# Patient Record
Sex: MALE | Race: WHITE | Employment: OTHER | URBAN - METROPOLITAN AREA
[De-identification: names, ages, dates, MRNs, and addresses within clinical notes are randomized per-mention and may not be internally consistent; named-entity substitution may affect disease eponyms.]

---

## 2017-01-03 RX ORDER — GABAPENTIN 300 MG/1
CAPSULE ORAL
Qty: 270 CAPSULE | Refills: 3 | Status: SHIPPED | OUTPATIENT
Start: 2017-01-03 | End: 2019-05-17

## 2017-03-06 RX ORDER — TAMSULOSIN HYDROCHLORIDE 0.4 MG/1
CAPSULE ORAL
Qty: 90 CAPSULE | Refills: 0 | Status: SHIPPED | OUTPATIENT
Start: 2017-03-06 | End: 2019-05-06 | Stop reason: SDUPTHER

## 2017-03-06 RX ORDER — PRAVASTATIN SODIUM 40 MG
TABLET ORAL
Qty: 90 TABLET | Refills: 0 | Status: SHIPPED | OUTPATIENT
Start: 2017-03-06 | End: 2019-05-06

## 2017-03-06 RX ORDER — FLUOXETINE HYDROCHLORIDE 40 MG/1
CAPSULE ORAL
Qty: 90 CAPSULE | Refills: 0 | Status: SHIPPED | OUTPATIENT
Start: 2017-03-06 | End: 2020-03-10 | Stop reason: SDUPTHER

## 2017-03-18 RX ORDER — SULINDAC 200 MG/1
TABLET ORAL
Qty: 60 TABLET | Refills: 4 | Status: SHIPPED | OUTPATIENT
Start: 2017-03-18 | End: 2019-05-06 | Stop reason: SDUPTHER

## 2017-03-20 RX ORDER — PRAVASTATIN SODIUM 40 MG
TABLET ORAL
Qty: 90 TABLET | Refills: 0 | OUTPATIENT
Start: 2017-03-20

## 2017-04-03 RX ORDER — CYCLOBENZAPRINE HCL 10 MG
TABLET ORAL
Qty: 120 TABLET | Refills: 1 | Status: SHIPPED | OUTPATIENT
Start: 2017-04-03 | End: 2021-12-02 | Stop reason: ALTCHOICE

## 2017-05-01 RX ORDER — ATENOLOL 100 MG/1
TABLET ORAL
Qty: 90 TABLET | Refills: 0 | Status: SHIPPED | OUTPATIENT
Start: 2017-05-01 | End: 2020-09-10 | Stop reason: SDUPTHER

## 2017-05-06 RX ORDER — TERAZOSIN 5 MG/1
CAPSULE ORAL
Qty: 90 CAPSULE | Refills: 0 | Status: SHIPPED | OUTPATIENT
Start: 2017-05-06 | End: 2019-05-06 | Stop reason: ALTCHOICE

## 2017-08-23 RX ORDER — SULINDAC 200 MG/1
TABLET ORAL
Qty: 60 TABLET | Refills: 1 | OUTPATIENT
Start: 2017-08-23

## 2017-08-25 RX ORDER — SULINDAC 200 MG/1
TABLET ORAL
Qty: 60 TABLET | Refills: 1 | OUTPATIENT
Start: 2017-08-25

## 2017-10-17 RX ORDER — CYCLOBENZAPRINE HCL 10 MG
TABLET ORAL
Qty: 120 TABLET | Refills: 0 | OUTPATIENT
Start: 2017-10-17

## 2018-01-03 RX ORDER — LISINOPRIL AND HYDROCHLOROTHIAZIDE 25; 20 MG/1; MG/1
TABLET ORAL
Qty: 90 TABLET | Refills: 0 | OUTPATIENT
Start: 2018-01-03

## 2018-01-04 RX ORDER — LISINOPRIL AND HYDROCHLOROTHIAZIDE 25; 20 MG/1; MG/1
TABLET ORAL
Qty: 90 TABLET | Refills: 0 | OUTPATIENT
Start: 2018-01-04

## 2019-05-06 ENCOUNTER — OFFICE VISIT (OUTPATIENT)
Dept: FAMILY MEDICINE CLINIC | Age: 64
End: 2019-05-06
Payer: MEDICARE

## 2019-05-06 VITALS
TEMPERATURE: 97.8 F | SYSTOLIC BLOOD PRESSURE: 136 MMHG | OXYGEN SATURATION: 96 % | BODY MASS INDEX: 45.1 KG/M2 | HEIGHT: 70 IN | WEIGHT: 315 LBS | HEART RATE: 52 BPM | DIASTOLIC BLOOD PRESSURE: 84 MMHG

## 2019-05-06 DIAGNOSIS — J45.20 MILD INTERMITTENT ASTHMA WITHOUT COMPLICATION: ICD-10-CM

## 2019-05-06 DIAGNOSIS — I10 ESSENTIAL HYPERTENSION: Primary | ICD-10-CM

## 2019-05-06 DIAGNOSIS — G47.33 OSA TREATED WITH BIPAP: ICD-10-CM

## 2019-05-06 DIAGNOSIS — R35.0 BENIGN PROSTATIC HYPERPLASIA WITH URINARY FREQUENCY: ICD-10-CM

## 2019-05-06 DIAGNOSIS — M19.90 ARTHRITIS: ICD-10-CM

## 2019-05-06 DIAGNOSIS — Z91.81 AT HIGH RISK FOR FALLS: ICD-10-CM

## 2019-05-06 DIAGNOSIS — R35.0 FREQUENT URINATION: ICD-10-CM

## 2019-05-06 DIAGNOSIS — E78.00 HYPERCHOLESTEREMIA: ICD-10-CM

## 2019-05-06 DIAGNOSIS — N40.1 BENIGN PROSTATIC HYPERPLASIA WITH URINARY FREQUENCY: ICD-10-CM

## 2019-05-06 LAB
A/G RATIO: 1.4 (ref 1.1–2.2)
ALBUMIN SERPL-MCNC: 4.3 G/DL (ref 3.4–5)
ALP BLD-CCNC: 75 U/L (ref 40–129)
ALT SERPL-CCNC: 18 U/L (ref 10–40)
ANION GAP SERPL CALCULATED.3IONS-SCNC: 13 MMOL/L (ref 3–16)
AST SERPL-CCNC: 16 U/L (ref 15–37)
BASOPHILS ABSOLUTE: 0.1 K/UL (ref 0–0.2)
BASOPHILS RELATIVE PERCENT: 0.8 %
BILIRUB SERPL-MCNC: 0.4 MG/DL (ref 0–1)
BUN BLDV-MCNC: 19 MG/DL (ref 7–20)
CALCIUM SERPL-MCNC: 9.8 MG/DL (ref 8.3–10.6)
CHLORIDE BLD-SCNC: 104 MMOL/L (ref 99–110)
CHOLESTEROL, TOTAL: 193 MG/DL (ref 0–199)
CO2: 25 MMOL/L (ref 21–32)
CREAT SERPL-MCNC: 1.3 MG/DL (ref 0.8–1.3)
EOSINOPHILS ABSOLUTE: 0.3 K/UL (ref 0–0.6)
EOSINOPHILS RELATIVE PERCENT: 4.1 %
GFR AFRICAN AMERICAN: >60
GFR NON-AFRICAN AMERICAN: 55
GLOBULIN: 3 G/DL
GLUCOSE BLD-MCNC: 104 MG/DL (ref 70–99)
HCT VFR BLD CALC: 41.7 % (ref 40.5–52.5)
HDLC SERPL-MCNC: 56 MG/DL (ref 40–60)
HEMOGLOBIN: 14 G/DL (ref 13.5–17.5)
LDL CHOLESTEROL CALCULATED: 116 MG/DL
LYMPHOCYTES ABSOLUTE: 1.4 K/UL (ref 1–5.1)
LYMPHOCYTES RELATIVE PERCENT: 21.9 %
MCH RBC QN AUTO: 29.4 PG (ref 26–34)
MCHC RBC AUTO-ENTMCNC: 33.5 G/DL (ref 31–36)
MCV RBC AUTO: 87.8 FL (ref 80–100)
MONOCYTES ABSOLUTE: 0.6 K/UL (ref 0–1.3)
MONOCYTES RELATIVE PERCENT: 9.4 %
NEUTROPHILS ABSOLUTE: 4.1 K/UL (ref 1.7–7.7)
NEUTROPHILS RELATIVE PERCENT: 63.8 %
PDW BLD-RTO: 14.8 % (ref 12.4–15.4)
PLATELET # BLD: 192 K/UL (ref 135–450)
PMV BLD AUTO: 9.7 FL (ref 5–10.5)
POTASSIUM SERPL-SCNC: 4.2 MMOL/L (ref 3.5–5.1)
RBC # BLD: 4.75 M/UL (ref 4.2–5.9)
SODIUM BLD-SCNC: 142 MMOL/L (ref 136–145)
TOTAL PROTEIN: 7.3 G/DL (ref 6.4–8.2)
TRIGL SERPL-MCNC: 106 MG/DL (ref 0–150)
VLDLC SERPL CALC-MCNC: 21 MG/DL
WBC # BLD: 6.4 K/UL (ref 4–11)

## 2019-05-06 PROCEDURE — G8427 DOCREV CUR MEDS BY ELIG CLIN: HCPCS | Performed by: NURSE PRACTITIONER

## 2019-05-06 PROCEDURE — 3017F COLORECTAL CA SCREEN DOC REV: CPT | Performed by: NURSE PRACTITIONER

## 2019-05-06 PROCEDURE — G8417 CALC BMI ABV UP PARAM F/U: HCPCS | Performed by: NURSE PRACTITIONER

## 2019-05-06 PROCEDURE — 4040F PNEUMOC VAC/ADMIN/RCVD: CPT | Performed by: NURSE PRACTITIONER

## 2019-05-06 PROCEDURE — 36415 COLL VENOUS BLD VENIPUNCTURE: CPT | Performed by: NURSE PRACTITIONER

## 2019-05-06 PROCEDURE — 99215 OFFICE O/P EST HI 40 MIN: CPT | Performed by: NURSE PRACTITIONER

## 2019-05-06 PROCEDURE — 1036F TOBACCO NON-USER: CPT | Performed by: NURSE PRACTITIONER

## 2019-05-06 PROCEDURE — 1123F ACP DISCUSS/DSCN MKR DOCD: CPT | Performed by: NURSE PRACTITIONER

## 2019-05-06 RX ORDER — TAMSULOSIN HYDROCHLORIDE 0.4 MG/1
CAPSULE ORAL
Qty: 90 CAPSULE | Refills: 0 | Status: SHIPPED | OUTPATIENT
Start: 2019-05-06 | End: 2021-01-22 | Stop reason: SDUPTHER

## 2019-05-06 RX ORDER — SULINDAC 200 MG/1
200 TABLET ORAL 2 TIMES DAILY
Qty: 180 TABLET | Refills: 0 | Status: CANCELLED | OUTPATIENT
Start: 2019-05-06

## 2019-05-06 RX ORDER — SULINDAC 200 MG/1
TABLET ORAL
Qty: 60 TABLET | Refills: 4 | Status: SHIPPED | OUTPATIENT
Start: 2019-05-06 | End: 2022-04-01 | Stop reason: SDUPTHER

## 2019-05-06 ASSESSMENT — ENCOUNTER SYMPTOMS
PHOTOPHOBIA: 0
COUGH: 0
NAUSEA: 0
ABDOMINAL PAIN: 0
RHINORRHEA: 0
TROUBLE SWALLOWING: 0
WHEEZING: 0
DIARRHEA: 0
SORE THROAT: 0

## 2019-05-06 ASSESSMENT — PATIENT HEALTH QUESTIONNAIRE - PHQ9
SUM OF ALL RESPONSES TO PHQ9 QUESTIONS 1 & 2: 0
SUM OF ALL RESPONSES TO PHQ QUESTIONS 1-9: 0
2. FEELING DOWN, DEPRESSED OR HOPELESS: 0
SUM OF ALL RESPONSES TO PHQ QUESTIONS 1-9: 0
1. LITTLE INTEREST OR PLEASURE IN DOING THINGS: 0

## 2019-05-06 NOTE — PATIENT INSTRUCTIONS
Please read the healthy family handout that you were given and share it with your family. Please compare this printed medication list with your medications at home to be sure they are the same. If you have any medications that are different please contact us immediately at [phone]. Also review your allergies that we have listed, these may also include medications that you have not been able to tolerate, make sure everything listed is correct. If you have any allergies that are different please contact us immediately at [phone].

## 2019-05-06 NOTE — PROGRESS NOTES
Patient: Sonal Montilla is a 75 y.o. male who presents today with the following Chief Complaint(s):  Chief Complaint   Patient presents with   [address]:  1. Essential hypertension  Controlled  Continue current medication as taking at home. If dizziness returns or bradycardia worsens discontinue metoprolol  Follow-up in 4 months  - CBC Auto Differential  - Comprehensive Metabolic Panel    2. Hypercholesteremia  Pending labs  Continue current medication  Follow-up and 12 months  - Lipid Panel    3. Arthritis  Uncontrolled  Take Neurontin as directed. Discussed Clinoril should not be taken after the age of 72. Clinoril decrease his kidney functions and may cause blood pressure to go higher. Also has cardiovascular risk after age 72. Verbal understands. Use sparingly  Follow-up in 4 months  - sulindac (CLINORIL) 200 MG tablet; TAKE ONE TABLET BY MOUTH TWICE A DAY  Dispense: 60 tablet; Refill: 4    4. Benign prostatic hyperplasia with urinary frequency  Symptomatic  Pending PSA  Continue current medication  Declines further evaluation    5. NICOLE treated with BiPAP  Continue current treatment    6. Frequent urination  Likely related to BPH  - tamsulosin (FLOMAX) 0.4 MG capsule; TAKE ONE CAPSULE BY MOUTH DAILY  Dispense: 90 capsule; Refill: 0  - PSA, Total and Free    7. Mild intermittent asthma without complication  Uncontrolled  Refuses inhalers or other evaluation/treatment    8. At high risk for falls  Symptoms have pretty improved significantly since decreasing atenolol 50 mg daily. Declines further evaluation  Follow-up in 1 year sooner as needed. MARY Quijano is in the office as a returning patient. He was living in [place] with his wife and recently moved back to [place] to be home with his ill stepdaughter. He was seen a primary care provider in [place]. Last visit was 2 months ago. At that time he was having problems with stumbling and dizziness.  Terazosin was discontinued and metoprolol dose was cut in half. He reports his symptoms have improved significantly. He reports that he has hammertoes on both feet and severe arthritis in his left ankle which also makes him stumble. .  Reports he has not actually fallen. Reports his blood pressure has been stable. Denies chest pain, palpitations, orthopnea, abnormal fatigue or headache. He does have some shortness of breath with exertion but relates that to being obese. He has high cholesterol which she takes medication for. He is noncompliant with diet and exercise. He has tried a diet in the past but does not stick with it. Does not exercise because of multiple joint arthritis    BPH is treated with Flomax. He reports he still urinates quite frequently. He has urinary urgency and occasional functional incontinence. Denies hematuria    He has history of asthma. He sometimes has problems getting his breath. Does not use an inhaler. He does not feel that he needs an inhaler. He does have sleep apnea and uses a machine every night. Current Outpatient Medications   Medication Sig Dispense Refill    atenolol (TENORMIN) 100 MG tablet TAKE ONE TABLET BY MOUTH DAILY (Patient taking differently: Taking 1/2 tablet daily) 90 tablet 0    sulindac (CLINORIL) 200 MG tablet TAKE ONE TABLET BY MOUTH TWICE A DAY 60 tablet 4    tamsulosin (FLOMAX) 0.4 MG capsule TAKE ONE CAPSULE BY MOUTH DAILY 90 capsule 0    FLUoxetine (PROZAC) 40 MG capsule TAKE ONE CAPSULE BY MOUTH DAILY 90 capsule 0    gabapentin (NEURONTIN) 300 MG capsule TAKE ONE CAPSULE BY MOUTH THREE TIMES A  capsule 3    lisinopril-hydrochlorothiazide (PRINZIDE;ZESTORETIC) 20-25 MG per tablet Take 1 tablet by mouth daily 90 tablet 3    Multiple Vitamin (THERA) TABS Take 1 tablet by mouth daily.  SAW PALMETTO Take  by mouth daily.       cyclobenzaprine (FLEXERIL) 10 MG tablet TAKE ONE TABLET BY MOUTH FOUR TIMES A DAY AS NEEDED FOR MUSCLE SPASMS 120 tablet 1  Cholecalciferol (VITAMIN D) 2000 UNITS CAPS capsule 1 tablet daily 30 capsule      No current facility-administered medications for this visit. Patient's past medical history, surgical history, family history, medications,  and allergies  were all reviewed and updated as appropriate today. Review of Systems   Constitutional: Negative for appetite change, fatigue and unexpected weight change. HENT: Negative for congestion, ear pain, rhinorrhea, sore throat and trouble swallowing. Eyes: Negative for photophobia and visual disturbance. Respiratory: Negative for cough and wheezing. Cardiovascular: Negative for chest pain and palpitations. Gastrointestinal: Negative for abdominal pain, diarrhea and nausea. Endocrine: Positive for polyphagia and polyuria. Negative for polydipsia. Skin: Negative for rash. Allergic/Immunologic: Positive for environmental allergies. Negative for immunocompromised state. Neurological: Negative for numbness and headaches. Hematological: Does not bruise/bleed easily. Psychiatric/Behavioral: The patient is not nervous/anxious. See HPI    Physical Exam   Constitutional: He is oriented to person, place, and time. He appears well-developed and well-nourished. HENT:   Head: Normocephalic and atraumatic. Mouth/Throat: Oropharynx is clear and moist.   Eyes: Pupils are equal, round, and reactive to light. Conjunctivae and EOM are normal.   Neck: Normal range of motion. Neck supple. Carotid bruit is not present. Normal range of motion present. Cardiovascular: Regular rhythm, normal heart sounds and intact distal pulses. Bradycardia present. No murmur heard. Pulmonary/Chest: Effort normal and breath sounds normal. No respiratory distress. Musculoskeletal: He exhibits no edema. Neurological: He is alert and oriented to person, place, and time. Skin: Skin is warm and dry. Capillary refill takes 2 to 3 seconds.    Psychiatric: He has a normal mood and affect. Judgment and thought content normal.   Vitals reviewed. Vitals:    05/06/19 0845   BP: 136/84   Pulse: 52   Temp: 97.8 °F (36.6 °C)   TempSrc: Oral   SpO2: 96%   Weight: (!) 317 lb 2 oz (143.8 kg)   Height: 5' 10\" (1.778 m)           JASEN Rosado-CNP    The note was completedusing Dragon voice recognition transcription. Every effort was made to ensure accuracy; however, inadvertent  transcription errors may be present despite my best efforts to edit errors. On the basis of positive falls risk screening, assessment and plan is as follows: home safety tips provided, patient will follow up in 1 year(s) for further evaluation, patient declines any further evaluation/treatment for increased falls risk.

## 2019-05-08 LAB
PROSTATE SPECIFIC ANTIGEN FREE: 0.6 UG/L
PROSTATE SPECIFIC ANTIGEN PERCENT FREE: 40 %
PROSTATE SPECIFIC ANTIGEN: 1.5 UG/L (ref 0–4)

## 2019-05-08 RX ORDER — ATORVASTATIN CALCIUM 20 MG/1
20 TABLET, FILM COATED ORAL DAILY
Qty: 30 TABLET | Refills: 3 | Status: SHIPPED | OUTPATIENT
Start: 2019-05-08 | End: 2022-02-24 | Stop reason: SDUPTHER

## 2019-05-17 ENCOUNTER — OFFICE VISIT (OUTPATIENT)
Dept: FAMILY MEDICINE CLINIC | Age: 64
End: 2019-05-17
Payer: MEDICARE

## 2019-05-17 VITALS
RESPIRATION RATE: 16 BRPM | OXYGEN SATURATION: 94 % | HEART RATE: 61 BPM | DIASTOLIC BLOOD PRESSURE: 85 MMHG | SYSTOLIC BLOOD PRESSURE: 129 MMHG | HEIGHT: 70 IN | BODY MASS INDEX: 44.95 KG/M2 | WEIGHT: 314 LBS

## 2019-05-17 DIAGNOSIS — L03.115 CELLULITIS OF RIGHT LOWER LEG: Primary | ICD-10-CM

## 2019-05-17 DIAGNOSIS — S80.11XA CONTUSION OF RIGHT LOWER LEG, INITIAL ENCOUNTER: ICD-10-CM

## 2019-05-17 DIAGNOSIS — G57.93 NEUROPATHY INVOLVING BOTH LOWER EXTREMITIES: ICD-10-CM

## 2019-05-17 PROCEDURE — 4040F PNEUMOC VAC/ADMIN/RCVD: CPT | Performed by: NURSE PRACTITIONER

## 2019-05-17 PROCEDURE — 3017F COLORECTAL CA SCREEN DOC REV: CPT | Performed by: NURSE PRACTITIONER

## 2019-05-17 PROCEDURE — 1123F ACP DISCUSS/DSCN MKR DOCD: CPT | Performed by: NURSE PRACTITIONER

## 2019-05-17 PROCEDURE — 1036F TOBACCO NON-USER: CPT | Performed by: NURSE PRACTITIONER

## 2019-05-17 PROCEDURE — 99214 OFFICE O/P EST MOD 30 MIN: CPT | Performed by: NURSE PRACTITIONER

## 2019-05-17 PROCEDURE — G8427 DOCREV CUR MEDS BY ELIG CLIN: HCPCS | Performed by: NURSE PRACTITIONER

## 2019-05-17 PROCEDURE — G8417 CALC BMI ABV UP PARAM F/U: HCPCS | Performed by: NURSE PRACTITIONER

## 2019-05-17 RX ORDER — CEPHALEXIN 500 MG/1
500 CAPSULE ORAL 4 TIMES DAILY
Qty: 28 CAPSULE | Refills: 0 | Status: SHIPPED | OUTPATIENT
Start: 2019-05-17 | End: 2019-05-24

## 2019-05-17 RX ORDER — GABAPENTIN 400 MG/1
400 CAPSULE ORAL 3 TIMES DAILY
Qty: 270 CAPSULE | Refills: 0 | Status: SHIPPED | OUTPATIENT
Start: 2019-05-17 | End: 2019-08-12 | Stop reason: SDUPTHER

## 2019-05-17 NOTE — PROGRESS NOTES
Patient: Miguel Le is a 75 y.o. male who presents today with the following Chief Complaint(s):  Chief Complaint   Patient presents with    Fall     onset x 1 week ago, pt hit right lower leg on a \"crossbeam\", spouse applied her leftover eczema cream, no relief . Pt states he has had past cellulitis. Helps to elevate     Leg Injury     right lower leg, red, warm to touch, swollen, knot formed, tenderness is constant and rated 5/10 on scale. Assessment & Plan:  1. Cellulitis of right lower leg  New problem  Warm compresses, Ace wrap's and elevation  Avoid fresh water sources such as [place], ponds and [place] antibiotic  Follow-up if not improved in one week  - cephALEXin (KEFLEX) 500 MG capsule; Take 1 capsule by mouth 4 times daily for 7 days  Dispense: 28 capsule; Refill: 0    2. Contusion of right lower leg, initial encounter  Recommended imaging. Patient declines at this time. He may call back if symptoms are not improved in one week for imaging order. No open wounds  Gentle massage to hematoma  Follow-up if not improved in one week    3. Neuropathy involving both lower extremities  Uncontrolled  Increase gabapentin  - gabapentin (NEURONTIN) 400 MG capsule; Take 1 capsule by mouth 3 times daily for 90 days. Dispense: 270 capsule; Refill: 0      HPI  Bill's in the office with complaints of right lower leg injury. One week ago he was helping someone move a dock and he fell hitting his leg on board on the dock. No other injuries. Immediately the leg got red and sore. He has a hematoma to the shin. Redness and pain has increased over the past week. Redness has started to spread to his ankle. He has some swelling and warmth. Wife has been using psoriasis cream without relief. Symptoms are better with elevation. It is painful to walk and touch. Denies open wound, drainage, fever, nausea or worsening of numbness. Site noted patient has bilateral lower leg neuropathy.   He feels oriented to person, place, and time. Skin: Skin is warm and dry. Psychiatric: He has a normal mood and affect. His behavior is normal. Thought content normal.   Nursing note and vitals reviewed. Vitals:    05/17/19 1015   BP: 129/85   Site: Left Upper Arm   Position: Sitting   Cuff Size: Large Adult   Pulse: 61   Resp: 16   SpO2: 94%   Weight: (!) 314 lb (142.4 kg)   Height: 5' 10\" (1.778 m)           Lissette Butler, APRN-CNP    The note was completedusing Dragon voice recognition transcription. Every effort was made to ensure accuracy; however, inadvertent  transcription errors may be present despite my best efforts to edit errors.

## 2019-05-17 NOTE — PATIENT INSTRUCTIONS
Patient Education        Cellulitis: Care Instructions  Your Care Instructions    Cellulitis is a skin infection caused by bacteria, most often strep or staph. It often occurs after a break in the skin from a scrape, cut, bite, or puncture, or after a rash. Cellulitis may be treated without doing tests to find out what caused it. But your doctor may do tests, if needed, to look for a specific bacteria, like methicillin-resistant Staphylococcus aureus (MRSA). The doctor has checked you carefully, but problems can develop later. If you notice any problems or new symptoms, get medical treatment right away. Follow-up care is a key part of your treatment and safety. Be sure to make and go to all appointments, and call your doctor if you are having problems. It's also a good idea to know your test results and keep a list of the medicines you take. How can you care for yourself at home? · Take your antibiotics as directed. Do not stop taking them just because you feel better. You need to take the full course of antibiotics. · Prop up the infected area on pillows to reduce pain and swelling. Try to keep the area above the level of your heart as often as you can. · If your doctor told you how to care for your wound, follow your doctor's instructions. If you did not get instructions, follow this general advice:  ? Wash the wound with clean water 2 times a day. Don't use hydrogen peroxide or alcohol, which can slow healing. ? You may cover the wound with a thin layer of petroleum jelly, such as Vaseline, and a nonstick bandage. ? Apply more petroleum jelly and replace the bandage as needed. · Be safe with medicines. Take pain medicines exactly as directed. ? If the doctor gave you a prescription medicine for pain, take it as prescribed. ? If you are not taking a prescription pain medicine, ask your doctor if you can take an over-the-counter medicine.   To prevent cellulitis in the future  · Try to prevent cuts, scrapes, or other injuries to your skin. Cellulitis most often occurs where there is a break in the skin. · If you get a scrape, cut, mild burn, or bite, wash the wound with clean water as soon as you can to help avoid infection. Don't use hydrogen peroxide or alcohol, which can slow healing. · If you have swelling in your legs (edema), support stockings and good skin care may help prevent leg sores and cellulitis. · Take care of your feet, especially if you have diabetes or other conditions that increase the risk of infection. Wear shoes and socks. Do not go barefoot. If you have athlete's foot or other skin problems on your feet, talk to your doctor about how to treat them. When should you call for help? Call your doctor now or seek immediate medical care if:    · You have signs that your infection is getting worse, such as:  ? Increased pain, swelling, warmth, or redness. ? Red streaks leading from the area. ? Pus draining from the area. ? A fever.     · You get a rash.    Watch closely for changes in your health, and be sure to contact your doctor if:    · You do not get better as expected. Where can you learn more? Go to https://Sunglass.Oculus360. org and sign in to your Publer account. Enter G053 in the [facility] box to learn more about \"Cellulitis: Care Instructions. \"     If you do not have an account, please click on the \"Sign Up Now\" link. Current as of: April 17, 2018  Content Version: 12.0  © 9435-4823 Healthwise, Incorporated. Care instructions adapted under license by [facility] ([facility]). If you have questions about a medical condition or this instruction, always ask your healthcare professional. [place] [address] any warranty or liability for your use of this information. Patient Education        Contusion: Care Instructions  Your Care Instructions    Contusion is the medical term for a bruise.  It is the result of a direct blow or an impact, such   · The area near the contusion is cold or pale.    Watch closely for changes in your health, and be sure to contact your doctor if:    · You do not get better as expected. Where can you learn more? Go to https://[employer] Technologypecathyeweb.[employer]. org and sign in to your [employer] account. Enter V188 in the indoo.rs box to learn more about \"Contusion: Care Instructions. \"     If you do not have an account, please click on the \"Sign Up Now\" link. Current as of: September 23, 2018  Content Version: 12.0  © 7476-4276 Healthwise, Incorporated. Care instructions adapted under license by [facility] ([facility]). If you have questions about a medical condition or this instruction, always ask your healthcare professional. [address] any warranty or liability for your use of this information.

## 2020-03-10 ENCOUNTER — OFFICE VISIT (OUTPATIENT)
Dept: FAMILY MEDICINE CLINIC | Age: 65
End: 2020-03-10
Payer: MEDICARE

## 2020-03-10 VITALS
OXYGEN SATURATION: 96 % | TEMPERATURE: 97.9 F | BODY MASS INDEX: 46.67 KG/M2 | DIASTOLIC BLOOD PRESSURE: 92 MMHG | SYSTOLIC BLOOD PRESSURE: 148 MMHG | HEART RATE: 54 BPM | WEIGHT: 315 LBS

## 2020-03-10 LAB
ANION GAP SERPL CALCULATED.3IONS-SCNC: 14 MMOL/L (ref 3–16)
BUN BLDV-MCNC: 32 MG/DL (ref 7–20)
CALCIUM SERPL-MCNC: 9.7 MG/DL (ref 8.3–10.6)
CHLORIDE BLD-SCNC: 110 MMOL/L (ref 99–110)
CHOLESTEROL, FASTING: 107 MG/DL (ref 0–199)
CO2: 22 MMOL/L (ref 21–32)
CREAT SERPL-MCNC: 1 MG/DL (ref 0.8–1.3)
GFR AFRICAN AMERICAN: >60
GFR NON-AFRICAN AMERICAN: >60
GLUCOSE BLD-MCNC: 89 MG/DL (ref 70–99)
HDLC SERPL-MCNC: 45 MG/DL (ref 40–60)
LDL CHOLESTEROL CALCULATED: 47 MG/DL
POTASSIUM SERPL-SCNC: 4.6 MMOL/L (ref 3.5–5.1)
SODIUM BLD-SCNC: 146 MMOL/L (ref 136–145)
TRIGLYCERIDE, FASTING: 75 MG/DL (ref 0–150)
VLDLC SERPL CALC-MCNC: 15 MG/DL

## 2020-03-10 PROCEDURE — 99214 OFFICE O/P EST MOD 30 MIN: CPT | Performed by: NURSE PRACTITIONER

## 2020-03-10 PROCEDURE — 90732 PPSV23 VACC 2 YRS+ SUBQ/IM: CPT | Performed by: NURSE PRACTITIONER

## 2020-03-10 PROCEDURE — 36415 COLL VENOUS BLD VENIPUNCTURE: CPT | Performed by: NURSE PRACTITIONER

## 2020-03-10 PROCEDURE — G0009 ADMIN PNEUMOCOCCAL VACCINE: HCPCS | Performed by: NURSE PRACTITIONER

## 2020-03-10 RX ORDER — TOPIRAMATE 50 MG/1
TABLET, FILM COATED ORAL
COMMUNITY
Start: 2020-01-20 | End: 2020-03-10 | Stop reason: SINTOL

## 2020-03-10 RX ORDER — FLUOXETINE HYDROCHLORIDE 40 MG/1
40 CAPSULE ORAL DAILY
Qty: 90 CAPSULE | Refills: 0 | Status: SHIPPED | OUTPATIENT
Start: 2020-03-10 | End: 2020-06-10 | Stop reason: SDUPTHER

## 2020-03-10 RX ORDER — GABAPENTIN 300 MG/1
600 CAPSULE ORAL 3 TIMES DAILY
Qty: 180 CAPSULE | Refills: 1 | Status: SHIPPED | OUTPATIENT
Start: 2020-03-10 | End: 2020-05-12 | Stop reason: SDUPTHER

## 2020-03-10 RX ORDER — ZOSTER VACCINE RECOMBINANT, ADJUVANTED 50 MCG/0.5
0.5 KIT INTRAMUSCULAR SEE ADMIN INSTRUCTIONS
Qty: 0.5 ML | Refills: 0 | Status: SHIPPED | OUTPATIENT
Start: 2020-03-10 | End: 2020-09-06

## 2020-03-10 ASSESSMENT — ENCOUNTER SYMPTOMS
ABDOMINAL PAIN: 0
RHINORRHEA: 0
WHEEZING: 0
NAUSEA: 0
CHEST TIGHTNESS: 0
BACK PAIN: 1
SHORTNESS OF BREATH: 0
ABDOMINAL DISTENTION: 0
SORE THROAT: 0
DIARRHEA: 0
COUGH: 0
VOMITING: 0

## 2020-03-10 ASSESSMENT — PATIENT HEALTH QUESTIONNAIRE - PHQ9
SUM OF ALL RESPONSES TO PHQ QUESTIONS 1-9: 0
SUM OF ALL RESPONSES TO PHQ QUESTIONS 1-9: 0
SUM OF ALL RESPONSES TO PHQ9 QUESTIONS 1 & 2: 0
2. FEELING DOWN, DEPRESSED OR HOPELESS: 0
1. LITTLE INTEREST OR PLEASURE IN DOING THINGS: 0

## 2020-03-10 NOTE — PATIENT INSTRUCTIONS
Please read the healthy family handout that you were given and share it with your family. Please compare this printed medication list with your medications at home to be sure they are the same. If you have any medications that are different please contact us immediately at [phone]. Also review your allergies that we have listed, these may also include medications that you have not been able to tolerate, make sure everything listed is correct. If you have any allergies that are different please contact us immediately at [phone].

## 2020-03-10 NOTE — PROGRESS NOTES
CHOLECYSTECTOMY      COLONOSCOPY  11    KNEE SURGERY Right 2016    Rt total knee    OTHER SURGICAL HISTORY Right 2016    RIGHT TOTAL SHOULDER REPLACEMENT          SHOULDER ARTHROPLASTY  2012    Lt total shoulder arthroplasty    TOTAL KNEE ARTHROPLASTY Left 2016     Family History   Problem Relation Age of Onset    Cancer Mother         breast    High Blood Pressure Mother     Arthritis Father     Depression Father     Hearing Loss Father     Heart Disease Father     Stroke Father     Vision Loss Father      Social History     Socioeconomic History    Marital status:      Spouse name: Not on file    Number of children: Not on file    Years of education: Not on file    Highest education level: Not on file   Occupational History    Not on file   Social Needs    Financial resource strain: Not on file    Food insecurity     Worry: Not on file     Inability: Not on file    Transportation needs     Medical: Not on file     Non-medical: Not on file   Tobacco Use    Smoking status: Former Smoker     Packs/day: 3.00     Years: 32.00     Pack years: 96.00     Last attempt to quit: 1999     Years since quittin.1    Smokeless tobacco: Never Used   Substance and Sexual Activity    Alcohol use: No    Drug use: No    Sexual activity: Not on file   Lifestyle    Physical activity     Days per week: Not on file     Minutes per session: Not on file    Stress: Not on file   Relationships    Social connections     Talks on phone: Not on file     Gets together: Not on file     Attends Oriental orthodox service: Not on file     Active member of club or organization: Not on file     Attends meetings of clubs or organizations: Not on file     Relationship status: Not on file    Intimate partner violence     Fear of current or ex partner: Not on file     Emotionally abused: Not on file     Physically abused: Not on file     Forced sexual activity: Not on file   Other Topics range of motion. General: Swelling present. No deformity. Feet:      Comments: Pain upon palpation of bilateral heels. No open wounds or lesions. Skin:     General: Skin is warm and dry. Capillary Refill: Capillary refill takes less than 2 seconds. Coloration: Skin is not pale. Findings: No bruising, lesion or rash. Neurological:      General: No focal deficit present. Mental Status: He is alert and oriented to person, place, and time. Psychiatric:         Mood and Affect: Mood normal.         Behavior: Behavior normal.         ASSESSMENT/PLAN:   1. Essential hypertension  Hypertension controlled patient reports that he is compliant with his atenolol. Patient was instructed on weight gain from previous records. Patient reports that he was recently started on Topamax for weight loss but was unsure if he should be taking it. Patient reports he has stopped drinking pop but otherwise no dietary changes. I did speak with-diet. Patient compliant with medications and denies any accompanying symptoms at this time.  - BASIC METABOLIC PANEL    2. Hypercholesteremia  Previous lipid level 5/2019 revealed slight elevation of LDLs at 116 otherwise no acute abnormalities. Repeat lipids performed today and pending. Patient continued to be instructed on diet control and exercise. Patient compliant on atorvastatin denying any side effects.  - Lipid, Fasting    3. Benign non-nodular prostatic hyperplasia with lower urinary tract symptoms  Patient was placed on Flomax at previous visit and reports that he has been doing well. Patient denies any urinary urgency, frequency, retention or difficulty with starting and stopping stream.  Patient will be continued on Flomax. PSA at normal limits 5/2019. Patient asymptomatic at this time we will continue to monitor. 4. Arthritis  Patient reports bilateral foot pain worsening over the past few months.   Patient reports that he was given a referral while in [place] but did not like that physician. Patient will be given a referral to podiatry and will follow-up as recommended. Patient reports that it is been difficult to ambulate because of the foot pain therefore handicap placard was administered at visit in conjunction with chronic arthritis. Patient has been taking Clinoril for arthritis with no side effects and reports feeling well. Patient is on Neurontin 600 mg 3 times a day. Patient reports that he has not had it for approximately 2 weeks but was feeling well while taking it. I did discuss patient seeing pain management if chronic pain were to continue without achieving cessation of pain while taking Neurontin and seeing podiatry. - [facility], DPM, Podiatry, [facility]  - Handicap Placard MISC; by Does not apply route lifetime  Dispense: 1 each; Refill: 0    5. NIOCLE treated with BiPAP  Continue with BiPAP current treatment. Patient reports occasional nosebleeds when use of BiPAP machine. However patient reports that he cannot use the humidifier because it makes him more congested. I did speak with patient regarding replacing moisture back and nose with Aquaphor ointment. 6. Mild intermittent asthma without complication  Patient reports that it is controlled with no recent flareups or need for steroid therapy. Patient denies any use of inhalers or further evaluation at this time. 7. Need for shingles vaccine  Preventative immunizations updated  - zoster recombinant adjuvanted vaccine [facility]) 50 MCG/0.5ML SUSR injection; Inject 0.5 mLs into the muscle See Admin Instructions 1 dose now and repeat in 2-6 months  Dispense: 0.5 mL; Refill: 0    8. Need for pneumococcal vaccine  Preventative immunizations updated. - PNEUMOVAX 23 subcutaneous/IM (Pneumococcal polysaccharide vaccine 23-valent >= 1yo)    9. Neuropathy involving both lower extremities  Chronic neuropathy controlled with gabapentin.   Patient denies any side effects

## 2020-05-12 RX ORDER — GABAPENTIN 300 MG/1
600 CAPSULE ORAL 3 TIMES DAILY
Qty: 180 CAPSULE | Refills: 1 | Status: SHIPPED | OUTPATIENT
Start: 2020-05-12 | End: 2020-06-10 | Stop reason: SDUPTHER

## 2020-06-10 RX ORDER — FLUOXETINE HYDROCHLORIDE 40 MG/1
40 CAPSULE ORAL DAILY
Qty: 90 CAPSULE | Refills: 0 | Status: SHIPPED | OUTPATIENT
Start: 2020-06-10 | End: 2020-09-10 | Stop reason: SDUPTHER

## 2020-06-10 RX ORDER — GABAPENTIN 300 MG/1
600 CAPSULE ORAL 3 TIMES DAILY
Qty: 540 CAPSULE | Refills: 0 | Status: SHIPPED | OUTPATIENT
Start: 2020-06-10 | End: 2020-09-10 | Stop reason: SDUPTHER

## 2020-06-10 NOTE — TELEPHONE ENCOUNTER
Patient would like to get 90 day on Gabapentin instead of 30   Date of last refill of this med was 5/12, # of pills given 180 and # of refills given 1. Their next appointment is 9/10, the last date patient was seen was 3/10. Does patient have medication agreement on file? Yes  Has drug screen been done in last 12 months if needed?  yes

## 2020-09-10 ENCOUNTER — OFFICE VISIT (OUTPATIENT)
Dept: FAMILY MEDICINE CLINIC | Age: 65
End: 2020-09-10
Payer: MEDICARE

## 2020-09-10 VITALS
DIASTOLIC BLOOD PRESSURE: 101 MMHG | WEIGHT: 315 LBS | BODY MASS INDEX: 48.82 KG/M2 | TEMPERATURE: 98.2 F | OXYGEN SATURATION: 94 % | HEART RATE: 49 BPM | SYSTOLIC BLOOD PRESSURE: 155 MMHG

## 2020-09-10 PROBLEM — Z12.11 ENCOUNTER FOR SCREENING COLONOSCOPY: Status: ACTIVE | Noted: 2020-09-10

## 2020-09-10 PROCEDURE — 99215 OFFICE O/P EST HI 40 MIN: CPT | Performed by: NURSE PRACTITIONER

## 2020-09-10 RX ORDER — FLUOXETINE HYDROCHLORIDE 40 MG/1
40 CAPSULE ORAL DAILY
Qty: 90 CAPSULE | Refills: 0 | Status: SHIPPED | OUTPATIENT
Start: 2020-09-10 | End: 2020-12-03

## 2020-09-10 RX ORDER — ATENOLOL 100 MG/1
TABLET ORAL
Qty: 90 TABLET | Refills: 0 | Status: SHIPPED | OUTPATIENT
Start: 2020-09-10 | End: 2020-12-03

## 2020-09-10 RX ORDER — GABAPENTIN 300 MG/1
600 CAPSULE ORAL 3 TIMES DAILY
Qty: 540 CAPSULE | Refills: 0 | Status: SHIPPED | OUTPATIENT
Start: 2020-09-10 | End: 2020-12-03

## 2020-09-10 ASSESSMENT — ENCOUNTER SYMPTOMS
BACK PAIN: 1
SORE THROAT: 0
WHEEZING: 0
ABDOMINAL DISTENTION: 0
ABDOMINAL PAIN: 0
SHORTNESS OF BREATH: 0
NAUSEA: 0
CHEST TIGHTNESS: 0
RHINORRHEA: 0
VOMITING: 0
COUGH: 0
DIARRHEA: 0

## 2020-09-10 NOTE — PATIENT INSTRUCTIONS
Please read the healthy family handout that you were given and share it with your family. Please compare this printed medication list with your medications at home to be sure they are the same. If you have any medications that are different please contact us immediately at [phone]. Also review your allergies that we have listed, these may also include medications that you have not been able to tolerate, make sure everything listed is correct. If you have any allergies that are different please contact us immediately at [phone]. Patient Education        Anxiety Disorder: Care Instructions  Your Care Instructions     Anxiety is a normal reaction to stress. Difficult situations can cause you to have symptoms such as sweaty palms and a nervous feeling. In an anxiety disorder, the symptoms are far more severe. Constant worry, muscle tension, trouble sleeping, nausea and diarrhea, and other symptoms can make normal daily activities difficult or impossible. These symptoms may occur for no reason, and they can affect your work, school, or social life. Medicines, counseling, and self-care can all help. Follow-up care is a key part of your treatment and safety. Be sure to make and go to all appointments, and call your doctor if you are having problems. It's also a good idea to know your test results and keep a list of the medicines you take. How can you care for yourself at home? · Take medicines exactly as directed. Call your doctor if you think you are having a problem with your medicine. · Go to your counseling sessions and follow-up appointments. · Recognize and accept your anxiety. Then, when you are in a situation that makes you anxious, say to yourself, \"This is not an emergency. I feel uncomfortable, but I am not in danger. I can keep going even if I feel anxious. \"  · Be kind to your body:  ? Relieve tension with exercise or a massage. ? Get enough rest.  ?  Avoid alcohol, caffeine, nicotine, Instructions  Your Care Instructions     Benign prostatic hyperplasia, or BPH, is an enlarged prostate gland. The prostate is a small gland that makes some of the fluid in semen. Prostate enlargement happens to almost all men as they age. It is usually not serious. BPH does not cause prostate cancer. As the prostate gets bigger, it may partly block the flow of urine. You may have a hard time getting a urine stream started or completely stopped. You may have a weak urine stream, or you may have to urinate more often than you used to, especially at night. Most men find these problems easy to manage. You do not need treatment unless your symptoms bother you a lot or you have other problems, such as bladder infections or stones. In these cases, medicines may help. Surgery is not needed unless the urine flow is blocked or the symptoms do not get better with medicine. Follow-up care is a key part of your treatment and safety. Be sure to make and go to all appointments, and call your doctor if you are having problems. It's also a good idea to know your test results and keep a list of the medicines you take. How can you care for yourself at home? · Urinate as much as you can, relax for a few moments, and then try to urinate again. · Sit on the toilet to urinate. · Avoid caffeine and alcohol. These drinks will increase how often you need to urinate. · Many over-the-counter cold and allergy medicines can make the symptoms of BPH worse. Avoid antihistamines, decongestants, and allergy pills, if you can. Read the warnings on the package. · If you take any prescription medicines such as muscle relaxants, pain medicines, or medicines for depression or anxiety, ask your doctor or pharmacist if they can cause urination problems. When should you call for help? Call your doctor now or seek immediate medical care if:  · You cannot urinate at all. · You have symptoms of a urinary infection. For example:  ?  You have blood or pus in your urine. ? You have pain in your back just below your rib cage. This is called flank pain. ? You have a fever, chills, or body aches. ? It hurts to urinate. ? You have groin or belly pain. Watch closely for changes in your health, and be sure to contact your doctor if:  · It hurts when you ejaculate. · Your urinary problems get a lot worse or bother you a lot. Where can you learn more? Go to https://Virtual RestaurantspeBoxToneeb.Netcipia. org and sign in to your Clinithink account. Enter T769 in the [employer] box to learn more about \"Benign Prostatic Hyperplasia: Care Instructions. \"     If you do not have an account, please click on the \"Sign Up Now\" link. Current as of: February 11, 2020               Content Version: 12.5  © 6757-2127 [employer]. Care instructions adapted under license by [facility] ([facility]). If you have questions about a medical condition or this instruction, always ask your healthcare professional. [address] any warranty or liability for your use of this information. Patient Education        DASH Diet: Care Instructions  Your Care Instructions     The DASH diet is an eating plan that can help lower your blood pressure. DASH stands for Dietary Approaches to Stop Hypertension. Hypertension is high blood pressure. The DASH diet focuses on eating foods that are high in calcium, potassium, and magnesium. These nutrients can lower blood pressure. The foods that are highest in these nutrients are fruits, vegetables, low-fat dairy products, nuts, seeds, and legumes. But taking calcium, potassium, and magnesium supplements instead of eating foods that are high in those nutrients does not have the same effect. The DASH diet also includes whole grains, fish, and poultry. The DASH diet is one of several lifestyle changes your doctor may recommend to lower your high blood pressure.  Your doctor may also want you to decrease the amount of sodium in your diet. Lowering sodium while following the DASH diet can lower blood pressure even further than just the DASH diet alone. Follow-up care is a key part of your treatment and safety. Be sure to make and go to all appointments, and call your doctor if you are having problems. It's also a good idea to know your test results and keep a list of the medicines you take. How can you care for yourself at home? Following the DASH diet  · Eat 4 to 5 servings of fruit each day. A serving is 1 medium-sized piece of fruit, ½ cup chopped or canned fruit, 1/4 cup dried fruit, or 4 ounces (½ cup) of fruit juice. Choose fruit more often than fruit juice. · Eat 4 to 5 servings of vegetables each day. A serving is 1 cup of lettuce or raw leafy vegetables, ½ cup of chopped or cooked vegetables, or 4 ounces (½ cup) of vegetable juice. Choose vegetables more often than vegetable juice. · Get 2 to 3 servings of low-fat and fat-free dairy each day. A serving is 8 ounces of milk, 1 cup of yogurt, or 1 ½ ounces of cheese. · Eat 6 to 8 servings of grains each day. A serving is 1 slice of bread, 1 ounce of dry cereal, or ½ cup of cooked rice, pasta, or cooked cereal. Try to choose whole-grain products as much as possible. · Limit lean meat, poultry, and fish to 2 servings each day. A serving is 3 ounces, about the size of a deck of cards. · Eat 4 to 5 servings of nuts, seeds, and legumes (cooked dried beans, lentils, and split peas) each week. A serving is 1/3 cup of nuts, 2 tablespoons of seeds, or ½ cup of cooked beans or peas. · Limit fats and oils to 2 to 3 servings each day. A serving is 1 teaspoon of vegetable oil or 2 tablespoons of salad dressing. · Limit sweets and added sugars to 5 servings or less a week. A serving is 1 tablespoon jelly or jam, ½ cup sorbet, or 1 cup of lemonade. · Eat less than 2,300 milligrams (mg) of sodium a day.  If you limit your sodium to 1,500 mg a day, you can lower your blood pressure even more. Tips for success  · Start small. Do not try to make dramatic changes to your diet all at once. You might feel that you are missing out on your favorite foods and then be more likely to not follow the plan. Make small changes, and stick with them. Once those changes become habit, add a few more changes. · Try some of the following:  ? Make it a goal to eat a fruit or vegetable at every meal and at snacks. This will make it easy to get the recommended amount of fruits and vegetables each day. ? Try yogurt topped with fruit and nuts for a snack or healthy dessert. ? Add lettuce, tomato, cucumber, and onion to sandwiches. ? Combine a ready-made pizza crust with low-fat mozzarella cheese and lots of vegetable toppings. Try using tomatoes, squash, spinach, broccoli, carrots, cauliflower, and onions. ? Have a variety of cut-up vegetables with a low-fat dip as an appetizer instead of chips and dip. ? Sprinkle sunflower seeds or chopped almonds over salads. Or try adding chopped walnuts or almonds to cooked vegetables. ? Try some vegetarian meals using beans and peas. Add garbanzo or kidney beans to salads. Make burritos and tacos with mashed purcell beans or black beans. Where can you learn more? Go to https://Playrcartiona.Oscilla Power. org and sign in to your Physiq account. Enter W779 in the [facility] box to learn more about \"DASH Diet: Care Instructions. \"     If you do not have an account, please click on the \"Sign Up Now\" link. Current as of: December 16, 2019               Content Version: 12.5  © 0183-5124 Healthwise, [employer]. Care instructions adapted under license by [facility] ([facility]). If you have questions about a medical condition or this instruction, always ask your healthcare professional. [address] any warranty or liability for your use of this information. Patient Education        Learning About CPAP for Sleep Apnea  What is CPAP? CPAP is a small machine that you use at home every night while you sleep. It increases air pressure in your throat to keep your airway open. When you have sleep apnea, this can help you sleep better so you feel much better. CPAP stands for \"continuous positive airway pressure. \"  The CPAP machine will have one of the following:  · A mask that covers your nose and mouth  · Prongs that fit into your nose  · A mask that covers your nose only, the most common type. This type is called NCPAP. The N stands for \"nasal.\"  Why is it done? CPAP is usually the best treatment for obstructive sleep apnea. It is the first treatment choice and the most widely used. Your doctor may suggest CPAP if you have:  · Moderate to severe sleep apnea. · Sleep apnea and coronary artery disease (CAD). · Sleep apnea and heart failure. How does it help? · CPAP can help you have more normal sleep, so you feel less sleepy and more alert during the daytime. · CPAP may help keep heart failure or other heart problems from getting worse. · CPAP may help lower your blood pressure. · If you use CPAP, your bed partner may also sleep better because you are not snoring or restless. What are the side effects? Some people who use CPAP have:  · A dry or stuffy nose and a sore throat. · Irritated skin on the face. · Sore eyes. · Bloating. If you have any of these problems, work with your doctor to fix them. Here are some things you can try:  · Be sure the mask or nasal prongs fit well. · See if your doctor can adjust the pressure of your CPAP. · If your nose is dry, try a humidifier. · If your nose is runny or stuffy, try decongestant medicine or a steroid nasal spray. Be safe with medicines. Read and follow all instructions on the label. Do not use the medicine longer than the label says. If these things do not help, you might try a different type of machine. Some machines have air pressure that adjusts on its own.  Others have air pressures that are different when you breathe in than when you breathe out. This may reduce discomfort caused by too much pressure in your nose. Where can you learn more? Go to https://chpepiceweb.FullStory. org and sign in to your Scandid account. Enter G144 in the [facility] box to learn more about \"Learning About CPAP for Sleep Apnea. \"     If you do not have an account, please click on the \"Sign Up Now\" link. Current as of: February 24, 2020               Content Version: 12.5  © 8789-5910 [employer]. Care instructions adapted under license by [facility] [address] If you have questions about a medical condition or this instruction, always ask your healthcare professional. [place] [address] any warranty or liability for your use of this information. Patient Education        How to Get Up Safely After a Fall: Care Instructions  Your Care Instructions     If you have injuries, health problems, or other reasons that may make it easy for you to fall at home, it is a good idea to learn how to get up safely after a fall. Learning how to get up correctly can help you avoid making an injury worse. Also, knowing what to do if you cannot get up can help you stay safe until help arrives. Follow-up care is a key part of your treatment and safety. Be sure to make and go to all appointments, and call your doctor if you are having problems. It's also a good idea to know your test results and keep a list of the medicines you take. How can you care for yourself after a fall? If you think you can get up  First lie still for a few minutes and think about how you feel. If your body feels okay and you think you can get up safely, follow the rest of the steps below:  1. Look for a chair or other piece of furniture that is close to you.   2. Roll onto your side and rest. Roll by turning your head in the direction you want to roll, move your shoulder and arm, then hip and leg in the same more about \"How to Get Up Safely After a Fall: Care Instructions. \"     If you do not have an account, please click on the \"Sign Up Now\" link. Current as of: August 7, 2019               Content Version: 12.5  © 1858-0723 Healthwise, [employer]. Care instructions adapted under license by [facility] ([facility]). If you have questions about a medical condition or this instruction, always ask your healthcare professional. [address] any warranty or liability for your use of this information. Patient Education        High Blood Pressure: Care Instructions  Overview     It's normal for blood pressure to go up and down throughout the day. But if it stays up, you have high blood pressure. Another name for high blood pressure is hypertension. Despite what a lot of people think, high blood pressure usually doesn't cause headaches or make you feel dizzy or lightheaded. It usually has no symptoms. But it does increase your risk of stroke, heart attack, and other problems. You and your doctor will talk about your risks of these problems based on your blood pressure. Your doctor will give you a goal for your blood pressure. Your goal will be based on your health and your age. Lifestyle changes, such as eating healthy and being active, are always important to help lower blood pressure. You might also take medicine to reach your blood pressure goal.  Follow-up care is a key part of your treatment and safety. Be sure to make and go to all appointments, and call your doctor if you are having problems. It's also a good idea to know your test results and keep a list of the medicines you take. How can you care for yourself at home? Medical treatment  · If you stop taking your medicine, your blood pressure will go back up. You may take one or more types of medicine to lower your blood pressure. Be safe with medicines. Take your medicine exactly as prescribed.  Call your doctor if you think you are having a problem with your medicine. · Talk to your doctor before you start taking aspirin every day. Aspirin can help certain people lower their risk of a heart attack or stroke. But taking aspirin isn't right for everyone, because it can cause serious bleeding. · See your doctor regularly. You may need to see the doctor more often at first or until your blood pressure comes down. · If you are taking blood pressure medicine, talk to your doctor before you take decongestants or anti-inflammatory medicine, such as ibuprofen. Some of these medicines can raise blood pressure. · Learn how to check your blood pressure at home. Lifestyle changes  · Stay at a healthy weight. This is especially important if you put on weight around the waist. Losing even 10 pounds can help you lower your blood pressure. · If your doctor recommends it, get more exercise. Walking is a good choice. Bit by bit, increase the amount you walk every day. Try for at least 30 minutes on most days of the week. You also may want to swim, bike, or do other activities. · Avoid or limit alcohol. Talk to your doctor about whether you can drink any alcohol. · Try to limit how much sodium you eat to less than 2,300 milligrams (mg) a day. Your doctor may ask you to try to eat less than 1,500 mg a day. · Eat plenty of fruits (such as bananas and oranges), vegetables, legumes, whole grains, and low-fat dairy products. · Lower the amount of saturated fat in your diet. Saturated fat is found in animal products such as milk, cheese, and meat. Limiting these foods may help you lose weight and also lower your risk for heart disease. · Do not smoke. Smoking increases your risk for heart attack and stroke. If you need help quitting, talk to your doctor about stop-smoking programs and medicines. These can increase your chances of quitting for good. When should you call for help? Call  911 anytime you think you may need emergency care.  This may mean having symptoms that suggest that your blood pressure is causing a serious heart or blood vessel problem. Your blood pressure may be over 180/120. For example, call 911 if:  · You have symptoms of a heart attack. These may include:  ? Chest pain or pressure, or a strange feeling in the chest.  ? Sweating. ? Shortness of breath. ? Nausea or vomiting. ? Pain, pressure, or a strange feeling in the back, neck, jaw, or upper belly or in one or both shoulders or arms. ? Lightheadedness or sudden weakness. ? A fast or irregular heartbeat. · You have symptoms of a stroke. These may include:  ? Sudden numbness, tingling, weakness, or loss of movement in your face, arm, or leg, especially on only one side of your body. ? Sudden vision changes. ? Sudden trouble speaking. ? Sudden confusion or trouble understanding simple statements. ? Sudden problems with walking or balance. ? A sudden, severe headache that is different from past headaches. · You have severe back or belly pain. Do not wait until your blood pressure comes down on its own. Get help right away. Call your doctor now or seek immediate care if:  · Your blood pressure is much higher than normal (such as 180/120 or higher), but you don't have symptoms. · You think high blood pressure is causing symptoms, such as:  ? Severe headache.  ? Blurry vision. Watch closely for changes in your health, and be sure to contact your doctor if:  · Your blood pressure measures higher than your doctor recommends at least 2 times. That means the top number is higher or the bottom number is higher, or both. · You think you may be having side effects from your blood pressure medicine. Where can you learn more? Go to https://Six Apartiona.PixelFish. org and sign in to your Bikmo account. Enter T646 in the [employer] box to learn more about \"High Blood Pressure: Care Instructions. \"     If you do not have an account, please click on the \"Sign Up Now\" link.   Current

## 2020-09-10 NOTE — PROGRESS NOTES
CHIEF COMPLAINT  Chief Complaint   Patient presents with    Check-Up        HPI   Alecia Tristan is a 77 y.o. male who presents to the office for checkup. Patient denies any recent hospitalizations or changes to medications. Patient reports that he continues to have foot pain but was unable to get an podiatrist because of [place]. I did recommend him calling and rescheduling appointments. Patient reports taking medications as prescribed. No new unusual fatigue. No unexplained weight loss. Patient has gained approximately 15 pounds since last visit. Patient reports that he is sitting around more. Patient reports shortness of breath when he lays down at night until he puts his BiPAP on. No abdominal pain or discomfort. No nausea, vomiting, or diarrhea. No change in appetite. Patient denies any changes in urinary or bowel habits. Patient reports on a rare occasion he will have \"chest discomfort. \"  Patient unable to elaborate but reports it is very rare and cannot recall the last episode. Patient did have a fall approximately 3 months ago. Patient reports that he stumbled over the 2 stripping from the bathroom causing him to fall forward. No head injury or loss of consciousness. No other complaints, modifying factors or associated symptoms. Nursing notes reviewed.    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     severe    Hearing loss     decreased hearing left ear    Hyperlipidemia     Hypertension     pos GXT 3/01, neg GXT 3/07    Narcolepsy     Osteoarthritis     Sleep apnea     bipap    Superficial peroneal nerve neuropathy     bilateral     Past Surgical History:   Procedure Laterality Date    APPENDECTOMY      CHOLECYSTECTOMY      COLONOSCOPY  12/19/11    KNEE SURGERY Right 04/22/2016    Rt total knee    OTHER SURGICAL HISTORY Right 09/01/2016    RIGHT TOTAL SHOULDER REPLACEMENT          SHOULDER ARTHROPLASTY  2012    Lt total shoulder arthroplasty    TOTAL KNEE ARTHROPLASTY Left 2016     Family History   Problem Relation Age of Onset    Cancer Mother         breast    High Blood Pressure Mother     Arthritis Father     Depression Father     Hearing Loss Father     Heart Disease Father     Stroke Father     Vision Loss Father      Social History     Socioeconomic History    Marital status:      Spouse name: Not on file    Number of children: Not on file    Years of education: Not on file    Highest education level: Not on file   Occupational History    Not on file   Social Needs    Financial resource strain: Not on file    Food insecurity     Worry: Not on file     Inability: Not on file    Transportation needs     Medical: Not on file     Non-medical: Not on file   Tobacco Use    Smoking status: Former Smoker     Packs/day: 3.00     Years: 32.00     Pack years: 96.00     Last attempt to quit: 1999     Years since quittin.6    Smokeless tobacco: Never Used   Substance and Sexual Activity    Alcohol use: No    Drug use: No    Sexual activity: Not on file   Lifestyle    Physical activity     Days per week: Not on file     Minutes per session: Not on file    Stress: Not on file   Relationships    Social connections     Talks on phone: Not on file     Gets together: Not on file     Attends Denominational service: Not on file     Active member of club or organization: Not on file     Attends meetings of clubs or organizations: Not on file     Relationship status: Not on file    Intimate partner violence     Fear of current or ex partner: Not on file     Emotionally abused: Not on file     Physically abused: Not on file     Forced sexual activity: Not on file   Other Topics Concern    Not on file   Social History Narrative    Not on file     Current Outpatient Medications   Medication Sig Dispense Refill    atenolol (TENORMIN) 100 MG tablet TAKE ONE TABLET BY MOUTH DAILY 90 tablet 0    gabapentin (NEURONTIN) 300 MG capsule Take 2 capsules by mouth 3 times daily for 90 days. 540 capsule 0    FLUoxetine (PROZAC) 40 MG capsule Take 1 capsule by mouth daily 90 capsule 0    Handicap Placard MISC by Does not apply route lifetime 1 each 0    atorvastatin (LIPITOR) 20 MG tablet Take 1 tablet by mouth daily 30 tablet 3    tamsulosin (FLOMAX) 0.4 MG capsule TAKE ONE CAPSULE BY MOUTH DAILY 90 capsule 0    sulindac (CLINORIL) 200 MG tablet TAKE ONE TABLET BY MOUTH TWICE A DAY (Patient taking differently: Pt is taking 1 time daily) 60 tablet 4    cyclobenzaprine (FLEXERIL) 10 MG tablet TAKE ONE TABLET BY MOUTH FOUR TIMES A DAY AS NEEDED FOR MUSCLE SPASMS 120 tablet 1    Multiple Vitamin (THERA) TABS Take 1 tablet by mouth daily.  SAW PALMETTO Take  by mouth daily. No current facility-administered medications for this visit. Allergies   Allergen Reactions    Celebrex [Celecoxib]      Nosebleeds    Elavil [Amitriptyline Hcl]      Nose bleed    Topamax      Leg cramps       REVIEW OF SYSTEMS  Review of Systems   Constitutional: Positive for unexpected weight change. Negative for activity change, appetite change, chills and fever. HENT: Negative for congestion, rhinorrhea and sore throat. Eyes: Negative for visual disturbance. Respiratory: Negative for cough, chest tightness, shortness of breath and wheezing. Cardiovascular: Positive for leg swelling. Negative for chest pain. Gastrointestinal: Negative for abdominal distention, abdominal pain, diarrhea, nausea and vomiting. Genitourinary: Negative for dysuria, frequency, hematuria and urgency. Musculoskeletal: Positive for arthralgias and back pain. Negative for gait problem and myalgias. Skin: Negative for pallor and rash. Neurological: Negative for dizziness, weakness, light-headedness, numbness and headaches.    Psychiatric/Behavioral: Negative for behavioral problems, confusion, self-injury and sleep disturbance. The patient is not nervous/anxious. PHYSICAL EXAM  BP (!) 155/101   Pulse (!) 49   Temp 98.2 °F (36.8 °C) (Oral)   Wt (!) 340 lb 4 oz (154.3 kg)   SpO2 94%   BMI 48.82 kg/m²   Physical Exam  Vitals signs reviewed. Constitutional:       General: He is not in acute distress. Appearance: Normal appearance. He is well-developed. He is obese. He is not diaphoretic. HENT:      Head: Normocephalic and atraumatic. Right Ear: Tympanic membrane normal.      Left Ear: Tympanic membrane normal.      Nose: Nose normal.      Mouth/Throat:      Mouth: Mucous membranes are moist.      Pharynx: Oropharynx is clear. No oropharyngeal exudate or posterior oropharyngeal erythema. Eyes:      General:         Right eye: No discharge. Left eye: No discharge. Pupils: Pupils are equal, round, and reactive to light. Neck:      Musculoskeletal: Normal range of motion and neck supple. Vascular: No JVD. Cardiovascular:      Rate and Rhythm: Normal rate and regular rhythm. Pulses: Normal pulses. Dorsalis pedis pulses are 2+ on the right side and 2+ on the left side. Pulmonary:      Effort: Pulmonary effort is normal. No respiratory distress. Breath sounds: No stridor. No wheezing, rhonchi or rales. Chest:      Chest wall: No tenderness. Abdominal:      General: Bowel sounds are normal. There is no distension. Palpations: Abdomen is soft. Tenderness: There is no abdominal tenderness. There is no guarding. Musculoskeletal: Normal range of motion. General: Swelling present. No deformity. Skin:     General: Skin is warm and dry. Capillary Refill: Capillary refill takes less than 2 seconds. Coloration: Skin is not pale. Findings: No bruising, lesion or rash. Neurological:      General: No focal deficit present. Mental Status: He is alert and oriented to person, place, and time.       Motor: No weakness. Gait: Gait normal.   Psychiatric:         Mood and Affect: Mood normal.         Behavior: Behavior normal.            ASSESSMENT/PLAN:   1. Benign non-nodular prostatic hyperplasia with lower urinary tract symptoms  Stable. Patient denies any increase in urinary urgency, frequency, retention, or incontinence. Patient reports that it depends on the day. Patient takes Flomax daily. Continue with current treatment and management follow-up in 6 months, sooner for new or worsening symptoms. 2. NICOLE treated with BiPAP  Stable. Patient reports that he is short of breath when he lays flat at night until he puts his BiPAP on. Patient reports that he does wear it every night and feels well rested throughout the day. Patient has gained approximately 15 pounds since last visit. Patient encouraged to increase activity and to lose weight to help with NICOLE. Follow-up in 6 months, sooner for new or worsening symptoms. 3. Anxiety  Stable. Patient compliant with fluoxetine 40 mg daily. No adverse side effects or missed doses. Patient denies any breakthrough anxiety or panic attacks. Patient contracts for safety. Continue current treatment and management follow-up in 6 months, sooner for new or worsening symptoms.  - FLUoxetine (PROZAC) 40 MG capsule; Take 1 capsule by mouth daily  Dispense: 90 capsule; Refill: 0    4. Essential hypertension  Uncontrolled. Patient compliant with atenolol 100 mg daily. No adverse side effects or missed doses. Patient reports that blood pressure was controlled to his knowledge prior to gaining recent weight. I did highly recommend patient increasing exercise and lifestyle changes as far as monitoring dietary intake to help with weight loss and more blood pressure control. Did discuss significant risks associated with patient's obesity, uncontrolled hypertension.   Patient aware that if blood pressure is not controlled that he will need to go on additional medications continue to monitor.  - atenolol (TENORMIN) 100 MG tablet; TAKE ONE TABLET BY MOUTH DAILY  Dispense: 90 tablet; Refill: 0    5. Arthritis  Stable versus uncontrolled. Patient reports that he has noticed some worsening pain in his hands and ankles. Patient takes Clinoril 200 mg daily. Patient requesting to increase medication or add additional medications. Patient aware of risks associated with NSAID use and kidney dysfunction. I did recommend patient using Tylenol arthritis for additional pain relief. Patient reports that symptoms have worsened since he is gained weight and sitting around more. We did discuss lifestyle changes and encourage patient to stay active and lose weight to help with symptoms. Follow-up in 6 months, sooner for new or worsening symptoms. 6. Neuropathy involving both lower extremities  Stable. Patient reports taking gabapentin 600 mg 3 times a day. Patient reports that symptoms have improved since increasing medication. Patient reports wearing compression stockings daily to help with swelling. No new or worsening symptoms. Continue current treatment and management follow-up in 6 months, sooner for new or worsening symptoms.  - gabapentin (NEURONTIN) 300 MG capsule; Take 2 capsules by mouth 3 times daily for 90 days. Dispense: 540 capsule; Refill: 0    7. Encounter for screening colonoscopy  Last colonoscopy was 12/2011 with Dr Brennan Osgood. Patient had polyp removed and revealed polypoid colonic mucosa with focal changes suggestive tubular adenoma, no evidence of severe dysplasia. Per GI reports patient not due for repeat colonoscopy 10 years from previous unless developing symptoms. Will contact GI for clarification regarding if patient needed repeat 5 years versus 10. Follow-up with results and notify patient. Continue to monitor and patient aware. 8. At high risk for falls  Patient reports that he stumbles all the time.   Patient reports that previous fall was approximately 3 months ago where he tripped over the toe stripping coming out of the bathroom. Patient denies any injury associated with fall. Safety tips were discussed in detail with patient. On the basis of positive falls risk screening, assessment and plan is as follows: home safety tips provided. 9. Encounter for abdominal aortic aneurysm (AAA) screening  AAA screening discussed with patient. Patient declines at this time. Risks discussed with patient patient continues to decline. The note was completed using Dragon voice recognition transcription. Every effort was made to ensure accuracy; however, inadvertent  transcription errors may be present despite my best efforts to edit errors.     Shakeel Padilla, APRN - CNP

## 2020-10-10 PROBLEM — Z12.11 ENCOUNTER FOR SCREENING COLONOSCOPY: Status: RESOLVED | Noted: 2020-09-10 | Resolved: 2020-10-10

## 2020-12-03 RX ORDER — FLUOXETINE HYDROCHLORIDE 40 MG/1
CAPSULE ORAL
Qty: 90 CAPSULE | Refills: 1 | Status: SHIPPED | OUTPATIENT
Start: 2020-12-03 | End: 2021-12-02 | Stop reason: CLARIF

## 2020-12-03 RX ORDER — ATENOLOL 100 MG/1
TABLET ORAL
Qty: 90 TABLET | Refills: 1 | Status: SHIPPED | OUTPATIENT
Start: 2020-12-03 | End: 2022-03-11 | Stop reason: SDUPTHER

## 2020-12-03 RX ORDER — GABAPENTIN 300 MG/1
CAPSULE ORAL
Qty: 540 CAPSULE | Refills: 0 | Status: SHIPPED | OUTPATIENT
Start: 2020-12-03 | End: 2022-03-11 | Stop reason: SDUPTHER

## 2020-12-03 NOTE — TELEPHONE ENCOUNTER
Date of last refill of this med was 09/10/2020, # of pills given 540 and # of refills given 0. Their next appointment is 03/11/2021, the last date patient was seen was 09/10/2020. Does patient have medication agreement on file? Yes  Has drug screen been done in last 12 months if needed?  no

## 2021-01-22 DIAGNOSIS — R35.0 FREQUENT URINATION: ICD-10-CM

## 2021-01-22 RX ORDER — TAMSULOSIN HYDROCHLORIDE 0.4 MG/1
CAPSULE ORAL
Qty: 90 CAPSULE | Refills: 1 | Status: SHIPPED | OUTPATIENT
Start: 2021-01-22 | End: 2022-02-10 | Stop reason: SDUPTHER

## 2021-03-08 ENCOUNTER — TELEPHONE (OUTPATIENT)
Dept: FAMILY MEDICINE CLINIC | Age: 66
End: 2021-03-08

## 2021-03-08 NOTE — TELEPHONE ENCOUNTER
Patient left message on 3/6 on after hours line that he was cancelling appt on 3/11 and that he moved back to [place]

## 2021-12-02 ENCOUNTER — OFFICE VISIT (OUTPATIENT)
Dept: FAMILY MEDICINE CLINIC | Age: 66
End: 2021-12-02
Payer: MEDICARE

## 2021-12-02 VITALS
WEIGHT: 315 LBS | BODY MASS INDEX: 46.49 KG/M2 | OXYGEN SATURATION: 95 % | HEART RATE: 68 BPM | TEMPERATURE: 98.7 F | SYSTOLIC BLOOD PRESSURE: 158 MMHG | DIASTOLIC BLOOD PRESSURE: 89 MMHG

## 2021-12-02 DIAGNOSIS — G47.33 OSA TREATED WITH BIPAP: ICD-10-CM

## 2021-12-02 DIAGNOSIS — N40.1 BENIGN NON-NODULAR PROSTATIC HYPERPLASIA WITH LOWER URINARY TRACT SYMPTOMS: ICD-10-CM

## 2021-12-02 DIAGNOSIS — Z12.11 COLON CANCER SCREENING: ICD-10-CM

## 2021-12-02 DIAGNOSIS — G57.93 NEUROPATHY INVOLVING BOTH LOWER EXTREMITIES: ICD-10-CM

## 2021-12-02 DIAGNOSIS — M19.90 ARTHRITIS: ICD-10-CM

## 2021-12-02 DIAGNOSIS — I10 ESSENTIAL HYPERTENSION: ICD-10-CM

## 2021-12-02 DIAGNOSIS — Z13.6 ENCOUNTER FOR ABDOMINAL AORTIC ANEURYSM (AAA) SCREENING: ICD-10-CM

## 2021-12-02 DIAGNOSIS — E55.9 VITAMIN D DEFICIENCY: ICD-10-CM

## 2021-12-02 DIAGNOSIS — F41.9 ANXIETY: Primary | ICD-10-CM

## 2021-12-02 DIAGNOSIS — Z12.5 ENCOUNTER FOR SCREENING FOR MALIGNANT NEOPLASM OF PROSTATE: ICD-10-CM

## 2021-12-02 DIAGNOSIS — J45.20 MILD INTERMITTENT ASTHMA WITHOUT COMPLICATION: ICD-10-CM

## 2021-12-02 DIAGNOSIS — E78.00 HYPERCHOLESTEREMIA: ICD-10-CM

## 2021-12-02 PROCEDURE — 3017F COLORECTAL CA SCREEN DOC REV: CPT | Performed by: NURSE PRACTITIONER

## 2021-12-02 PROCEDURE — G8482 FLU IMMUNIZE ORDER/ADMIN: HCPCS | Performed by: NURSE PRACTITIONER

## 2021-12-02 PROCEDURE — 4040F PNEUMOC VAC/ADMIN/RCVD: CPT | Performed by: NURSE PRACTITIONER

## 2021-12-02 PROCEDURE — G8427 DOCREV CUR MEDS BY ELIG CLIN: HCPCS | Performed by: NURSE PRACTITIONER

## 2021-12-02 PROCEDURE — 99214 OFFICE O/P EST MOD 30 MIN: CPT | Performed by: NURSE PRACTITIONER

## 2021-12-02 PROCEDURE — G8417 CALC BMI ABV UP PARAM F/U: HCPCS | Performed by: NURSE PRACTITIONER

## 2021-12-02 PROCEDURE — 1123F ACP DISCUSS/DSCN MKR DOCD: CPT | Performed by: NURSE PRACTITIONER

## 2021-12-02 PROCEDURE — 1036F TOBACCO NON-USER: CPT | Performed by: NURSE PRACTITIONER

## 2021-12-02 RX ORDER — DULOXETIN HYDROCHLORIDE 60 MG/1
60 CAPSULE, DELAYED RELEASE ORAL DAILY
Qty: 90 CAPSULE | Refills: 1 | Status: SHIPPED | OUTPATIENT
Start: 2021-12-02 | End: 2022-05-19

## 2021-12-02 RX ORDER — SILDENAFIL 50 MG/1
50 TABLET, FILM COATED ORAL PRN
COMMUNITY

## 2021-12-02 RX ORDER — DULOXETIN HYDROCHLORIDE 60 MG/1
60 CAPSULE, DELAYED RELEASE ORAL DAILY
COMMUNITY
End: 2021-12-02 | Stop reason: SDUPTHER

## 2021-12-02 ASSESSMENT — PATIENT HEALTH QUESTIONNAIRE - PHQ9
SUM OF ALL RESPONSES TO PHQ QUESTIONS 1-9: 0
1. LITTLE INTEREST OR PLEASURE IN DOING THINGS: 0
2. FEELING DOWN, DEPRESSED OR HOPELESS: 0
SUM OF ALL RESPONSES TO PHQ9 QUESTIONS 1 & 2: 0

## 2021-12-02 ASSESSMENT — ENCOUNTER SYMPTOMS
RHINORRHEA: 0
SORE THROAT: 0
WHEEZING: 0
CHEST TIGHTNESS: 0
SHORTNESS OF BREATH: 0
DIARRHEA: 0
VOMITING: 0
COUGH: 0
BACK PAIN: 1
ABDOMINAL PAIN: 0
NAUSEA: 0
ABDOMINAL DISTENTION: 0

## 2021-12-02 NOTE — PATIENT INSTRUCTIONS
Please read the healthy family handout that you were given and share it with your family. Please compare this printed medication list with your medications at home to be sure they are the same. If you have any medications that are different please contact us immediately at [phone]. Also review your allergies that we have listed, these may also include medications that you have not been able to tolerate, make sure everything listed is correct. If you have any allergies that are different please contact us immediately at [phone].

## 2021-12-02 NOTE — PROGRESS NOTES
CHIEF COMPLAINT  Chief Complaint   Patient presents with    Check-Up     HTN        HPI   Queta Askew is a 74 y.o. male who presents to the office for checkup. Patient denies any new unusual fatigue or unexplained weight loss. No episodes of chest pain, tightness, palpitations or shortness of breath. No abdominal pain or discomfort. No nausea, vomiting, diarrhea. No dark or tarry stools. Patient reports ongoing arthritic pain. Patient reports he recently moved back from [place]. Patient reports that when he was there he did see a foot and pain specialist.  Patient reports having Cologuard performed while he was living in [place] which was positive for blood. Patient reports that he did not follow up with GI since he was moving back to [place]. Patient reports taking medications as prescribed. No other complaints, modifying factors or associated symptoms. Nursing notes reviewed.    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     severe    Hearing loss     decreased hearing left ear    Hyperlipidemia     Hypertension     pos GXT 3/01, neg GXT 3/07    Narcolepsy     Osteoarthritis     Sleep apnea     bipap    Superficial peroneal nerve neuropathy     bilateral     Past Surgical History:   Procedure Laterality Date    APPENDECTOMY      CHOLECYSTECTOMY      COLONOSCOPY  12/19/11    KNEE SURGERY Right 04/22/2016    Rt total knee    OTHER SURGICAL HISTORY Right 09/01/2016    RIGHT TOTAL SHOULDER REPLACEMENT          SHOULDER ARTHROPLASTY  6/19/2012    Lt total shoulder arthroplasty    TOTAL KNEE ARTHROPLASTY Left 07/19/2016     Family History   Problem Relation Age of Onset    Cancer Mother         breast    High Blood Pressure Mother     Arthritis Father     Depression Father     Hearing Loss Father     Heart Disease Father     Stroke Father     Vision Loss Father      Social History     Socioeconomic History    Marital status:      Spouse name: Not on file    Number of children: Not on file    Years of education: Not on file    Highest education level: Not on file   Occupational History    Not on file   Tobacco Use    Smoking status: Former Smoker     Packs/day: 3.00     Years: 32.00     Pack years: 96.00     Quit date: 1999     Years since quittin.9    Smokeless tobacco: Never Used   Substance and Sexual Activity    Alcohol use: No    Drug use: No    Sexual activity: Not on file   Other Topics Concern    Not on file   Social History Narrative    Not on file     Social Determinants of Health     Financial Resource Strain:     Difficulty of Paying Living Expenses: Not on file   Food Insecurity:     Worried About [address] in the Last Year: Not on file    [address] [facility] in the Last Year: Not on file   Transportation Needs:     Lack of Transportation (Medical): Not on file    Lack of Transportation (Non-Medical):  Not on file   Physical Activity:     Days of Exercise per Week: Not on file    Minutes of Exercise per Session: Not on file   Stress:     Feeling of Stress : Not on file   Social Connections:     Frequency of Communication with Friends and Family: Not on file    Frequency of Social Gatherings with Friends and Family: Not on file    Attends Denominational Services: Not on file    Active Member of [address] [place] [address] or Organizations: Not on file    Attends Club or Organization Meetings: Not on file    Marital Status: Not on file   Intimate Partner Violence:     Fear of Current or Ex-Partner: Not on file    Emotionally Abused: Not on file    Physically Abused: Not on file    Sexually Abused: Not on file   Housing Stability:     Unable to Pay for Housing in the Last Year: Not on file    Number of [place] in the Last Year: Not on file    Unstable Housing in the Last Year: Not on file     Current Outpatient Medications   Medication Sig Dispense Refill    DULoxetine (CYMBALTA) 60 MG extended release capsule Take 1 capsule by mouth daily 90 capsule 1    sildenafil (VIAGRA) 50 MG tablet Take 50 mg by mouth as needed for Erectile Dysfunction      Handicap Placard MISC by Does not apply route 1 each 0    tamsulosin (FLOMAX) 0.4 MG capsule TAKE ONE CAPSULE BY MOUTH DAILY (Patient taking differently: Take 0.8 mg by mouth daily TAKE ONE CAPSULE BY MOUTH DAILY) 90 capsule 1    gabapentin (NEURONTIN) 300 MG capsule TAKE 2 CAPSULES THREE TIMES DAILY 540 capsule 0    atenolol (TENORMIN) 100 MG tablet TAKE 1 TABLET EVERY DAY 90 tablet 1    atorvastatin (LIPITOR) 20 MG tablet Take 1 tablet by mouth daily 30 tablet 3    sulindac (CLINORIL) 200 MG tablet TAKE ONE TABLET BY MOUTH TWICE A DAY (Patient taking differently: Pt is taking 1 time daily) 60 tablet 4    Multiple Vitamin (THERA) TABS Take 1 tablet by mouth daily.  SAW PALMETTO Take  by mouth daily. No current facility-administered medications for this visit. Allergies   Allergen Reactions    Celebrex [Celecoxib]      Nosebleeds    Elavil [Amitriptyline Hcl]      Nose bleed    Topamax      Leg cramps       REVIEW OF SYSTEMS  Review of Systems   Constitutional: Negative for activity change, appetite change, chills and fever. HENT: Negative for congestion, rhinorrhea and sore throat. Eyes: Negative for visual disturbance. Respiratory: Negative for cough, chest tightness, shortness of breath and wheezing. Cardiovascular: Negative for chest pain and leg swelling. Gastrointestinal: Negative for abdominal distention, abdominal pain, diarrhea, nausea and vomiting. Genitourinary: Negative for dysuria, frequency, hematuria and urgency. Musculoskeletal: Positive for arthralgias and back pain. Negative for gait problem and myalgias. Skin: Negative for pallor and rash. Neurological: Positive for dizziness. Negative for weakness, light-headedness, numbness and headaches.    Psychiatric/Behavioral: Negative for behavioral problems, confusion and sleep disturbance. PHYSICAL EXAM  BP (!) 158/89   Pulse 68   Temp 98.7 °F (37.1 °C) (Oral)   Wt (!) 324 lb (147 kg)   SpO2 95%   BMI 46.49 kg/m²   Physical Exam  Vitals reviewed. Constitutional:       General: He is not in acute distress. Appearance: Normal appearance. He is well-developed. He is obese. He is not diaphoretic. HENT:      Head: Normocephalic and atraumatic. Right Ear: Tympanic membrane normal.      Left Ear: Tympanic membrane normal.      Nose: Nose normal.      Mouth/Throat:      Mouth: Mucous membranes are moist.      Pharynx: Oropharynx is clear. No oropharyngeal exudate or posterior oropharyngeal erythema. Eyes:      General:         Right eye: No discharge. Left eye: No discharge. Pupils: Pupils are equal, round, and reactive to light. Neck:      Vascular: No JVD. Cardiovascular:      Rate and Rhythm: Normal rate and regular rhythm. Pulses: Normal pulses. Dorsalis pedis pulses are 2+ on the right side and 2+ on the left side. Pulmonary:      Effort: Pulmonary effort is normal. No respiratory distress. Breath sounds: No stridor. No wheezing, rhonchi or rales. Chest:      Chest wall: No tenderness. Abdominal:      General: Bowel sounds are normal. There is no distension. Palpations: Abdomen is soft. Tenderness: There is no abdominal tenderness. There is no guarding. Musculoskeletal:         General: Swelling present. No deformity. Normal range of motion. Cervical back: Normal range of motion and neck supple. Skin:     General: Skin is warm and dry. Capillary Refill: Capillary refill takes less than 2 seconds. Coloration: Skin is not pale. Findings: No bruising, lesion or rash. Neurological:      General: No focal deficit present. Mental Status: He is alert and oriented to person, place, and time. Motor: No weakness.       Gait: Gait normal.   Psychiatric:         Mood and Affect: Mood normal.         Behavior: Behavior normal.        ASSESSMENT/PLAN:   1. Anxiety  Stable. Patient compliant with Cymbalta 60 mg daily. Patient reports overall doing well. Continue current treatment management follow-up in 6 months, sooner for new or worsening symptoms. 2. Essential hypertension  Stable. Blood pressure slightly elevated today. Patient compliant with atenolol 100 mg daily. We did discuss the importance of monitoring diet, weight loss, exercise and checking blood pressure on a regular basis. Patient instructed on checking blood pressure over the next several days and when he returns to have fasting lab work performed bring readings with him. We did discuss that without lifestyle changes he may need to go on additional medications. Patient verbalized and acknowledges with plan of care at this time. - CBC Auto Differential; Future  - COMPREHENSIVE METABOLIC PANEL; Future    3. Hypercholesteremia  Stable. Labs ordered and pending. Patient compliant with atorvastatin 20 mg nightly. Continue with current treatment management follow-up in 6 months, sooner for new or worsening symptoms.  - Lipid, Fasting; Future    4. NICOLE treated with BiPAP  Stable. Patient compliant with use of BiPAP. Continue current treatment management follow-up in 6 months, sooner for new or worsening symptoms. 5. Mild intermittent asthma without complication  Stable. No recent exacerbations. Continue current treatment management follow-up in 6 to 12 months, sooner for new or worsening symptoms. 6. Neuropathy involving both lower extremities  Stable versus uncontrolled. Patient reports all over her arthritic pain and neuropathy. Patient compliant with duloxetine 60 mg daily. Patient reports that when he was in [place] he did see a foot specialist as well as pain management. Patient reports that he did receive injections which did seem to help.   Patient also reports being placed on Vicodin at that time. Patient unsure of who his insurance will cover therefore I did recommend him and his wife to call her insurance company to verify who is in network prior to referral to being placed for pain management or orthopedics. Patient verbalized and acknowledges with plan of care at this time. - DULoxetine (CYMBALTA) 60 MG extended release capsule; Take 1 capsule by mouth daily  Dispense: 90 capsule; Refill: 1  - Handicap Placard MISC; by Does not apply route  Dispense: 1 each; Refill: 0    7. Benign non-nodular prostatic hyperplasia with lower urinary tract symptoms/Encounter for screening for malignant neoplasm of prostate   Stable. Patient compliant with Flomax 0.8 mg daily. Patient denies any new or worsening urinary complaints. PSA ordered and pending. Continue with current treatment management at this time. Follow-up in 6 months, sooner for new or worsening symptoms.  - Psa screening; Future    8. Vitamin D deficiency  Stable. Labs ordered and pending. Reports take a multivitamin daily. Continue current treatment management follow-up in 6 months, sooner for new or worsening symptoms. 9. Colon cancer screening  Preventative screening recommended. Patient reports having Cologuard performed while he was living in [place] in August which was positive. Patient reports that he did not follow-up with anyone since he was moving back here. Referral placed for GI for colonoscopy. Patient denies any abdominal pain, bloating, dark tarry stools. - Suyapa Beaver MD, Gastroenterology, [facility]    10. Encounter for abdominal aortic aneurysm (AAA) screening  Preventative screening recommended. Orders placed for ultrasound. Patient encouraged to call and schedule testing. We will follow-up with results. - US SCREENING FOR AAA; Future    11. Arthritis  See above #6  - Handicap Placard MISC; by Does not apply route  Dispense: 1 each;  Refill: 0         The note was completed using Dragon voice recognition transcription. Every effort was made to ensure accuracy; however, inadvertent  transcription errors may be present despite my best efforts to edit errors.     Tuan Go, JASEN - CNP

## 2021-12-07 ENCOUNTER — TELEPHONE (OUTPATIENT)
Dept: FAMILY MEDICINE CLINIC | Age: 66
End: 2021-12-07

## 2021-12-07 ENCOUNTER — NURSE ONLY (OUTPATIENT)
Dept: FAMILY MEDICINE CLINIC | Age: 66
End: 2021-12-07
Payer: MEDICARE

## 2021-12-07 DIAGNOSIS — E78.00 HYPERCHOLESTEREMIA: ICD-10-CM

## 2021-12-07 DIAGNOSIS — I10 ESSENTIAL HYPERTENSION: ICD-10-CM

## 2021-12-07 DIAGNOSIS — E55.9 VITAMIN D DEFICIENCY: ICD-10-CM

## 2021-12-07 DIAGNOSIS — I10 ESSENTIAL HYPERTENSION: Primary | ICD-10-CM

## 2021-12-07 DIAGNOSIS — N40.1 BENIGN NON-NODULAR PROSTATIC HYPERPLASIA WITH LOWER URINARY TRACT SYMPTOMS: ICD-10-CM

## 2021-12-07 DIAGNOSIS — Z12.5 ENCOUNTER FOR SCREENING FOR MALIGNANT NEOPLASM OF PROSTATE: ICD-10-CM

## 2021-12-07 LAB
A/G RATIO: 2 (ref 1.1–2.2)
ALBUMIN SERPL-MCNC: 4.6 G/DL (ref 3.4–5)
ALP BLD-CCNC: 87 U/L (ref 40–129)
ALT SERPL-CCNC: 17 U/L (ref 10–40)
ANION GAP SERPL CALCULATED.3IONS-SCNC: 15 MMOL/L (ref 3–16)
AST SERPL-CCNC: 17 U/L (ref 15–37)
BASOPHILS ABSOLUTE: 0.1 K/UL (ref 0–0.2)
BASOPHILS RELATIVE PERCENT: 0.9 %
BILIRUB SERPL-MCNC: 0.6 MG/DL (ref 0–1)
BUN BLDV-MCNC: 15 MG/DL (ref 7–20)
CALCIUM SERPL-MCNC: 9.6 MG/DL (ref 8.3–10.6)
CHLORIDE BLD-SCNC: 104 MMOL/L (ref 99–110)
CHOLESTEROL, FASTING: 129 MG/DL (ref 0–199)
CO2: 23 MMOL/L (ref 21–32)
CREAT SERPL-MCNC: 0.9 MG/DL (ref 0.8–1.3)
EOSINOPHILS ABSOLUTE: 0.3 K/UL (ref 0–0.6)
EOSINOPHILS RELATIVE PERCENT: 4.6 %
GFR AFRICAN AMERICAN: >60
GFR NON-AFRICAN AMERICAN: >60
GLUCOSE BLD-MCNC: 97 MG/DL (ref 70–99)
HCT VFR BLD CALC: 42.8 % (ref 40.5–52.5)
HDLC SERPL-MCNC: 51 MG/DL (ref 40–60)
HEMOGLOBIN: 14.2 G/DL (ref 13.5–17.5)
LDL CHOLESTEROL CALCULATED: 56 MG/DL
LYMPHOCYTES ABSOLUTE: 1.6 K/UL (ref 1–5.1)
LYMPHOCYTES RELATIVE PERCENT: 24.9 %
MCH RBC QN AUTO: 29.1 PG (ref 26–34)
MCHC RBC AUTO-ENTMCNC: 33.1 G/DL (ref 31–36)
MCV RBC AUTO: 88.1 FL (ref 80–100)
MONOCYTES ABSOLUTE: 0.6 K/UL (ref 0–1.3)
MONOCYTES RELATIVE PERCENT: 9.8 %
NEUTROPHILS ABSOLUTE: 3.9 K/UL (ref 1.7–7.7)
NEUTROPHILS RELATIVE PERCENT: 59.8 %
PDW BLD-RTO: 14.8 % (ref 12.4–15.4)
PLATELET # BLD: 172 K/UL (ref 135–450)
PMV BLD AUTO: 9.2 FL (ref 5–10.5)
POTASSIUM SERPL-SCNC: 4.4 MMOL/L (ref 3.5–5.1)
PROSTATE SPECIFIC ANTIGEN: 2.3 NG/ML (ref 0–4)
RBC # BLD: 4.86 M/UL (ref 4.2–5.9)
SODIUM BLD-SCNC: 142 MMOL/L (ref 136–145)
TOTAL PROTEIN: 6.9 G/DL (ref 6.4–8.2)
TRIGLYCERIDE, FASTING: 109 MG/DL (ref 0–150)
VITAMIN D 25-HYDROXY: 36.3 NG/ML
VLDLC SERPL CALC-MCNC: 22 MG/DL
WBC # BLD: 6.6 K/UL (ref 4–11)

## 2021-12-07 PROCEDURE — 36415 COLL VENOUS BLD VENIPUNCTURE: CPT | Performed by: NURSE PRACTITIONER

## 2021-12-07 RX ORDER — LISINOPRIL 5 MG/1
5 TABLET ORAL DAILY
Qty: 30 TABLET | Refills: 5 | Status: SHIPPED | OUTPATIENT
Start: 2021-12-07 | End: 2022-01-13

## 2021-12-07 NOTE — TELEPHONE ENCOUNTER
I would recommend that patient start additional blood pressure medications. Prescription was sent to pharmacy for lisinopril 5 mg daily. Patient will need to continue checking blood pressure daily as well as lifestyle modifications.   Patient to follow-up in 1 month for blood pressure check and evaluation

## 2022-01-08 ENCOUNTER — HOSPITAL ENCOUNTER (OUTPATIENT)
Dept: ULTRASOUND IMAGING | Age: 67
Discharge: HOME | End: 2022-01-08
Payer: MEDICARE

## 2022-01-08 DIAGNOSIS — Z13.6 ENCOUNTER FOR ABDOMINAL AORTIC ANEURYSM (AAA) SCREENING: ICD-10-CM

## 2022-01-08 PROCEDURE — 76706 US ABDL AORTA SCREEN AAA: CPT

## 2022-01-13 ENCOUNTER — OFFICE VISIT (OUTPATIENT)
Dept: FAMILY MEDICINE CLINIC | Age: 67
End: 2022-01-13
Payer: MEDICARE

## 2022-01-13 VITALS
OXYGEN SATURATION: 95 % | SYSTOLIC BLOOD PRESSURE: 172 MMHG | TEMPERATURE: 98.7 F | DIASTOLIC BLOOD PRESSURE: 90 MMHG | HEART RATE: 51 BPM | BODY MASS INDEX: 46.63 KG/M2 | WEIGHT: 315 LBS

## 2022-01-13 DIAGNOSIS — I10 ESSENTIAL HYPERTENSION: Primary | ICD-10-CM

## 2022-01-13 DIAGNOSIS — H57.89 EYE IRRITATION: ICD-10-CM

## 2022-01-13 PROCEDURE — G8482 FLU IMMUNIZE ORDER/ADMIN: HCPCS | Performed by: NURSE PRACTITIONER

## 2022-01-13 PROCEDURE — 3017F COLORECTAL CA SCREEN DOC REV: CPT | Performed by: NURSE PRACTITIONER

## 2022-01-13 PROCEDURE — 1036F TOBACCO NON-USER: CPT | Performed by: NURSE PRACTITIONER

## 2022-01-13 PROCEDURE — G8427 DOCREV CUR MEDS BY ELIG CLIN: HCPCS | Performed by: NURSE PRACTITIONER

## 2022-01-13 PROCEDURE — 4040F PNEUMOC VAC/ADMIN/RCVD: CPT | Performed by: NURSE PRACTITIONER

## 2022-01-13 PROCEDURE — G8417 CALC BMI ABV UP PARAM F/U: HCPCS | Performed by: NURSE PRACTITIONER

## 2022-01-13 PROCEDURE — 99213 OFFICE O/P EST LOW 20 MIN: CPT | Performed by: NURSE PRACTITIONER

## 2022-01-13 PROCEDURE — 1123F ACP DISCUSS/DSCN MKR DOCD: CPT | Performed by: NURSE PRACTITIONER

## 2022-01-13 RX ORDER — LISINOPRIL 10 MG/1
10 TABLET ORAL DAILY
Qty: 30 TABLET | Refills: 1 | Status: SHIPPED | OUTPATIENT
Start: 2022-01-13 | End: 2022-02-07 | Stop reason: SDUPTHER

## 2022-01-13 ASSESSMENT — ENCOUNTER SYMPTOMS
EYE REDNESS: 0
EYE DISCHARGE: 0
PHOTOPHOBIA: 0
GASTROINTESTINAL NEGATIVE: 1
RESPIRATORY NEGATIVE: 1
EYE PAIN: 0

## 2022-01-13 NOTE — PATIENT INSTRUCTIONS
Please read the healthy family handout that you were given and share it with your family. Please compare this printed medication list with your medications at home to be sure they are the same. If you have any medications that are different please contact us immediately at [phone]. Also review your allergies that we have listed, these may also include medications that you have not been able to tolerate, make sure everything listed is correct. If you have any allergies that are different please contact us immediately at [phone].

## 2022-01-13 NOTE — PROGRESS NOTES
CHIEF COMPLAINT  Chief Complaint   Patient presents with    Hypertension     follow up        HPI   Edsel Rubinstein is a 74 y.o. male who presents to the office 1 month follow-up regarding elevated blood pressure readings. Patient was previously put on lisinopril and conjunction with previously prescribed medications. Patient reports taking it every day but blood pressure continues to be elevated. No episodes of dizziness, lightheadedness, chest pain or shortness of breath. Patient has been checking blood pressure daily. Patient reports on average 140s to 190s over 90s to 100s. Patient also reports irritation in his right eye since yesterday. 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 Patient reports that he has irrigated and flushed out his eye continuously but still feels irritated. Patient denies any pain or blurred vision. 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 No other complaints, modifying factors or associated symptoms. Nursing notes reviewed.    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     severe    Hearing loss     decreased hearing left ear    Hyperlipidemia     Hypertension     pos GXT 3/01, neg GXT 3/07    Narcolepsy     Osteoarthritis     Sleep apnea     bipap    Superficial peroneal nerve neuropathy     bilateral     Past Surgical History:   Procedure Laterality Date    APPENDECTOMY      CHOLECYSTECTOMY      COLONOSCOPY  12/19/11    KNEE SURGERY Right 04/22/2016    Rt total knee    OTHER SURGICAL HISTORY Right 09/01/2016    RIGHT TOTAL SHOULDER REPLACEMENT          SHOULDER ARTHROPLASTY  6/19/2012    Lt total shoulder arthroplasty    TOTAL KNEE ARTHROPLASTY Left 07/19/2016     Family History   Problem Relation Age of Onset    Cancer Mother         breast    High Blood Pressure Mother     Arthritis Father     Depression Father    Ellen Alejandro Hearing Loss Father     Heart Disease Father     Stroke Father     Vision Loss Father      Social History     Socioeconomic History    Marital status:      Spouse name: Not on file    Number of children: Not on file    Years of education: Not on file    Highest education level: Not on file   Occupational History    Not on file   Tobacco Use    Smoking status: Former Smoker     Packs/day: 3.00     Years: 32.00     Pack years: 96.00     Quit date: 1999     Years since quittin.0    Smokeless tobacco: Never Used   Substance and Sexual Activity    Alcohol use: No    Drug use: No    Sexual activity: Not on file   Other Topics Concern    Not on file   Social History Narrative    Not on file     Social Determinants of Health     Financial Resource Strain:     Difficulty of Paying Living Expenses: Not on file   Food Insecurity:     Worried About [address] in the Last Year: Not on file    [employer] in the Last Year: Not on file   Transportation Needs:     Lack of Transportation (Medical): Not on file    Lack of Transportation (Non-Medical):  Not on file   Physical Activity:     Days of Exercise per Week: Not on file    Minutes of Exercise per Session: Not on file   Stress:     Feeling of Stress : Not on file   Social Connections:     Frequency of Communication with Friends and Family: Not on file    Frequency of Social Gatherings with Friends and Family: Not on file    Attends Tenriism Services: Not on file    Active Member of Clubs or Organizations: Not on file    Attends Club or Organization Meetings: Not on file    Marital Status: Not on file   Intimate Partner Violence:     Fear of Current or Ex-Partner: Not on file    Emotionally Abused: Not on file    Physically Abused: Not on file    Sexually Abused: Not on file   Housing Stability:     Unable to Pay for Housing in the Last Year: Not on file    Number of [place] in the Last Year: Not on file    Unstable Housing in the Last Year: Not on file     Current Outpatient Medications   Medication Sig Dispense Refill    lisinopril (PRINIVIL;ZESTRIL) 10 MG tablet Take 1 tablet by mouth daily 30 tablet 1    DULoxetine (CYMBALTA) 60 MG extended release capsule Take 1 capsule by mouth daily 90 capsule 1    sildenafil (VIAGRA) 50 MG tablet Take 50 mg by mouth as needed for Erectile Dysfunction      Handicap Placard MISC by Does not apply route 1 each 0    tamsulosin (FLOMAX) 0.4 MG capsule TAKE ONE CAPSULE BY MOUTH DAILY (Patient taking differently: Take 0.8 mg by mouth daily TAKE ONE CAPSULE BY MOUTH DAILY) 90 capsule 1    atenolol (TENORMIN) 100 MG tablet TAKE 1 TABLET EVERY DAY 90 tablet 1    atorvastatin (LIPITOR) 20 MG tablet Take 1 tablet by mouth daily 30 tablet 3    sulindac (CLINORIL) 200 MG tablet TAKE ONE TABLET BY MOUTH TWICE A DAY (Patient taking differently: Pt is taking 1 time daily) 60 tablet 4    Multiple Vitamin (THERA) TABS Take 1 tablet by mouth daily.  SAW PALMETTO Take  by mouth daily.  gabapentin (NEURONTIN) 300 MG capsule TAKE 2 CAPSULES THREE TIMES DAILY 540 capsule 0     No current facility-administered medications for this visit. Allergies   Allergen Reactions    Celebrex [Celecoxib]      Nosebleeds    Elavil [Amitriptyline Hcl]      Nose bleed    Topamax      Leg cramps       REVIEW OF SYSTEMS  Review of Systems   HENT: Negative. Eyes: Negative for photophobia, pain, discharge, redness and visual disturbance. Irritation in right eye   Respiratory: Negative. Cardiovascular: Negative. Gastrointestinal: Negative. Genitourinary: Negative. Musculoskeletal: Negative. Skin: Negative. Psychiatric/Behavioral: Negative. PHYSICAL EXAM  BP (!) 172/90   Pulse 51   Temp 98.7 °F (37.1 °C) (Oral)   Wt (!) 325 lb (147.4 kg)   SpO2 95%   BMI 46.63 kg/m²   Physical Exam  Constitutional:       Appearance: Normal appearance.  He is not toxic-appearing. HENT:      Head: Normocephalic. Right Ear: Tympanic membrane normal.      Nose: Nose normal.      Mouth/Throat:      Pharynx: Oropharynx is clear. Eyes:      General: No scleral icterus. Right eye: No foreign body or discharge. Left eye: No discharge. Extraocular Movements: Extraocular movements intact. Conjunctiva/sclera: Conjunctivae normal.      Pupils: Pupils are equal, round, and reactive to light. Cardiovascular:      Rate and Rhythm: Normal rate. Pulses: Normal pulses. Pulmonary:      Effort: Pulmonary effort is normal.      Breath sounds: Normal breath sounds. Musculoskeletal:         General: Normal range of motion. Cervical back: Normal range of motion. Skin:     General: Skin is warm and dry. Capillary Refill: Capillary refill takes less than 2 seconds. Neurological:      Mental Status: He is alert and oriented to person, place, and time. Psychiatric:         Mood and Affect: Mood normal.         Behavior: Behavior normal.       ASSESSMENT/PLAN:   1. Essential hypertension  Able but elevated. Patient presents today for 1 month follow-up regarding blood pressure management. Patient was previously placed on lisinopril 5 mg daily. Patient currently takes atenolol 100 mg daily. Patient reports checking blood pressure daily and continues to have elevated readings. Patient's blood pressure fluctuates anywhere between 140s to 190s over 90s to 100s. Patient asymptomatic however I do feel that patient would benefit from increased adjustment to the lisinopril at this time. Patient denies any adverse side effects of the medication. Dose will be increased to 10 mg daily and patient will follow-up in 1 month, sooner for new or worsening symptoms. Patient verbalized and acknowledges with plan of care at this time. - lisinopril (PRINIVIL;ZESTRIL) 10 MG tablet; Take 1 tablet by mouth daily  Dispense: 30 tablet; Refill: 1    2.  Eye irritation  Patient reports irritation in his right eye since yesterday. 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 Patient reports that he is unsure if a piece of \"sleg or rust went into his eye. \"  Patient reports that he did irrigated out copiously with saline solution. Patient denies any pain or difficulty with vision. Patient reports just feeling very irritated. Patient denies any drainage or discharge is clear watery eyes. See below for procedure note     PROCEDURE:   Visual acuity documented by the nursing staff, which I also reviewed. Pupils are equal, round and reactive to light, and accommodation. Pupil size is approximately 3 mm bilaterally with extraocular movements intact bilaterally. No hyphema or hypopyon. Conjunctival injection is not appreciated. All of the conjunctival surfaces were examined and the upper eyelid was examined underneath. Tetracaine was administered to the affected eye, followed by fluorescein stain. The eye was examined with the ultraviolet [place]. No foreign body was noted. Corneal abrasion not noted. Patient tolerated procedure without any complications. We did discuss the possibility of irritation from flushing out his eyes. No signs of infection or corneal abrasion at this time therefore I did recommend that patient keep monitoring his symptoms however if they worsen or progress he would need to be reevaluated. I did recommend following up with ophthalmologist as well. Given referral to ophthalmology. The note was completed using Dragon voice recognition transcription. Every effort was made to ensure accuracy; however, inadvertent  transcription errors may be present despite my best efforts to edit errors.     Sonal Brown, JASEN - CNP

## 2022-01-24 ENCOUNTER — VIRTUAL VISIT (OUTPATIENT)
Dept: FAMILY MEDICINE CLINIC | Age: 67
End: 2022-01-24
Payer: MEDICARE

## 2022-01-24 DIAGNOSIS — Z00.00 ROUTINE GENERAL MEDICAL EXAMINATION AT A HEALTH CARE FACILITY: Primary | ICD-10-CM

## 2022-01-24 PROCEDURE — 1123F ACP DISCUSS/DSCN MKR DOCD: CPT | Performed by: NURSE PRACTITIONER

## 2022-01-24 PROCEDURE — G8482 FLU IMMUNIZE ORDER/ADMIN: HCPCS | Performed by: NURSE PRACTITIONER

## 2022-01-24 PROCEDURE — G0438 PPPS, INITIAL VISIT: HCPCS | Performed by: NURSE PRACTITIONER

## 2022-01-24 PROCEDURE — 4040F PNEUMOC VAC/ADMIN/RCVD: CPT | Performed by: NURSE PRACTITIONER

## 2022-01-24 PROCEDURE — 3017F COLORECTAL CA SCREEN DOC REV: CPT | Performed by: NURSE PRACTITIONER

## 2022-01-24 ASSESSMENT — PATIENT HEALTH QUESTIONNAIRE - PHQ9
SUM OF ALL RESPONSES TO PHQ QUESTIONS 1-9: 0
1. LITTLE INTEREST OR PLEASURE IN DOING THINGS: 0
SUM OF ALL RESPONSES TO PHQ QUESTIONS 1-9: 0
SUM OF ALL RESPONSES TO PHQ QUESTIONS 1-9: 0
SUM OF ALL RESPONSES TO PHQ9 QUESTIONS 1 & 2: 0
2. FEELING DOWN, DEPRESSED OR HOPELESS: 0
SUM OF ALL RESPONSES TO PHQ QUESTIONS 1-9: 0

## 2022-01-24 ASSESSMENT — LIFESTYLE VARIABLES
HOW OFTEN DO YOU HAVE A DRINK CONTAINING ALCOHOL: 0
AUDIT-C TOTAL SCORE: INCOMPLETE
HOW OFTEN DO YOU HAVE A DRINK CONTAINING ALCOHOL: NEVER
AUDIT TOTAL SCORE: INCOMPLETE

## 2022-01-24 NOTE — PATIENT INSTRUCTIONS
Please read the healthy family handout that you were given and share it with your family. Please compare this printed medication list with your medications at home to be sure they are the same. If you have any medications that are different please contact us immediately at [phone]. Also review your allergies that we have listed, these may also include medications that you have not been able to tolerate, make sure everything listed is correct. If you have any allergies that are different please contact us immediately at [phone]. Personalized Preventive Plan for Edsel Rubinstein - 1/24/2022  Medicare offers a range of preventive health benefits. Some of the tests and screenings are paid in full while other may be subject to a deductible, co-insurance, and/or copay. Some of these benefits include a comprehensive review of your medical history including lifestyle, illnesses that may run in your family, and various assessments and screenings as appropriate. After reviewing your medical record and screening and assessments performed today your provider may have ordered immunizations, labs, imaging, and/or referrals for you. A list of these orders (if applicable) as well as your Preventive Care list are included within your After Visit Summary for your review. Other Preventive Recommendations:    · A preventive eye exam performed by an eye specialist is recommended every 1-2 years to screen for glaucoma; cataracts, macular degeneration, and other eye disorders. · A preventive dental visit is recommended every 6 months. · Try to get at least 150 minutes of exercise per week or 10,000 steps per day on a pedometer . · Order or download the FREE \"Exercise & Physical Activity: Your Everyday Guide\" from The "[employer]. Call 7-541.348.7009 or search The [employer] online. · You need 5524-0724 mg of calcium and 6049-3341 IU of vitamin D per day.  It is possible to meet your calcium requirement with diet alone, but a vitamin D supplement is usually necessary to meet this goal.  · When exposed to the sun, use a sunscreen that protects against both UVA and UVB radiation with an SPF of 30 or greater. Reapply every 2 to 3 hours or after sweating, drying off with a towel, or swimming. · Always wear a seat belt when traveling in a car. Always wear a helmet when riding a bicycle or motorcycle.

## 2022-01-24 NOTE — PROGRESS NOTES
Medicare Annual Wellness Visit  Are Name: Rodriguez Barry Date: 2022   MRN: <[id number]> Sex: Male   Age: 74 y.o. Ethnicity: Non- / Non    : 1955 Race: White (non-)      Rodriguez Alejandro is here for Medicare AWV    Screenings for behavioral, psychosocial and functional/safety risks, and cognitive dysfunction are all negative except as indicated below. These results, as well as other patient data from the [address] form, are documented in Flowsheets linked to this Encounter. Allergies   Allergen Reactions    Celebrex [Celecoxib]      Nosebleeds    Elavil [Amitriptyline Hcl]      Nose bleed    Topamax      Leg cramps         Prior to Visit Medications    Medication Sig Taking? Authorizing Provider   lisinopril (PRINIVIL;ZESTRIL) 10 MG tablet Take 1 tablet by mouth daily Yes JASEN Watson CNP   DULoxetine (CYMBALTA) 60 MG extended release capsule Take 1 capsule by mouth daily Yes JASEN Watson CNP   sildenafil (VIAGRA) 50 MG tablet Take 50 mg by mouth as needed for Erectile Dysfunction Yes Historical Provider, MD   Handicap Placard MISC by Does not apply route Yes JASEN Watson CNP   tamsulosin (FLOMAX) 0.4 MG capsule TAKE ONE CAPSULE BY MOUTH DAILY  Patient taking differently: Take 0.8 mg by mouth daily TAKE ONE CAPSULE BY MOUTH DAILY Yes JASEN Watson CNP   atenolol (TENORMIN) 100 MG tablet TAKE 1 TABLET EVERY DAY Yes JASEN Watson CNP   atorvastatin (LIPITOR) 20 MG tablet Take 1 tablet by mouth daily Yes JASEN Maciel CNP   sulindac (CLINORIL) 200 MG tablet TAKE ONE TABLET BY MOUTH TWICE A DAY  Patient taking differently: Pt is taking 1 time daily Yes JASEN Maciel CNP   Multiple Vitamin (THERA) TABS Take 1 tablet by mouth daily. Yes Historical Provider, MD KRIS HUNG Take  by mouth daily.  Yes Historical Provider, MD   gabapentin (NEURONTIN) 300 MG capsule TAKE 2 CAPSULES THREE TIMES DAILY  Imani JASEN Sahu CNP         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     severe    Hearing loss     decreased hearing left ear    Hyperlipidemia     Hypertension     pos GXT 3/01, neg GXT 3/07    Narcolepsy     Osteoarthritis     Sleep apnea     bipap    Superficial peroneal nerve neuropathy     bilateral       Past Surgical History:   Procedure Laterality Date    APPENDECTOMY      CHOLECYSTECTOMY      COLONOSCOPY  12/19/11    KNEE SURGERY Right 04/22/2016    Rt total knee    OTHER SURGICAL HISTORY Right 09/01/2016    RIGHT TOTAL SHOULDER REPLACEMENT          SHOULDER ARTHROPLASTY  6/19/2012    Lt total shoulder arthroplasty    TOTAL KNEE ARTHROPLASTY Left 07/19/2016         Family History   Problem Relation Age of Onset    Cancer Mother         breast    High Blood Pressure Mother     Arthritis Father     Depression Father     Hearing Loss Father     Heart Disease Father     Stroke Father     Vision Loss Father        CareTeam (Including outside providers/suppliers regularly involved in providing care):   Patient Care Team:  JASEN Suazo CNP as PCP - General (Nurse Practitioner)  JASEN Suazo CNP as PCP - [facility] Empaneled Provider  Anita Anguiano MD (Orthopedic Surgery)  Rik Doshi as Physician Assistant (Orthopedic Surgery)    Wt Readings from Last 3 Encounters:   01/13/22 (!) 325 lb (147.4 kg)   12/02/21 (!) 324 lb (147 kg)   09/10/20 (!) 340 lb 4 oz (154.3 kg)      Patient-Reported Vitals 1/24/2022   Patient-Reported Systolic 753   Patient-Reported Diastolic 99      There is no height or weight on file to calculate BMI. Based upon direct observation of the patient, evaluation of cognition limited due to Medicare wellness performed via telephone encounter. Unable to perform physical exam due to Medicare annual wellness performed via telephone encounter.     Patient's complete Health Risk Assessment and screening values have been reviewed and are found in Flowsheets. The following problems were reviewed today and where indicated follow up appointments were made and/or referrals ordered. Positive Risk Factor Screenings with Interventions:     Fall Risk:  2 or more falls in past year?: (!) yes  Fall with injury in past year?: no  Fall Risk Interventions:    · Home safety tips provided            General Health and ACP:  General  In general, how would you say your health is?: Good  In the past 7 days, have you experienced any of the following? New or Increased Pain, New or Increased Fatigue, Loneliness, Social Isolation, Stress or Anger?: None of These  Do you get the social and emotional support that you need?: Yes  Do you have a Living Will?: Yes  Advance Directives     Power of [address] [place] [address] Will ACP-Advance Directive ACP-Power of     Not on File Not on File Not on File Not on File      General Health Risk Interventions:  · none     Health Habits/Nutrition:  Health Habits/Nutrition  Do you exercise for at least 20 minutes 2-3 times per week?: (!) No  Have you lost any weight without trying in the past 3 months?: No  Do you eat only one meal per day?: No  Have you seen the dentist within the past year?: N/A - wear dentures     Health Habits/Nutrition Interventions:  · Inadequate physical activity:  Patient reports that he is very active throughout the day working out in the yard depending on weather. Hearing/Vision:  No exam data present  Hearing/Vision  Do you or your family notice any trouble with your hearing that hasn't been managed with hearing aids?: (!) Yes  Do you have difficulty driving, watching TV, or doing any of your daily activities because of your eyesight?: No  Have you had an eye exam within the past year?: (!) No  Hearing/Vision Interventions:  · Hearing concerns:  Patient reports hearing aids in the past however they have broke.   Referral offered and declined. · Vision concerns:  patient encouraged to make appointment with his/her eye specialist        Personalized Preventive Plan   Current Health Maintenance Status  Immunization History   Administered Date(s) Administered    COVID-19, Moderna, Booster, PF, 50mcg/0.25ml 09/14/2021    COVID-19, Tayo Holto, Primary or Immunocompromised, PF, 100mcg/0.5mL 03/26/2021, 05/03/2021    Influenza Whole 10/26/2010    Influenza, High Dose (Fluzone 65 yrs and older) 10/10/2019, 09/01/2020, 10/05/2021    Influenza, Quadv, IM, (6 mo and older Fluzone, Flulaval, Fluarix and 3 yrs and older Afluria) 10/01/2016    Pneumococcal Conjugate 13-valent ([id number]) 01/05/2016    Pneumococcal Polysaccharide ([id number]) 11/01/2003, 08/20/2011, 03/10/2020    Tdap (Boostrix, Adacel) 04/09/1999        Health Maintenance   Topic Date Due    Hepatitis C screen  Never done    Shingles Vaccine (1 of 2) Never done    DTaP/Tdap/Td vaccine (2 - Td or Tdap) 04/09/2009    Annual Wellness Visit (AWV)  Never done    Depression Screen  12/02/2022    Lipid screen  12/07/2022    Potassium monitoring  12/07/2022    Creatinine monitoring  12/07/2022    Colon cancer screen colonoscopy  12/28/2026    Flu vaccine  Completed    Pneumococcal 65+ years Vaccine  Completed    COVID-19 Vaccine  Completed    AAA screen  Completed    Hepatitis A vaccine  Aged Out    Hepatitis B vaccine  Aged Out    Hib vaccine  Aged Out    Meningococcal (ACWY) vaccine  Aged Out     Recommendations for [employer] Due: see orders and patient instructions/AVS.  . Recommended screening schedule for the next 5-10 years is provided to the patient in written form: see Patient Sangeetha Chance was seen today for medicare awv.     Diagnoses and all orders for this visit:    Routine general medical examination at a health care facility           Theresa Kearney is a 74 y.o. male being evaluated by a Virtual Visit (phone) encounter to address concerns as mentioned above. A caregiver was present when appropriate. Due to this being a TeleHealth encounter (During WOCOB-74 public health emergency), evaluation of the following organ systems was limited: Vitals/Constitutional/EENT/Resp/CV/GI//MS/Neuro/Skin/Heme-Lymph-Imm. Pursuant to the emergency declaration under the [address] [place] [address], [address] [place] [address] authority and the [employer] Act, this Virtual Visit was conducted with patient's (and/or legal guardian's) consent, to reduce the patient's risk of exposure to COVID-19 and provide necessary medical care. The patient (and/or legal guardian) has also been advised to contact this office for worsening conditions or problems, and seek emergency medical treatment and/or call 911 if deemed necessary. Patient identification was verified at the start of the visit: Yes    Services were provided through phone to substitute for in-person clinic visit. Patient and provider were located at their individual homes. --JASEN Palafox CNP on 1/24/2022 at 9:39 AM    An electronic signature was used to authenticate this note.

## 2022-01-27 ENCOUNTER — TELEPHONE (OUTPATIENT)
Dept: FAMILY MEDICINE CLINIC | Age: 67
End: 2022-01-27

## 2022-01-27 NOTE — TELEPHONE ENCOUNTER
Wife calling about patient's blood pressure. Last visit on 1/13 you increased his lisinopril but he is still having high blood pressures. Last night it was 176/109 pulse 107 the night before that it was 171/105 pulse was 113. She said the lowest it has been is 151 and the highest was 189.   Do you want to adjust meds or have patient come back in for appt

## 2022-01-27 NOTE — TELEPHONE ENCOUNTER
Can we have him come in for a nurse appointment for blood pressure check here in the office. Make sure he is taking his medication prior to coming into the office. Please asked them when he is taking his medication?

## 2022-01-28 ENCOUNTER — NURSE ONLY (OUTPATIENT)
Dept: FAMILY MEDICINE CLINIC | Age: 67
End: 2022-01-28

## 2022-01-28 ENCOUNTER — HOSPITAL ENCOUNTER (EMERGENCY)
Age: 67
Discharge: HOME | End: 2022-01-28
Payer: MEDICARE

## 2022-01-28 ENCOUNTER — TELEPHONE (OUTPATIENT)
Dept: FAMILY MEDICINE CLINIC | Age: 67
End: 2022-01-28

## 2022-01-28 VITALS
SYSTOLIC BLOOD PRESSURE: 139 MMHG | DIASTOLIC BLOOD PRESSURE: 103 MMHG | OXYGEN SATURATION: 93 % | HEART RATE: 71 BPM | RESPIRATION RATE: 18 BRPM | TEMPERATURE: 98.4 F

## 2022-01-28 VITALS — SYSTOLIC BLOOD PRESSURE: 172 MMHG | DIASTOLIC BLOOD PRESSURE: 119 MMHG

## 2022-01-28 DIAGNOSIS — I10 HYPERTENSION, UNSPECIFIED TYPE: Primary | ICD-10-CM

## 2022-01-28 PROCEDURE — 99283 EMERGENCY DEPT VISIT LOW MDM: CPT

## 2022-01-28 ASSESSMENT — PAIN SCALES - GENERAL: PAINLEVEL_OUTOF10: 3

## 2022-01-28 ASSESSMENT — PAIN DESCRIPTION - LOCATION: LOCATION: HEAD

## 2022-01-28 NOTE — TELEPHONE ENCOUNTER
YEMI I called patient back today and encouraged him to go to er due to uncontrolled BP, his wife was on phone also and said they would go. Patient is going to ER per his wife.

## 2022-01-28 NOTE — TELEPHONE ENCOUNTER
Patient s wife stated they went to ER and his BP was  159/101 Abnormal  See er report    They did not do EKG or any other workup, told patient to lose weight and watch his diet and follow up with a cardiologist.  Patient feels like he wasted his money going there. They wanted you know they went. And thought we was using maybe the wrong size cuff on him maybe. I used the Large BP cuff when I took his BP.

## 2022-01-28 NOTE — TELEPHONE ENCOUNTER
Yes. They told them. He had GARCIA while he was there also. Wife said they will keep close eye on BP.

## 2022-01-28 NOTE — TELEPHONE ENCOUNTER
Did he mention or tell the dr/nurses at the hospital about the symptoms he was experiencing recently? Symptoms specifically that he mentioned to you during his blood pressure check today.

## 2022-01-28 NOTE — PROGRESS NOTES
Patient here for BP check after Med had been changed  And to compare monitors    Ours BP was 170/120    His 191/127    Ours 161/106    His 172/119 pulse was 73    I also told him monitors are close enough to be accurate    Patient also stated he has headaches and one day this week he had chest tightness, jaw pain and headache. I ask him why didn't go to ER those are signs of having a heartattack. He just didn't want to go to ER, so ask him if he told his wife or his daughter which is a DON, and he did not. He states he has been taking BP meds regular as directed.      At home readings are the same    Please advise

## 2022-01-28 NOTE — TELEPHONE ENCOUNTER
Patient here for BP check after Med had been changed  And to compare monitors    Ours BP was 170/120    His 191/127    Ours 161/106    His 172/119 pulse was 73    I also told him monitors are close enough to be accurate    Patient also stated he has headaches and one day this week he had chest tightness, jaw pain and headache. I ask him why didn't go to ER those are signs of having a heartattack. He just didn't want to go to ER, so ask him if he told his wife or his daughter which is a DON, and he did not. He states he has been taking BP meds regular as directed. At home readings are the same    Please advise      Media Information               Document Information    Clinical Consent:  External Medical Record   BP Readings   01/28/2022   Attached To:    Telephone on 1/28/22 with Priya Webber. , [facility]

## 2022-01-28 NOTE — TELEPHONE ENCOUNTER
Patient's blood pressure medication needs to be adjusted however I do feel that patient would benefit from going to the hospital for evaluation since he is reporting concerning symptoms as well as elevated blood pressure readings throughout this week during his nurse visit today. Recommendations to increase lisinopril to 20 mg at this time. Please send new Rx to pharmacy and notify patient of these changes.

## 2022-02-03 NOTE — ED PROVIDER NOTES
[address] ED  EMERGENCY DEPARTMENT ENCOUNTER        Pt Name: Hailey Oliver  MRN: [id number]  [place] 1955  Date of evaluation: 1/28/2022  Provider: LUCAS Ibrahim  PCP: JASEN Aldana CNP    This patient was not seen and evaluated by the attending physician No att. providers found. I have evaluated this patient. My supervising physician was available for consultation. CHIEF COMPLAINT       Chief Complaint   Patient presents with    Hypertension     pt saw pcp today, pt's PCP told him to go the ED for his BP level        HISTORY OF PRESENT ILLNESS   (Location/Symptom, Timing/Onset, Context/Setting, Quality, Duration, Modifying Factors, Severity)  Note limiting factors. Hailey Oliver is a 74 y.o. male with past medical history of anxiety, Hypertension, hyperlipidemia, obesity, NICOLE on BiPAP who presents via private vehicle from his home with his wife for evaluation of elevated blood pressures. Patient presents today noting that he has been having elevated blood pressures over the last several days. He called his family doctor as he was told to keep an eye on his blood pressures, they recently increased his blood pressure dose. He notes that he called his family doctor today and when they were asking him about symptoms I asked about chest pain headache vision changes shortness of breath and he noted that about a week ago he did have intermittent pain to the left side of the chest that was not associated with shortness of breath diaphoresis radiation of the pain or weakness. He notes it went away on its own. He notes that he has had this in the past.  He has not had it since it happened a week ago. Triage nurse instructed him to go to the ER for chest pain.   Patient presents today noting that he is not having any chest pain not having any shortness of breath currently does not have a headache or vision change although he did feel like he had a mild headache while checking in. Nursing Notes were all reviewed and agreed with or any disagreements were addressed  in the HPI. Pt was seen during the [place] 19 pandemic. Appropriate PPE worn by ME during patient encounters. Pt seen during a time with constrained hospital bed capacity and other potential inpatient and outpatient resources were constrained due to the viral pandemic. REVIEW OF SYSTEMS    (2-9 systems for level 4, 10 or more for level 5)     Review of Systems    Positives and Pertinent negatives as per HPI. Except as noted abovein the ROS, all other systems were reviewed and negative.        PAST MEDICAL HISTORY     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     severe    Hearing loss     decreased hearing left ear    Hyperlipidemia     Hypertension     pos GXT 3/01, neg GXT 3/07    Narcolepsy     Osteoarthritis     Sleep apnea     bipap    Superficial peroneal nerve neuropathy     bilateral         SURGICAL HISTORY     Past Surgical History:   Procedure Laterality Date    APPENDECTOMY      CHOLECYSTECTOMY      COLONOSCOPY  12/19/11    KNEE SURGERY Right 04/22/2016    Rt total knee    OTHER SURGICAL HISTORY Right 09/01/2016    RIGHT TOTAL SHOULDER REPLACEMENT          SHOULDER ARTHROPLASTY  6/19/2012    Lt total shoulder arthroplasty    TOTAL KNEE ARTHROPLASTY Left 07/19/2016         CURRENTMEDICATIONS       Discharge Medication List as of 1/28/2022  1:57 PM      CONTINUE these medications which have NOT CHANGED    Details   lisinopril (PRINIVIL;ZESTRIL) 10 MG tablet Take 1 tablet by mouth daily, Disp-30 tablet, R-1Normal      DULoxetine (CYMBALTA) 60 MG extended release capsule Take 1 capsule by mouth daily, Disp-90 capsule, R-1Normal      sildenafil (VIAGRA) 50 MG tablet Take 50 mg by mouth as needed for Erectile DysfunctionHistorical Med      tamsulosin (FLOMAX) 0.4 MG capsule TAKE ONE CAPSULE BY MOUTH DAILY, Disp-90 capsule, R-1Normal      atenolol (TENORMIN) 100 MG tablet TAKE 1 TABLET EVERY DAY, Disp-90 tablet, R-1Normal      atorvastatin (LIPITOR) 20 MG tablet Take 1 tablet by mouth daily, Disp-30 tablet, R-3Normal      sulindac (CLINORIL) 200 MG tablet TAKE ONE TABLET BY MOUTH TWICE A DAY, Disp-60 tablet, R-4Normal      Multiple Vitamin (THERA) TABS Take 1 tablet by mouth daily. SAW PALMETTO Take  by mouth daily. Handicap Placard MISC Starting Thu 2021, Disp-1 each, R-0, Print      gabapentin (NEURONTIN) 300 MG capsule TAKE 2 CAPSULES THREE TIMES DAILY, Disp-540 capsule, R-0Normal               ALLERGIES     Celebrex [celecoxib], Elavil [amitriptyline hcl], and Topamax    FAMILYHISTORY       Family History   Problem Relation Age of Onset    Cancer Mother         breast    High Blood Pressure Mother     Arthritis Father     Depression Father     Hearing Loss Father     Heart Disease Father     Stroke Father     Vision Loss Father           SOCIAL HISTORY       Social History     Socioeconomic History    Marital status:      Spouse name: None    Number of children: None    Years of education: None    Highest education level: None   Occupational History    None   Tobacco Use    Smoking status: Former Smoker     Packs/day: 3.00     Years: 32.00     Pack years: 96.00     Quit date: 1999     Years since quittin.0    Smokeless tobacco: Never Used   Substance and Sexual Activity    Alcohol use: No    Drug use: No    Sexual activity: None   Other Topics Concern    None   Social History Narrative    None     Social Determinants of Health     Financial Resource Strain:     Difficulty of Paying Living Expenses: Not on file   Food Insecurity:     Worried About Running Out of Food in the Last Year: Not on file    Marj of Food in the Last Year: Not on file   Transportation Needs:     Lack of Transportation (Medical):  Not on file    Lack of Transportation (Non-Medical): Not on file   Physical Activity:     Days of Exercise per Week: Not on file    Minutes of Exercise per Session: Not on file   Stress:     Feeling of Stress : Not on file   Social Connections:     Frequency of Communication with Friends and Family: Not on file    Frequency of Social Gatherings with Friends and Family: Not on file    Attends Rastafarian Services: Not on file    Active Member of [address] [place] [address] [employer] or Organizations: Not on file    Attends Club or Organization Meetings: Not on file    Marital Status: Not on file   Intimate Partner Violence:     Fear of Current or Ex-Partner: Not on file    Emotionally Abused: Not on file    Physically Abused: Not on file    Sexually Abused: Not on file   Housing Stability:     Unable to Pay for Housing in the Last Year: Not on file    Number of [place] in the Last Year: Not on file    Unstable Housing in the Last Year: Not on file       SCREENINGS             PHYSICAL EXAM    (up to 7 for level 4, 8 or more for level 5)     ED Triage Vitals [01/28/22 1212]   BP Temp Temp Source Pulse Resp SpO2 Height Weight   (!) 159/101 98.4 °F (36.9 °C) Oral 77 16 95 % -- --       Physical Exam  PHYSICAL EXAM  BP (!) 139/103   Pulse 71   Temp 98.4 °F (36.9 °C) (Oral)   Resp 18   SpO2 93%   GENERAL APPEARANCE: Awake and alert. Cooperative. Well-appearing adult male sitting upright in exam chair nondiaphoretic breathing comfortably on room air showing no sign of acute respiratory distress. Seen and evaluated in room 13. HEAD: Normocephalic. Atraumatic. EYES: PERRL. EOM's grossly intact. No nystagmus. No photophobia. ENT: Mucous membranes are moist.  Oropharynx nonerythematous nonedematous uvula midline. Nixon Jamie NECK: Supple. No JVD. HEART: RRR. No murmurs. Radial pulses 2+ symmetric, PT pulses 2+ symmetric  LUNGS: Respirations unlabored. CTAB. Good air exchange. Speaking comfortably in full sentences. ABDOMEN: Soft. Non-distended. Non-tender. No masses. No organomegaly. No guarding or rebound. EXTREMITIES: No peripheral edema. Moves all extremities equally. All extremities neurovascularly intact. SKIN: Warm and dry. No acute rashes. NEUROLOGICAL: Alert and oriented. CN 2-9, 11, 12 intact. Normal finger-nose-finger. No pronator drift. No gross facial drooping. Power intact to UE and LE, sensation intact x 4. No tremors or ataxia. Gait intact. PSYCHIATRIC: Normal mood and affect. DIAGNOSTIC RESULTS   LABS:    Labs Reviewed - No data to display    All other labs were within normal range or not returned as of this dictation. EKG: All EKG's are interpreted by the Emergency Department Physician who either signs orCo-signs this chart in the absence of a cardiologist.  Please see their note for interpretation of EKG. RADIOLOGY:   Non-plain film images such as CT, Ultrasound and MRI are read by the radiologist. Plain radiographic images are visualized andpreliminarily interpreted by the  ED Provider with the below findings:        Interpretation perthe Radiologist below, if available at the time of this note:    No orders to display     No results found.        PROCEDURES   Unless otherwise noted below, none     Procedures    CRITICAL CARE TIME   N/A    CONSULTS:  None      EMERGENCY DEPARTMENT COURSE and DIFFERENTIALDIAGNOSIS/MDM:   Vitals:    Vitals:    01/28/22 1301 01/28/22 1313 01/28/22 1330 01/28/22 1400   BP: (!) 154/92  (!) 135/91 (!) 139/103   Pulse:    71   Resp:    18   Temp:       TempSrc:       SpO2: 93% 94% 93% 93%       Patient was given thefollowing medications:  Medications - No data to display    PDMP Monitoring:    Last PDMP Ángel as Reviewed [facility]):  Review User Review Instant Review Result   Negro Ngo 8/12/2019  8:28 AM Reviewed PDMP [1]     Last Controlled Substance Monitoring Documentation      Refill from 8/12/2019 in [address]   Periodic Controlled Substance Monitoring No signs of potential drug abuse or diversion identified. filed at 08/12/2019 2154        Urine Drug Screenings (1 yr)    No resulted procedures found. Medication Contract and Consent for Opioid Use Documents Filed     Patient Documents     Type of Document Status Date Received Received By Description    Medication Contract [Status Missing]  WATSEVEPEDRO YIFAN Medication Agreement 2/25/12    Medication Contract Received 1/17/2018  4:19 PM DAVIS Dexter SHEFFIELD [address] Narcotic log 01/17/2018                MDM:   Patient seen and evaluated. Old records reviewed. Diagnostic testing reviewed and results discussed. I have independently evaluated this patient based upon my scope of practice. Supervising physician was in the department for consultation as needed. 77-year-old male presents for evaluation of elevated blood pressures. Patient does present mildly hypertensive however he is not having any other acute signs or symptoms of hypertensive emergency. His physical exam is normal.  Patient was offered evaluation with routine blood work however he declined noting that he currently feels fine and presents solely because his family doctor's office told him to. He denies any active chest pain. Patient does have some elevated risk factors from a cardiovascular standpoint and I do believe it would be prudent to refer him to a cardiologist for repeat stress test as he notes that his last stress test was without 20 years ago. Additionally we discussed diet and exercise modifications for blood pressure control and taking a blood pressure log at home. I want him to see his family doctor for repeat evaluation in the next several days. Strict ER return precautions advised including signs and symptoms of hypertensive emergency. Pt is in agreement with the current plan and all questions were addressed.        Discharge Time out:  CC Reviewed Yes   Test Results Yes     Vitals:    01/28/22 1400   BP: (!) 139/103   Pulse: 71   Resp: 18   Temp:    SpO2: 93%              FINAL IMPRESSION      1. Hypertension, unspecified type    2.  BMI 45.0-49.9, adult [facility])          DISPOSITION/PLAN   DISPOSITION Decision To Discharge 01/28/2022 01:27:39 PM      PATIENT REFERREDTO:  Roseanna Blank MD  [address], [address] [facility]  [phone]    Schedule an appointment as soon as possible for a visit       Kylie Monroy, APRN - [address] [address]  [phone]    Schedule an appointment as soon as possible for a visit   For a recheck in  1 week    [place] [facility]  [address] [address]  [phone]  Go to   If symptoms worsen      DISCHARGE MEDICATIONS:  Discharge Medication List as of 1/28/2022  1:57 PM          DISCONTINUED MEDICATIONS:  Discharge Medication List as of 1/28/2022  1:57 PM                 (Please note that portions ofthis note were completed with a voice recognition program.  Efforts were made to edit the dictations but occasionally words are mis-transcribed.)    Giselle Flower (electronically signed)       Giselle Flower  02/03/22 1027

## 2022-02-07 DIAGNOSIS — I10 ESSENTIAL HYPERTENSION: ICD-10-CM

## 2022-02-07 RX ORDER — LISINOPRIL 20 MG/1
20 TABLET ORAL DAILY
Qty: 90 TABLET | Refills: 1 | Status: SHIPPED | OUTPATIENT
Start: 2022-02-07 | End: 2022-02-15

## 2022-02-07 NOTE — TELEPHONE ENCOUNTER
See note from 1/28 Lisinopril was to be increased to 20 mg.   New script sent to mail order pharmacy

## 2022-02-10 DIAGNOSIS — R35.0 FREQUENT URINATION: ICD-10-CM

## 2022-02-10 RX ORDER — TAMSULOSIN HYDROCHLORIDE 0.4 MG/1
0.4 CAPSULE ORAL 2 TIMES DAILY
Qty: 180 CAPSULE | Refills: 1 | Status: SHIPPED | OUTPATIENT
Start: 2022-02-10 | End: 2022-06-07 | Stop reason: DRUGHIGH

## 2022-02-15 ENCOUNTER — OFFICE VISIT (OUTPATIENT)
Dept: FAMILY MEDICINE CLINIC | Age: 67
End: 2022-02-15
Payer: MEDICARE

## 2022-02-15 VITALS
SYSTOLIC BLOOD PRESSURE: 174 MMHG | HEIGHT: 70 IN | OXYGEN SATURATION: 97 % | WEIGHT: 315 LBS | HEART RATE: 56 BPM | DIASTOLIC BLOOD PRESSURE: 112 MMHG | RESPIRATION RATE: 16 BRPM | BODY MASS INDEX: 45.1 KG/M2

## 2022-02-15 DIAGNOSIS — I10 ESSENTIAL HYPERTENSION: Primary | ICD-10-CM

## 2022-02-15 PROCEDURE — 3017F COLORECTAL CA SCREEN DOC REV: CPT | Performed by: NURSE PRACTITIONER

## 2022-02-15 PROCEDURE — G8482 FLU IMMUNIZE ORDER/ADMIN: HCPCS | Performed by: NURSE PRACTITIONER

## 2022-02-15 PROCEDURE — 1123F ACP DISCUSS/DSCN MKR DOCD: CPT | Performed by: NURSE PRACTITIONER

## 2022-02-15 PROCEDURE — G8427 DOCREV CUR MEDS BY ELIG CLIN: HCPCS | Performed by: NURSE PRACTITIONER

## 2022-02-15 PROCEDURE — 1036F TOBACCO NON-USER: CPT | Performed by: NURSE PRACTITIONER

## 2022-02-15 PROCEDURE — G8417 CALC BMI ABV UP PARAM F/U: HCPCS | Performed by: NURSE PRACTITIONER

## 2022-02-15 PROCEDURE — 4040F PNEUMOC VAC/ADMIN/RCVD: CPT | Performed by: NURSE PRACTITIONER

## 2022-02-15 PROCEDURE — 99213 OFFICE O/P EST LOW 20 MIN: CPT | Performed by: NURSE PRACTITIONER

## 2022-02-15 RX ORDER — LOSARTAN POTASSIUM 25 MG/1
25 TABLET ORAL DAILY
Qty: 30 TABLET | Refills: 1 | Status: SHIPPED | OUTPATIENT
Start: 2022-02-15 | End: 2022-03-15 | Stop reason: DRUGHIGH

## 2022-02-15 ASSESSMENT — ENCOUNTER SYMPTOMS
EYES NEGATIVE: 1
GASTROINTESTINAL NEGATIVE: 1
RESPIRATORY NEGATIVE: 1

## 2022-02-15 NOTE — PROGRESS NOTES
CHIEF COMPLAINT  Chief Complaint   Patient presents with    1 Month Follow-Up     FASTING    Hypertension        HPI   Raphael Dhillon is a 69 y.o. male who presents to the office for blood pressure management. Patient was recently seen for uncontrolled hypertension. Patient previously on lisinopril 10 mg which was adjusted to 20 mg daily approximately 1 week ago. Patient continues to have blood pressure 150-180 over 90s to 100s. Patient asymptomatic of elevated blood pressure readings. No recent episodes of dizziness, headedness, chest pain or shortness of breath. No abdominal pain or discomfort. Patient reports that he did go to the ER recently for elevated blood pressure but reports \"they did not do anything. \"  No other complaints, modifying factors or associated symptoms. Nursing notes reviewed.    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     severe    Hearing loss     decreased hearing left ear    Hyperlipidemia     Hypertension     pos GXT 3/01, neg GXT 3/07    Narcolepsy     Osteoarthritis     Sleep apnea     bipap    Superficial peroneal nerve neuropathy     bilateral     Past Surgical History:   Procedure Laterality Date    APPENDECTOMY      CHOLECYSTECTOMY      COLONOSCOPY  12/19/11    KNEE SURGERY Right 04/22/2016    Rt total knee    OTHER SURGICAL HISTORY Right 09/01/2016    RIGHT TOTAL SHOULDER REPLACEMENT          SHOULDER ARTHROPLASTY  6/19/2012    Lt total shoulder arthroplasty    TOTAL KNEE ARTHROPLASTY Left 07/19/2016     Family History   Problem Relation Age of Onset    Cancer Mother         breast    High Blood Pressure Mother     Arthritis Father     Depression Father     Hearing Loss Father     Heart Disease Father     Stroke Father     Vision Loss Father      Social History     Socioeconomic History    Marital status:      Spouse name: Not on file    Number of children: Not on file    Years of education: Not on file    Highest education level: Not on file   Occupational History    Not on file   Tobacco Use    Smoking status: Former Smoker     Packs/day: 3.00     Years: 32.00     Pack years: 96.00     Quit date: 1999     Years since quittin.1    Smokeless tobacco: Never Used   Substance and Sexual Activity    Alcohol use: No    Drug use: No    Sexual activity: Not on file   Other Topics Concern    Not on file   Social History Narrative    Not on file     Social Determinants of Health     Financial Resource Strain:     Difficulty of Paying Living Expenses: Not on file   Food Insecurity:     Worried About Running Out of Food in the Last Year: Not on file    Marj of Food in the Last Year: Not on file   Transportation Needs:     Lack of Transportation (Medical): Not on file    Lack of Transportation (Non-Medical):  Not on file   Physical Activity:     Days of Exercise per Week: Not on file    Minutes of Exercise per Session: Not on file   Stress:     Feeling of Stress : Not on file   Social Connections:     Frequency of Communication with Friends and Family: Not on file    Frequency of Social Gatherings with Friends and Family: Not on file    Attends Confucianism Services: Not on file    Active Member of [address] [place] [address] [employer] or Organizations: Not on file    Attends Club or Organization Meetings: Not on file    Marital Status: Not on file   Intimate Partner Violence:     Fear of Current or Ex-Partner: Not on file    Emotionally Abused: Not on file    Physically Abused: Not on file    Sexually Abused: Not on file   Housing Stability:     Unable to Pay for Housing in the Last Year: Not on file    Number of [place] in the Last Year: Not on file    Unstable Housing in the Last Year: Not on file     Current Outpatient Medications   Medication Sig Dispense Refill    losartan (COZAAR) 25 MG tablet Take 1 tablet by mouth daily 30 tablet 1    tamsulosin (FLOMAX) 0.4 MG capsule Take 1 capsule by mouth 2 times daily 180 capsule 1    DULoxetine (CYMBALTA) 60 MG extended release capsule Take 1 capsule by mouth daily 90 capsule 1    sildenafil (VIAGRA) 50 MG tablet Take 50 mg by mouth as needed for Erectile Dysfunction      Handicap Placard MISC by Does not apply route 1 each 0    atenolol (TENORMIN) 100 MG tablet TAKE 1 TABLET EVERY DAY 90 tablet 1    atorvastatin (LIPITOR) 20 MG tablet Take 1 tablet by mouth daily 30 tablet 3    sulindac (CLINORIL) 200 MG tablet TAKE ONE TABLET BY MOUTH TWICE A DAY (Patient taking differently: Pt is taking 1 time daily) 60 tablet 4    Multiple Vitamin (THERA) TABS Take 1 tablet by mouth daily.  SAW PALMETTO Take  by mouth daily.  gabapentin (NEURONTIN) 300 MG capsule TAKE 2 CAPSULES THREE TIMES DAILY 540 capsule 0     No current facility-administered medications for this visit. Allergies   Allergen Reactions    Celebrex [Celecoxib]      Nosebleeds    Elavil [Amitriptyline Hcl]      Nose bleed    Topamax      Leg cramps       REVIEW OF SYSTEMS  Review of Systems   Constitutional: Negative. HENT: Negative. Eyes: Negative. Respiratory: Negative. Cardiovascular: Negative. Gastrointestinal: Negative. Genitourinary: Negative. Musculoskeletal: Negative. Skin: Negative. Neurological: Negative. Psychiatric/Behavioral: Negative. PHYSICAL EXAM  BP (!) 174/112 (Site: Left Upper Arm, Position: Sitting, Cuff Size: Large Adult)   Pulse 56   Resp 16   Ht 5' 10\" (1.778 m)   Wt (!) 332 lb 6.4 oz (150.8 kg)   SpO2 97%   BMI 47.69 kg/m²   Physical Exam  Constitutional:       Appearance: Normal appearance. He is obese. He is not toxic-appearing. HENT:      Head: Normocephalic. Right Ear: External ear normal.      Left Ear: External ear normal.      Nose: Nose normal.      Mouth/Throat:      Mouth: Mucous membranes are moist.      Pharynx: Oropharynx is clear.    Eyes:      Extraocular Movements: Extraocular movements intact. Conjunctiva/sclera: Conjunctivae normal.      Pupils: Pupils are equal, round, and reactive to light. Cardiovascular:      Rate and Rhythm: Normal rate. Pulses: Normal pulses. Heart sounds: Normal heart sounds. Pulmonary:      Effort: Pulmonary effort is normal.      Breath sounds: Normal breath sounds. Abdominal:      Tenderness: There is no guarding. Musculoskeletal:         General: Normal range of motion. Cervical back: Normal range of motion. Skin:     General: Skin is warm and dry. Capillary Refill: Capillary refill takes 2 to 3 seconds. Neurological:      Mental Status: He is alert and oriented to person, place, and time. ASSESSMENT/PLAN:   1. Essential hypertension  Patient presents today for reevaluation of uncontrolled hypertension. Patient was previously on lisinopril with multiple dose adjustments most recently patient blood pressure medication was lisinopril 20 mg daily however patient reports that his blood pressure continues to be elevated. Patient currently asymptomatic of elevated blood pressure readings. On average blood pressure 150-180 over 90s to 100s. Patient educated on the importance of exercise, weight loss and dietary compliance to help with his blood pressure. We did discuss increasing lisinopril however patient reports that \"after while his body becomes immune to certain medications. \"  I did recommend that he discontinue lisinopril at this time and start on losartan 25 mg daily. Patient will follow up in 1 month, sooner for new or worsening symptoms. Patient encouraged to continue checking blood pressure on a daily basis and call the office with abnormal readings or if it becomes symptomatic of abnormal readings. - losartan (COZAAR) 25 MG tablet; Take 1 tablet by mouth daily  Dispense: 30 tablet; Refill: 1         The note was completed using Dragon voice recognition transcription.  Every effort was made to ensure accuracy; however, inadvertent  transcription errors may be present despite my best efforts to edit errors.     Lee Billings, APRN - CNP

## 2022-02-24 RX ORDER — ATORVASTATIN CALCIUM 20 MG/1
20 TABLET, FILM COATED ORAL DAILY
Qty: 90 TABLET | Refills: 1 | Status: SHIPPED | OUTPATIENT
Start: 2022-02-24 | End: 2022-08-05

## 2022-03-11 DIAGNOSIS — I10 ESSENTIAL HYPERTENSION: ICD-10-CM

## 2022-03-11 DIAGNOSIS — G57.93 NEUROPATHY INVOLVING BOTH LOWER EXTREMITIES: ICD-10-CM

## 2022-03-11 RX ORDER — GABAPENTIN 300 MG/1
CAPSULE ORAL
Qty: 540 CAPSULE | Refills: 0 | Status: SHIPPED | OUTPATIENT
Start: 2022-03-11 | End: 2022-05-19

## 2022-03-11 RX ORDER — ATENOLOL 100 MG/1
TABLET ORAL
Qty: 90 TABLET | Refills: 1 | Status: SHIPPED | OUTPATIENT
Start: 2022-03-11 | End: 2022-08-05

## 2022-03-11 NOTE — TELEPHONE ENCOUNTER
Date of last refill of this med was 12-3-20, # of pills given 540 and # of refills given 0. Their next appointment is 3-15-22, the last date patient was seen was 2-15-22. Does patient have medication agreement on file? Yes  Has drug screen been done in last 12 months if needed?  no

## 2022-03-14 NOTE — PROGRESS NOTES
CHIEF COMPLAINT  Chief Complaint   Patient presents with    Hypertension     1 month follow up        HPI   Cornelious Mass is a 69 y.o. male who presents to the office 1 month follow-up regarding blood pressure management. Patient recently placed on losartan 25 mg, patient here today to follow-up. Patient reports checking blood pressure at home ans continues to be [id number]. No unusual fatigue or unexplained weight loss. No chest pain or shortness of breath. No abdominal pain or discomfort. Patient reports take medications as prescribed with no adverse side effects. Patient reports that he was having some difficulty with his insurance and coverage with the medication initially needing a prior authorization. No other complaints, modifying factors or associated symptoms. Nursing notes reviewed.    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     severe    Hearing loss     decreased hearing left ear    Hyperlipidemia     Hypertension     pos GXT 3/01, neg GXT 3/07    Narcolepsy     Osteoarthritis     Sleep apnea     bipap    Superficial peroneal nerve neuropathy     bilateral     Past Surgical History:   Procedure Laterality Date    APPENDECTOMY      CHOLECYSTECTOMY      COLONOSCOPY  12/19/11    KNEE SURGERY Right 04/22/2016    Rt total knee    OTHER SURGICAL HISTORY Right 09/01/2016    RIGHT TOTAL SHOULDER REPLACEMENT          SHOULDER ARTHROPLASTY  6/19/2012    Lt total shoulder arthroplasty    TOTAL KNEE ARTHROPLASTY Left 07/19/2016     Family History   Problem Relation Age of Onset    Cancer Mother         breast    High Blood Pressure Mother     Arthritis Father     Depression Father     Hearing Loss Father     Heart Disease Father     Stroke Father     Vision Loss Father      Social History     Socioeconomic History    Marital status:      Spouse name: Not on file    Number of children: Not MG capsule TAKE 2 CAPSULES THREE TIMES DAILY 540 capsule 0    atenolol (TENORMIN) 100 MG tablet TAKE 1 TABLET EVERY DAY 90 tablet 1    atorvastatin (LIPITOR) 20 MG tablet Take 1 tablet by mouth daily 90 tablet 1    tamsulosin (FLOMAX) 0.4 MG capsule Take 1 capsule by mouth 2 times daily 180 capsule 1    DULoxetine (CYMBALTA) 60 MG extended release capsule Take 1 capsule by mouth daily 90 capsule 1    sildenafil (VIAGRA) 50 MG tablet Take 50 mg by mouth as needed for Erectile Dysfunction      Handicap Placard MISC by Does not apply route 1 each 0    sulindac (CLINORIL) 200 MG tablet TAKE ONE TABLET BY MOUTH TWICE A DAY (Patient taking differently: Pt is taking 1 time daily) 60 tablet 4    Multiple Vitamin (THERA) TABS Take 1 tablet by mouth daily.  SAW PALMETTO Take  by mouth daily. No current facility-administered medications for this visit. Allergies   Allergen Reactions    Celebrex [Celecoxib]      Nosebleeds    Elavil [Amitriptyline Hcl]      Nose bleed    Topamax      Leg cramps       REVIEW OF SYSTEMS  Review of Systems   Constitutional: Negative. HENT: Negative. Eyes: Negative. Respiratory: Negative. Cardiovascular: Negative. Gastrointestinal: Negative. Genitourinary: Negative. Musculoskeletal: Negative. Skin: Negative. Neurological: Negative. Psychiatric/Behavioral: Negative. PHYSICAL EXAM  BP (!) 185/101   Pulse 60   Temp 98.3 °F (36.8 °C) (Oral)   Wt (!) 332 lb (150.6 kg)   SpO2 95%   BMI 47.64 kg/m²   Physical Exam  Constitutional:       Appearance: Normal appearance. He is obese. He is not toxic-appearing. HENT:      Head: Normocephalic. Right Ear: External ear normal.      Left Ear: External ear normal.      Nose: Nose normal.      Mouth/Throat:      Mouth: Mucous membranes are moist.      Pharynx: Oropharynx is clear. Eyes:      Extraocular Movements: Extraocular movements intact.       Conjunctiva/sclera: Conjunctivae normal.      Pupils: Pupils are equal, round, and reactive to light. Cardiovascular:      Rate and Rhythm: Normal rate. Pulses: Normal pulses. Heart sounds: Normal heart sounds. Pulmonary:      Effort: Pulmonary effort is normal.      Breath sounds: Normal breath sounds. Abdominal:      Tenderness: There is no guarding. Musculoskeletal:         General: Normal range of motion. Cervical back: Normal range of motion. Skin:     General: Skin is warm and dry. Capillary Refill: Capillary refill takes 2 to 3 seconds. Neurological:      Mental Status: He is alert and oriented to person, place, and time. ASSESSMENT/PLAN:   1. Essential hypertension  Stable versus uncontrolled. Patient continued to have elevated blood pressure readings. Patient reports that he has been checking his blood pressure daily and continues to have elevated readings at home. On average blood pressure ranges 150s to 180s over 80s to 100s. Patient denies any dizziness, lightheadedness, chest pain. Patient denies any overt side effects of the medication. Patient reports he did have some difficulty with his insurance and prior authorization initially. I did recommend that if insurance prefers a different medication that he notify our office. Patient will be increased to losartan 50 mg daily and will follow up with his blood pressure log in 2 weeks, sooner for new or worsening symptoms. Patient verbalized and acknowledges a plan of care at this time. - losartan (COZAAR) 50 MG tablet; Take 1 tablet by mouth daily  Dispense: 30 tablet; Refill: 3         The note was completed using Dragon voice recognition transcription. Every effort was made to ensure accuracy; however, inadvertent  transcription errors may be present despite my best efforts to edit errors.     JASEN Cross - CNP

## 2022-03-15 ENCOUNTER — OFFICE VISIT (OUTPATIENT)
Dept: FAMILY MEDICINE CLINIC | Age: 67
End: 2022-03-15
Payer: MEDICARE

## 2022-03-15 VITALS
SYSTOLIC BLOOD PRESSURE: 185 MMHG | DIASTOLIC BLOOD PRESSURE: 101 MMHG | OXYGEN SATURATION: 95 % | WEIGHT: 315 LBS | HEART RATE: 60 BPM | TEMPERATURE: 98.3 F | BODY MASS INDEX: 47.64 KG/M2

## 2022-03-15 DIAGNOSIS — I10 ESSENTIAL HYPERTENSION: Primary | ICD-10-CM

## 2022-03-15 PROCEDURE — G8482 FLU IMMUNIZE ORDER/ADMIN: HCPCS | Performed by: NURSE PRACTITIONER

## 2022-03-15 PROCEDURE — 99213 OFFICE O/P EST LOW 20 MIN: CPT | Performed by: NURSE PRACTITIONER

## 2022-03-15 PROCEDURE — G8417 CALC BMI ABV UP PARAM F/U: HCPCS | Performed by: NURSE PRACTITIONER

## 2022-03-15 PROCEDURE — G8427 DOCREV CUR MEDS BY ELIG CLIN: HCPCS | Performed by: NURSE PRACTITIONER

## 2022-03-15 PROCEDURE — 1036F TOBACCO NON-USER: CPT | Performed by: NURSE PRACTITIONER

## 2022-03-15 PROCEDURE — 4040F PNEUMOC VAC/ADMIN/RCVD: CPT | Performed by: NURSE PRACTITIONER

## 2022-03-15 PROCEDURE — 1123F ACP DISCUSS/DSCN MKR DOCD: CPT | Performed by: NURSE PRACTITIONER

## 2022-03-15 PROCEDURE — 3017F COLORECTAL CA SCREEN DOC REV: CPT | Performed by: NURSE PRACTITIONER

## 2022-03-15 RX ORDER — LOSARTAN POTASSIUM 50 MG/1
50 TABLET ORAL DAILY
Qty: 30 TABLET | Refills: 3 | Status: SHIPPED | OUTPATIENT
Start: 2022-03-15 | End: 2022-03-29 | Stop reason: DRUGHIGH

## 2022-03-15 ASSESSMENT — ENCOUNTER SYMPTOMS
RESPIRATORY NEGATIVE: 1
GASTROINTESTINAL NEGATIVE: 1
EYES NEGATIVE: 1

## 2022-03-29 ENCOUNTER — TELEPHONE (OUTPATIENT)
Dept: FAMILY MEDICINE CLINIC | Age: 67
End: 2022-03-29

## 2022-03-29 RX ORDER — LOSARTAN POTASSIUM 100 MG/1
100 TABLET ORAL DAILY
Qty: 90 TABLET | Refills: 0 | Status: SHIPPED | OUTPATIENT
Start: 2022-03-29 | End: 2022-05-19

## 2022-04-01 DIAGNOSIS — M19.90 ARTHRITIS: ICD-10-CM

## 2022-04-01 RX ORDER — SULINDAC 200 MG/1
200 TABLET ORAL DAILY
Qty: 90 TABLET | Refills: 1 | Status: SHIPPED | OUTPATIENT
Start: 2022-04-01 | End: 2022-09-08

## 2022-04-06 ENCOUNTER — TELEPHONE (OUTPATIENT)
Dept: FAMILY MEDICINE CLINIC | Age: 67
End: 2022-04-06

## 2022-04-06 NOTE — TELEPHONE ENCOUNTER
Patient dropped off a copy of his current blood pressure readings please see the attachment.   Also, patient started a low carb diet on 3/26/22 and has lost 13 lbs

## 2022-05-09 ENCOUNTER — TELEPHONE (OUTPATIENT)
Dept: FAMILY MEDICINE CLINIC | Age: 67
End: 2022-05-09

## 2022-05-10 RX ORDER — HYDROCHLOROTHIAZIDE 12.5 MG/1
12.5 CAPSULE, GELATIN COATED ORAL DAILY
Qty: 90 CAPSULE | Refills: 1 | Status: SHIPPED | OUTPATIENT
Start: 2022-05-10 | End: 2022-09-08

## 2022-05-10 NOTE — TELEPHONE ENCOUNTER
Readings appeared to be elevated consistently. Would recommend patient taking hydrochlorothiazide 12.5 mg daily in conjunction with current medications. Please send Rx to pharmacy. Patient to follow-up in 1 month.

## 2022-05-19 DIAGNOSIS — G57.93 NEUROPATHY INVOLVING BOTH LOWER EXTREMITIES: ICD-10-CM

## 2022-05-19 RX ORDER — GABAPENTIN 300 MG/1
CAPSULE ORAL
Qty: 540 CAPSULE | Refills: 0 | Status: SHIPPED | OUTPATIENT
Start: 2022-05-19 | End: 2022-08-19

## 2022-05-19 RX ORDER — DULOXETIN HYDROCHLORIDE 60 MG/1
CAPSULE, DELAYED RELEASE ORAL
Qty: 90 CAPSULE | Refills: 1 | Status: SHIPPED | OUTPATIENT
Start: 2022-05-19

## 2022-05-19 RX ORDER — LOSARTAN POTASSIUM 100 MG/1
TABLET ORAL
Qty: 90 TABLET | Refills: 1 | Status: SHIPPED | OUTPATIENT
Start: 2022-05-19

## 2022-05-19 NOTE — TELEPHONE ENCOUNTER
Date of last refill of this med was 3/11/22, # of pills given 540 and # of refills given 0. Their next appointment is 6/7/2022 , the last date patient was seen was 3/15/22 . Does patient have medication agreement on file? Yes  Has drug screen been done in last 12 months if needed?  no

## 2022-06-07 ENCOUNTER — OFFICE VISIT (OUTPATIENT)
Dept: FAMILY MEDICINE CLINIC | Age: 67
End: 2022-06-07
Payer: MEDICARE

## 2022-06-07 VITALS
DIASTOLIC BLOOD PRESSURE: 81 MMHG | TEMPERATURE: 98.1 F | OXYGEN SATURATION: 93 % | SYSTOLIC BLOOD PRESSURE: 131 MMHG | WEIGHT: 315 LBS | HEART RATE: 69 BPM | BODY MASS INDEX: 46.69 KG/M2

## 2022-06-07 DIAGNOSIS — E55.9 VITAMIN D DEFICIENCY: ICD-10-CM

## 2022-06-07 DIAGNOSIS — J45.20 MILD INTERMITTENT ASTHMA WITHOUT COMPLICATION: ICD-10-CM

## 2022-06-07 DIAGNOSIS — G47.33 OSA TREATED WITH BIPAP: ICD-10-CM

## 2022-06-07 DIAGNOSIS — G57.93 NEUROPATHY INVOLVING BOTH LOWER EXTREMITIES: ICD-10-CM

## 2022-06-07 DIAGNOSIS — R35.0 FREQUENT URINATION: ICD-10-CM

## 2022-06-07 DIAGNOSIS — N52.9 IMPOTENCE DUE TO ERECTILE DYSFUNCTION: ICD-10-CM

## 2022-06-07 DIAGNOSIS — F41.9 ANXIETY: ICD-10-CM

## 2022-06-07 DIAGNOSIS — E78.00 HYPERCHOLESTEREMIA: ICD-10-CM

## 2022-06-07 DIAGNOSIS — Z91.81 AT HIGH RISK FOR FALLS: ICD-10-CM

## 2022-06-07 DIAGNOSIS — N40.1 BENIGN NON-NODULAR PROSTATIC HYPERPLASIA WITH LOWER URINARY TRACT SYMPTOMS: ICD-10-CM

## 2022-06-07 DIAGNOSIS — M19.90 ARTHRITIS: ICD-10-CM

## 2022-06-07 DIAGNOSIS — I10 ESSENTIAL HYPERTENSION: Primary | ICD-10-CM

## 2022-06-07 PROCEDURE — G8417 CALC BMI ABV UP PARAM F/U: HCPCS | Performed by: NURSE PRACTITIONER

## 2022-06-07 PROCEDURE — 1036F TOBACCO NON-USER: CPT | Performed by: NURSE PRACTITIONER

## 2022-06-07 PROCEDURE — 1123F ACP DISCUSS/DSCN MKR DOCD: CPT | Performed by: NURSE PRACTITIONER

## 2022-06-07 PROCEDURE — G8427 DOCREV CUR MEDS BY ELIG CLIN: HCPCS | Performed by: NURSE PRACTITIONER

## 2022-06-07 PROCEDURE — 99214 OFFICE O/P EST MOD 30 MIN: CPT | Performed by: NURSE PRACTITIONER

## 2022-06-07 PROCEDURE — 3017F COLORECTAL CA SCREEN DOC REV: CPT | Performed by: NURSE PRACTITIONER

## 2022-06-07 RX ORDER — TAMSULOSIN HYDROCHLORIDE 0.4 MG/1
0.4 CAPSULE ORAL DAILY
Qty: 180 CAPSULE | Refills: 1 | Status: SHIPPED
Start: 2022-06-07 | End: 2022-08-17 | Stop reason: SDUPTHER

## 2022-06-07 ASSESSMENT — ENCOUNTER SYMPTOMS
VOMITING: 0
SHORTNESS OF BREATH: 0
WHEEZING: 0
CHEST TIGHTNESS: 0
RHINORRHEA: 0
SORE THROAT: 0
ABDOMINAL PAIN: 0
COUGH: 0
DIARRHEA: 0
BACK PAIN: 1
NAUSEA: 0

## 2022-06-07 NOTE — PROGRESS NOTES
CHIEF COMPLAINT  Chief Complaint   Patient presents with    Check-Up     HTN        HPI   Florencia Santos is a 69 y.o. male who presents to the office for checkup. Patient reports doing well. No recent changes in medications or medical illness. No new unusual fatigue or unexplained weight loss. No chest pain or shortness of breath. No abdominal pain or discomfort. No nausea, vomiting, diarrhea. Patient reports taking her medications as prescribed. Patient eating and drinking appropriately. No dysuria, hematuria, urgency, frequency, retention. No other complaints, modifying factors or associated symptoms. Nursing notes reviewed.    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     severe    Hearing loss     decreased hearing left ear    Hyperlipidemia     Hypertension     pos GXT 3/01, neg GXT 3/07    Narcolepsy     Osteoarthritis     Sleep apnea     bipap    Superficial peroneal nerve neuropathy     bilateral     Past Surgical History:   Procedure Laterality Date    APPENDECTOMY      CHOLECYSTECTOMY      COLONOSCOPY  12/19/11    KNEE SURGERY Right 04/22/2016    Rt total knee    OTHER SURGICAL HISTORY Right 09/01/2016    RIGHT TOTAL SHOULDER REPLACEMENT          SHOULDER ARTHROPLASTY  6/19/2012    Lt total shoulder arthroplasty    TOTAL KNEE ARTHROPLASTY Left 07/19/2016     Family History   Problem Relation Age of Onset    Cancer Mother         breast    High Blood Pressure Mother     Arthritis Father     Depression Father     Hearing Loss Father     Heart Disease Father     Stroke Father     Vision Loss Father      Social History     Socioeconomic History    Marital status:      Spouse name: Not on file    Number of children: Not on file    Years of education: Not on file    Highest education level: Not on file   Occupational History    Not on file   Tobacco Use    Smoking status: Former Smoker     Packs/day: 3.00     Years: 32.00     Pack years: 96.00     Quit date: 1999     Years since quittin.4    Smokeless tobacco: Never Used   Substance and Sexual Activity    Alcohol use: No    Drug use: No    Sexual activity: Not on file   Other Topics Concern    Not on file   Social History Narrative    Not on file     Social Determinants of Health     Financial Resource Strain:     Difficulty of Paying Living Expenses: Not on file   Food Insecurity:     Worried About Running Out of Food in the Last Year: Not on file    Marj of Food in the Last Year: Not on file   Transportation Needs:     Lack of Transportation (Medical): Not on file    Lack of Transportation (Non-Medical):  Not on file   Physical Activity:     Days of Exercise per Week: Not on file    Minutes of Exercise per Session: Not on file   Stress:     Feeling of Stress : Not on file   Social Connections:     Frequency of Communication with Friends and Family: Not on file    Frequency of Social Gatherings with Friends and Family: Not on file    Attends Mandaeism Services: Not on file    Active Member of [address] [place] [address] or Organizations: Not on file    Attends Club or Organization Meetings: Not on file    Marital Status: Not on file   Intimate Partner Violence:     Fear of Current or Ex-Partner: Not on file    Emotionally Abused: Not on file    Physically Abused: Not on file    Sexually Abused: Not on file   Housing Stability:     Unable to Pay for Housing in the Last Year: Not on file    Number of [place] in the Last Year: Not on file    Unstable Housing in the Last Year: Not on file     Current Outpatient Medications   Medication Sig Dispense Refill    DULoxetine (CYMBALTA) 60 MG extended release capsule TAKE 1 CAPSULE EVERY DAY 90 capsule 1    gabapentin (NEURONTIN) 300 MG capsule TAKE 2 CAPSULES THREE TIMES DAILY 540 capsule 0    losartan (COZAAR) 100 MG tablet TAKE 1 TABLET EVERY DAY 90 tablet 1    hydroCHLOROthiazide (MICROZIDE) 12.5 MG capsule Take 1 capsule by mouth daily 90 capsule 1    sulindac (CLINORIL) 200 MG tablet Take 1 tablet by mouth daily 90 tablet 1    atenolol (TENORMIN) 100 MG tablet TAKE 1 TABLET EVERY DAY 90 tablet 1    atorvastatin (LIPITOR) 20 MG tablet Take 1 tablet by mouth daily 90 tablet 1    tamsulosin (FLOMAX) 0.4 MG capsule Take 1 capsule by mouth 2 times daily (Patient taking differently: Take 0.4 mg by mouth 2 times daily Pt only taking 1) 180 capsule 1    sildenafil (VIAGRA) 50 MG tablet Take 50 mg by mouth as needed for Erectile Dysfunction      Handicap Placard MISC by Does not apply route 1 each 0    Multiple Vitamin (THERA) TABS Take 1 tablet by mouth daily.  SAW PALMETTO Take  by mouth daily. No current facility-administered medications for this visit. Allergies   Allergen Reactions    Celebrex [Celecoxib]      Nosebleeds    Elavil [Amitriptyline Hcl]      Nose bleed    Topamax      Leg cramps       REVIEW OF SYSTEMS  Review of Systems   Constitutional: Negative for activity change, appetite change, chills and fever. HENT: Negative for congestion, rhinorrhea and sore throat. Eyes: Negative for visual disturbance. Respiratory: Negative for cough, chest tightness, shortness of breath and wheezing. Cardiovascular: Negative for chest pain and leg swelling. Gastrointestinal: Negative for abdominal pain, diarrhea, nausea and vomiting. Genitourinary: Negative for dysuria, frequency, hematuria and urgency. Musculoskeletal: Positive for arthralgias and back pain. Negative for gait problem and myalgias. Skin: Negative for rash. Neurological: Negative for dizziness, weakness, light-headedness, numbness and headaches. Psychiatric/Behavioral: Negative for sleep disturbance.         PHYSICAL EXAM  /81   Pulse 69   Temp 98.1 °F (36.7 °C) (Oral)   Wt (!) 325 lb 6 oz (147.6 kg)   SpO2 93%   BMI 46.69 kg/m²   Physical Exam  Constitutional:       Appearance: Normal appearance. He is obese. He is not toxic-appearing. HENT:      Head: Normocephalic. Right Ear: External ear normal.      Left Ear: External ear normal.      Nose: Nose normal.      Mouth/Throat:      Mouth: Mucous membranes are moist.      Pharynx: Oropharynx is clear. Eyes:      Extraocular Movements: Extraocular movements intact. Conjunctiva/sclera: Conjunctivae normal.      Pupils: Pupils are equal, round, and reactive to light. Cardiovascular:      Rate and Rhythm: Normal rate. Pulses: Normal pulses. Heart sounds: Normal heart sounds. Pulmonary:      Effort: Pulmonary effort is normal.      Breath sounds: Normal breath sounds. Abdominal:      Tenderness: There is no guarding. Musculoskeletal:         General: Normal range of motion. Cervical back: Normal range of motion. Skin:     General: Skin is warm and dry. Capillary Refill: Capillary refill takes 2 to 3 seconds. Neurological:      Mental Status: He is alert and oriented to person, place, and time. ASSESSMENT/PLAN:   1. Essential hypertension  Stable. Patient compliant with losartan 100 mg daily, atenolol 100 mg daily. And hydrochlorothiazide 12.5 mg daily. No episodes of dizziness, headedness, chest pain or shortness of breath. Continue current treatment management follow-up in 6 months, sooner for new or worsening symptoms. 2. Benign non-nodular prostatic hyperplasia with lower urinary tract symptoms  Able. Patient compliant with Flomax 0.4 mg daily. Patient's wife reports that she just realized he was to be taking it twice a day but has only taking it once a day. Patient denies any new urgency, or frequency or change in stream.  Previous PSA within normal limits. Patient compliant with Viagra as well. Continue with current treatment management follow-up in 6 months, sooner for new or worsening symptoms.     3. Neuropathy involving both lower extremities  Stable. Patient currently on Clinoril 200 mg daily, Tylenol arthritis, and gabapentin 600 mg 3 times daily. Patient continues to have pain/Adriana flareups and neuropathy. We did discuss the importance of regular exercise, weight loss and avoiding triggering symptoms. Patient requesting change and increase in medications. I do feel that patient would benefit from following up with pain management therefore recommendations were given at today's visit. Patient verbalized and acknowledges with plan of care at this time. 4. Arthritis  See above #3    5. NICOLE treated with BiPAP  Stable. Patient compliant with use of BiPAP. Continue current treatment management follow-up in 6 months, sooner for new or worsening symptoms. 6. Mild intermittent asthma without complication  Stable. No recent exacerbations. Continue current treatment management follow-up in 6 to 12 months, sooner for new or worsening symptoms. 7. Hypercholesteremia  Stable. Patient compliant with atorvastatin 20 mg nightly. Continue with current treatment management follow-up in 6 months, sooner for new or worsening symptoms. 8. Anxiety  Stable. Patient compliant with Cymbalta 60 mg daily. Patient reports overall doing well. Continue current treatment management follow-up in 6 months, sooner for new or worsening symptoms. 9. Vitamin D deficiency  Stable. Patient compliant with vitamin D supplement. Continue with current treatment management follow-up in 6 to 12 months, sooner for new or worsening symptoms. 10. Impotence due to erectile dysfunction  See above #2    11. At high risk for falls  No recent falls. Patient reports several months ago. On the basis of positive falls risk screening, assessment and plan is as follows: home safety tips provided. The note was completed using Dragon voice recognition transcription.  Every effort was made to ensure accuracy; however, inadvertent  transcription errors may be present despite my best efforts to edit errors.     Colton Dickinson, APRN - CNP

## 2022-06-07 NOTE — PATIENT INSTRUCTIONS
Please read the healthy family handout that you were given and share it with your family. Please compare this printed medication list with your medications at home to be sure they are the same. If you have any medications that are different please contact us immediately at [phone]. Also review your allergies that we have listed, these may also include medications that you have not been able to tolerate, make sure everything listed is correct. If you have any allergies that are different please contact us immediately at [phone].

## 2022-08-04 DIAGNOSIS — I10 ESSENTIAL HYPERTENSION: ICD-10-CM

## 2022-08-05 RX ORDER — ATENOLOL 100 MG/1
TABLET ORAL
Qty: 90 TABLET | Refills: 1 | Status: SHIPPED | OUTPATIENT
Start: 2022-08-05

## 2022-08-05 RX ORDER — ATORVASTATIN CALCIUM 20 MG/1
TABLET, FILM COATED ORAL
Qty: 90 TABLET | Refills: 1 | Status: SHIPPED | OUTPATIENT
Start: 2022-08-05

## 2022-08-16 DIAGNOSIS — R35.0 FREQUENT URINATION: ICD-10-CM

## 2022-08-17 RX ORDER — TAMSULOSIN HYDROCHLORIDE 0.4 MG/1
CAPSULE ORAL
Qty: 180 CAPSULE | Refills: 1 | Status: SHIPPED | OUTPATIENT
Start: 2022-08-17

## 2022-09-07 DIAGNOSIS — M19.90 ARTHRITIS: ICD-10-CM

## 2022-09-08 RX ORDER — HYDROCHLOROTHIAZIDE 12.5 MG/1
CAPSULE, GELATIN COATED ORAL
Qty: 90 CAPSULE | Refills: 1 | Status: SHIPPED | OUTPATIENT
Start: 2022-09-08

## 2022-09-08 RX ORDER — SULINDAC 200 MG/1
TABLET ORAL
Qty: 90 TABLET | Refills: 1 | Status: SHIPPED | OUTPATIENT
Start: 2022-09-08

## 2022-09-27 ENCOUNTER — TELEPHONE (OUTPATIENT)
Dept: FAMILY MEDICINE CLINIC | Age: 67
End: 2022-09-27

## 2022-09-27 DIAGNOSIS — J45.20 MILD INTERMITTENT ASTHMA WITHOUT COMPLICATION: Primary | ICD-10-CM

## 2022-09-27 RX ORDER — IPRATROPIUM BROMIDE AND ALBUTEROL SULFATE 2.5; .5 MG/3ML; MG/3ML
1 SOLUTION RESPIRATORY (INHALATION) EVERY 4 HOURS
Qty: 360 ML | Refills: 1 | Status: SHIPPED | OUTPATIENT
Start: 2022-09-27

## 2022-09-27 RX ORDER — ALBUTEROL SULFATE 90 UG/1
2 AEROSOL, METERED RESPIRATORY (INHALATION) 4 TIMES DAILY PRN
Qty: 18 G | Refills: 5 | Status: SHIPPED | OUTPATIENT
Start: 2022-09-27

## 2022-09-27 NOTE — TELEPHONE ENCOUNTER
Wife calling for patient. She is requesting refill of albuterol inhaler and albuterol nebulizer solution. She said last time these were prescribed is when they were in [place].   She is requesting 90 day supply to [facility]

## 2022-11-24 DIAGNOSIS — G57.93 NEUROPATHY INVOLVING BOTH LOWER EXTREMITIES: ICD-10-CM

## 2022-11-28 RX ORDER — GABAPENTIN 300 MG/1
CAPSULE ORAL
Qty: 540 CAPSULE | Refills: 0 | Status: SHIPPED | OUTPATIENT
Start: 2022-11-28 | End: 2023-02-28

## 2022-11-28 RX ORDER — LOSARTAN POTASSIUM 100 MG/1
TABLET ORAL
Qty: 90 TABLET | Refills: 1 | Status: SHIPPED | OUTPATIENT
Start: 2022-11-28

## 2022-11-28 RX ORDER — DULOXETIN HYDROCHLORIDE 60 MG/1
CAPSULE, DELAYED RELEASE ORAL
Qty: 90 CAPSULE | Refills: 1 | Status: SHIPPED | OUTPATIENT
Start: 2022-11-28

## 2022-11-28 NOTE — TELEPHONE ENCOUNTER
Date of last refill of Gabapentin was 5/19/22, # of pills given 540 and # of refills given 0. Their next appointment is 12/13/22, the last date patient was seen was 6/7/22. Does patient have medication agreement on file? Yes  Has drug screen been done in last 12 months if needed?  no

## 2022-12-13 ENCOUNTER — OFFICE VISIT (OUTPATIENT)
Dept: FAMILY MEDICINE CLINIC | Age: 67
End: 2022-12-13
Payer: MEDICARE

## 2022-12-13 VITALS
OXYGEN SATURATION: 95 % | BODY MASS INDEX: 45.48 KG/M2 | HEART RATE: 79 BPM | DIASTOLIC BLOOD PRESSURE: 92 MMHG | WEIGHT: 315 LBS | SYSTOLIC BLOOD PRESSURE: 154 MMHG | TEMPERATURE: 97.6 F

## 2022-12-13 DIAGNOSIS — Z12.5 SCREENING PSA (PROSTATE SPECIFIC ANTIGEN): ICD-10-CM

## 2022-12-13 DIAGNOSIS — G57.93 NEUROPATHY INVOLVING BOTH LOWER EXTREMITIES: ICD-10-CM

## 2022-12-13 DIAGNOSIS — R73.9 HYPERGLYCEMIA: ICD-10-CM

## 2022-12-13 DIAGNOSIS — E78.00 HYPERCHOLESTEREMIA: ICD-10-CM

## 2022-12-13 DIAGNOSIS — I10 ESSENTIAL HYPERTENSION: Primary | ICD-10-CM

## 2022-12-13 DIAGNOSIS — N29 OTHER DISORDERS OF KIDNEY AND URETER IN DISEASES CLASSIFIED ELSEWHERE: ICD-10-CM

## 2022-12-13 DIAGNOSIS — N40.1 BENIGN NON-NODULAR PROSTATIC HYPERPLASIA WITH LOWER URINARY TRACT SYMPTOMS: ICD-10-CM

## 2022-12-13 DIAGNOSIS — F41.9 ANXIETY: ICD-10-CM

## 2022-12-13 DIAGNOSIS — G47.33 OSA TREATED WITH BIPAP: ICD-10-CM

## 2022-12-13 DIAGNOSIS — E55.9 VITAMIN D DEFICIENCY: ICD-10-CM

## 2022-12-13 DIAGNOSIS — R53.83 OTHER FATIGUE: ICD-10-CM

## 2022-12-13 PROCEDURE — 99214 OFFICE O/P EST MOD 30 MIN: CPT | Performed by: NURSE PRACTITIONER

## 2022-12-13 PROCEDURE — G8417 CALC BMI ABV UP PARAM F/U: HCPCS | Performed by: NURSE PRACTITIONER

## 2022-12-13 PROCEDURE — 3017F COLORECTAL CA SCREEN DOC REV: CPT | Performed by: NURSE PRACTITIONER

## 2022-12-13 PROCEDURE — 1036F TOBACCO NON-USER: CPT | Performed by: NURSE PRACTITIONER

## 2022-12-13 PROCEDURE — 1123F ACP DISCUSS/DSCN MKR DOCD: CPT | Performed by: NURSE PRACTITIONER

## 2022-12-13 PROCEDURE — 3074F SYST BP LT 130 MM HG: CPT | Performed by: NURSE PRACTITIONER

## 2022-12-13 PROCEDURE — G8427 DOCREV CUR MEDS BY ELIG CLIN: HCPCS | Performed by: NURSE PRACTITIONER

## 2022-12-13 PROCEDURE — 3078F DIAST BP <80 MM HG: CPT | Performed by: NURSE PRACTITIONER

## 2022-12-13 PROCEDURE — G8484 FLU IMMUNIZE NO ADMIN: HCPCS | Performed by: NURSE PRACTITIONER

## 2022-12-13 ASSESSMENT — ENCOUNTER SYMPTOMS
SHORTNESS OF BREATH: 0
BACK PAIN: 1
VOMITING: 0
RHINORRHEA: 0
COUGH: 0
SORE THROAT: 0
NAUSEA: 0
CHEST TIGHTNESS: 0
ABDOMINAL PAIN: 0
WHEEZING: 0
DIARRHEA: 0

## 2022-12-13 NOTE — PATIENT INSTRUCTIONS
Please read the healthy family handout that you were given and share it with your family. Please compare this printed medication list with your medications at home to be sure they are the same. If you have any medications that are different please contact us immediately at [phone]. Also review your allergies that we have listed, these may also include medications that you have not been able to tolerate, make sure everything listed is correct. If you have any allergies that are different please contact us immediately at [phone]. You may receive a survey in the mail or by email asking about your experience during your visit today. Please complete and return to us so we know how we are serving you.

## 2022-12-13 NOTE — PROGRESS NOTES
CHIEF COMPLAINT  Chief Complaint   Patient presents with    Check-Up        HPI   Ana Paula Royal is a 69 y.o. male who presents to the office for check up. Patient reports doing well. No recent changes in medications or medical illness. No new unusual fatigue or unexplained weight loss. No chest pain or shortness of breath. No abdominal pain or discomfort. No nausea, vomiting, diarrhea. Patient reports taking her medications as prescribed. Patient eating and drinking appropriately. No dysuria, hematuria, urgency, frequency, retention. No other complaints, modifying factors or associated symptoms. Nursing notes reviewed.    Past Medical History:   Diagnosis Date    Anxiety     Asthma     BPH     prostate 5/05,     Dermatitis due to sun     severe    Hearing loss     decreased hearing left ear    Hyperlipidemia     Hypertension     pos GXT 3/01, neg GXT 3/07    Narcolepsy     Osteoarthritis     Sleep apnea     bipap    Superficial peroneal nerve neuropathy     bilateral     Past Surgical History:   Procedure Laterality Date    APPENDECTOMY      CHOLECYSTECTOMY      COLONOSCOPY  12/19/11    KNEE SURGERY Right 04/22/2016    Rt total knee    OTHER SURGICAL HISTORY Right 09/01/2016    RIGHT TOTAL SHOULDER REPLACEMENT          SHOULDER ARTHROPLASTY  6/19/2012    Lt total shoulder arthroplasty    TOTAL KNEE ARTHROPLASTY Left 07/19/2016     Family History   Problem Relation Age of Onset    Cancer Mother         breast    High Blood Pressure Mother     Arthritis Father     Depression Father     Hearing Loss Father     Heart Disease Father     Stroke Father     Vision Loss Father      Social History     Socioeconomic History    Marital status:      Spouse name: Not on file    Number of children: Not on file    Years of education: Not on file    Highest education level: Not on file   Occupational History    Not on file   Tobacco Use    Smoking status: Former     Packs/day: 3.00     Years: 32.00     Pack years: 96.00     Types: Cigarettes     Quit date: 1999     Years since quittin.9    Smokeless tobacco: Never   Substance and Sexual Activity    Alcohol use: No    Drug use: No    Sexual activity: Not on file   Other Topics Concern    Not on file   Social History Narrative    Not on file     Social Determinants of Health     Financial Resource Strain: Not on file   Food Insecurity: Not on file   Transportation Needs: Not on file   Physical Activity: Not on file   Stress: Not on file   Social Connections: Not on file   Intimate Partner Violence: Not on file   Housing Stability: Not on file     Current Outpatient Medications   Medication Sig Dispense Refill    DULoxetine (CYMBALTA) 60 MG extended release capsule TAKE 1 CAPSULE EVERY DAY 90 capsule 1    gabapentin (NEURONTIN) 300 MG capsule TAKE 2 CAPSULES THREE TIMES DAILY 540 capsule 0    losartan (COZAAR) 100 MG tablet TAKE 1 TABLET EVERY DAY 90 tablet 1    albuterol sulfate HFA (VENTOLIN HFA) 108 (90 Base) MCG/ACT inhaler Inhale 2 puffs into the lungs 4 times daily as needed for Wheezing 18 g 5    ipratropium-albuterol (DUONEB) 0.5-2.5 (3) MG/3ML SOLN nebulizer solution Inhale 3 mLs into the lungs every 4 hours 360 mL 1    sulindac (CLINORIL) 200 MG tablet TAKE 1 TABLET EVERY DAY 90 tablet 1    hydroCHLOROthiazide (MICROZIDE) 12.5 MG capsule TAKE 1 CAPSULE EVERY DAY 90 capsule 1    tamsulosin (FLOMAX) 0.4 MG capsule TAKE 1 CAPSULE TWICE DAILY 180 capsule 1    atorvastatin (LIPITOR) 20 MG tablet TAKE 1 TABLET EVERY DAY 90 tablet 1    atenolol (TENORMIN) 100 MG tablet TAKE 1 TABLET EVERY DAY 90 tablet 1    sildenafil (VIAGRA) 50 MG tablet Take 50 mg by mouth as needed for Erectile Dysfunction      Handicap Placard MISC by Does not apply route 1 each 0    Multiple Vitamin (THERA) TABS Take 1 tablet by mouth daily. SAW PALMETTO Take  by mouth daily. No current facility-administered medications for this visit.      Allergies   Allergen Reactions Celebrex [Celecoxib]      Nosebleeds    Elavil [Amitriptyline Hcl]      Nose bleed    Topamax      Leg cramps       REVIEW OF SYSTEMS  Review of Systems   Constitutional:  Negative for activity change, appetite change, chills and fever. HENT:  Negative for congestion, rhinorrhea and sore throat. Eyes:  Positive for visual disturbance. Respiratory:  Negative for cough, chest tightness, shortness of breath and wheezing. Cardiovascular:  Negative for chest pain and leg swelling. Gastrointestinal:  Negative for abdominal pain, diarrhea, nausea and vomiting. Endocrine: Positive for polydipsia and polyuria. Genitourinary:  Negative for dysuria, frequency, hematuria and urgency. Musculoskeletal:  Positive for arthralgias and back pain. Negative for gait problem and myalgias. Skin:  Negative for rash. Neurological:  Negative for dizziness, weakness, light-headedness, numbness and headaches. Psychiatric/Behavioral:  Negative for sleep disturbance. PHYSICAL EXAM  BP (!) 154/92   Pulse 79   Temp 97.6 °F (36.4 °C) (Oral)   Wt (!) 317 lb (143.8 kg)   SpO2 95%   BMI 45.48 kg/m²   Physical Exam  Constitutional:       Appearance: Normal appearance. He is obese. He is not toxic-appearing. HENT:      Head: Normocephalic. Right Ear: External ear normal.      Left Ear: External ear normal.      Nose: Nose normal.      Mouth/Throat:      Mouth: Mucous membranes are moist.      Pharynx: Oropharynx is clear. Eyes:      Extraocular Movements: Extraocular movements intact. Conjunctiva/sclera: Conjunctivae normal.      Pupils: Pupils are equal, round, and reactive to light. Cardiovascular:      Rate and Rhythm: Normal rate. Pulses: Normal pulses. Heart sounds: Normal heart sounds. Pulmonary:      Effort: Pulmonary effort is normal.      Breath sounds: Normal breath sounds. Abdominal:      Tenderness: There is no guarding.    Musculoskeletal:         General: Normal range of motion. Cervical back: Normal range of motion. Skin:     General: Skin is warm and dry. Capillary Refill: Capillary refill takes 2 to 3 seconds. Neurological:      Mental Status: He is alert and oriented to person, place, and time. ASSESSMENT/PLAN:   1. Essential hypertension  Stable vs. uncontrolled. Patient compliant with losartan 100 mg daily, atenolol 100 mg daily and hydrochlorothiazide 12.5 mg daily. No episodes of dizziness, headedness, chest pain or shortness of breath. Concern for patient having ongoing elevated blood pressure readings. Patient reports that he does check it at home and is still 150s over 90s. I did recommend increasing hydrochlorothiazide however patient reports frequent urination and excessive thirst as is and hesitant to increase medication. Recommendation is to obtain labs as well as renal ultrasound for further evaluation. Patient aware that if blood pressure continues to be elevated and labs/ultrasound benign she will need to follow-up with cardiologist.  Continue current treatment management follow-up in 6 months, sooner for new or worsening symptoms.  - CBC with Auto Differential; Future  - Comprehensive Metabolic Panel; Future  - Hemoglobin A1C; Future  - US RENAL COMPLETE; Future    2. Benign non-nodular prostatic hyperplasia with lower urinary tract symptoms  Stable. Patient reports taking Flomax as directed. Patient continues to report frequent urination. We did discuss labs as well as ultrasound for further evaluation and continuing Flomax at this time. Patient verbalized and acknowledges follow-up in 6 months, sooner for new or worsening symptoms.  - US RENAL COMPLETE; Future    3. Neuropathy involving both lower extremities  Stable. Patient currently on Clinoril 200 mg daily, Tylenol arthritis, and gabapentin 600 mg 3 times daily. Advised on risks associated with anti-inflammatory use.   Patient acknowledges continue with current treatment management follow-up in 6 months, sooner for new or worsening symptoms. 4. NICOLE treated with BiPAP  Stable. Patient compliant with use of BiPAP. Continue current treatment management follow-up in 6 months, sooner for new or worsening symptoms. 5. Anxiety  Stable. Patient compliant with Cymbalta 60 mg daily. Patient reports overall doing well. Continue current treatment management follow-up in 6 months, sooner for new or worsening symptoms. 6. Hypercholesteremia  Stable. Patient compliant with atorvastatin 20 mg nightly. Patient has lost weight since last visit. Continue dietary restrictions and recommendations. Continue with current treatment management follow-up in 6 months, sooner for new or worsening symptoms.  - Lipid, Fasting; Future    7. Vitamin D deficiency  Stable. Patient compliant with vitamin D supplement. Continue with current treatment management follow-up in 6 to 12 months, sooner for new or worsening symptoms. - Vitamin D 25 Hydroxy; Future    8. Screening PSA (prostate specific antigen)  screening recommended. Labs ordered and pending. Continue with current treatment and management follow-up in 6 months, sooner for new or worsening symptoms.  - PSA Screening; Future    9. Other fatigue  Patient Reports chronic fatigue. We discussed concerns with elevated blood pressure readings associated with his fatigue as well as chronic conditions. Patient reports that he takes his medication as directed no missed doses. We did discuss concerns therefore labs as well as ultrasound ordered. Pending results additional medications may be necessary or following up with cardiologist.  Patient reports that he has been seeing floaters in his right eye. \"  Over the past 2 days. Patient reports that his wife did call and make an appointment with Ar[facility] but not until January. I did recommend patient seeing someone sooner such as [id number] however patient declined. Patient aware of risk associated with symptoms as well as not having proper treatment done promptly. Patient acknowledges. - Hemoglobin A1C; Future  - US RENAL COMPLETE; Future    10. Hyperglycemia  Patient reports frequent urination and excessive thirst.  Patient contributes it to his water pills. We did discuss concerns with hypoglycemic episodes and diabetes mellitus. Labs ordered and pending pending results additional work-up necessary. Patient verbalized and acknowledges with plan of care at this time. - Hemoglobin A1C; Future    11. Other disorders of kidney and ureter in diseases classified elsewhere   Above #9  - US RENAL COMPLETE; Future           The note was completed using Dragon voice recognition transcription. Every effort was made to ensure accuracy; however, inadvertent  transcription errors may be present despite my best efforts to edit errors.     JASEN Montana - CNP

## 2022-12-14 ENCOUNTER — NURSE ONLY (OUTPATIENT)
Dept: FAMILY MEDICINE CLINIC | Age: 67
End: 2022-12-14
Payer: MEDICARE

## 2022-12-14 DIAGNOSIS — I10 ESSENTIAL HYPERTENSION: ICD-10-CM

## 2022-12-14 DIAGNOSIS — R73.9 HYPERGLYCEMIA: ICD-10-CM

## 2022-12-14 DIAGNOSIS — R53.83 OTHER FATIGUE: ICD-10-CM

## 2022-12-14 DIAGNOSIS — E55.9 VITAMIN D DEFICIENCY: ICD-10-CM

## 2022-12-14 DIAGNOSIS — E78.00 HYPERCHOLESTEREMIA: ICD-10-CM

## 2022-12-14 DIAGNOSIS — Z12.5 SCREENING PSA (PROSTATE SPECIFIC ANTIGEN): ICD-10-CM

## 2022-12-14 LAB
A/G RATIO: 1.5 (ref 1.1–2.2)
ALBUMIN SERPL-MCNC: 4.3 G/DL (ref 3.4–5)
ALP BLD-CCNC: 87 U/L (ref 40–129)
ALT SERPL-CCNC: 18 U/L (ref 10–40)
ANION GAP SERPL CALCULATED.3IONS-SCNC: 15 MMOL/L (ref 3–16)
AST SERPL-CCNC: 18 U/L (ref 15–37)
BASOPHILS ABSOLUTE: 0.1 K/UL (ref 0–0.2)
BASOPHILS RELATIVE PERCENT: 1.2 %
BILIRUB SERPL-MCNC: 0.9 MG/DL (ref 0–1)
BUN BLDV-MCNC: 30 MG/DL (ref 7–20)
CALCIUM SERPL-MCNC: 9.8 MG/DL (ref 8.3–10.6)
CHLORIDE BLD-SCNC: 104 MMOL/L (ref 99–110)
CHOLESTEROL, FASTING: 122 MG/DL (ref 0–199)
CO2: 24 MMOL/L (ref 21–32)
CREAT SERPL-MCNC: 1.1 MG/DL (ref 0.8–1.3)
EOSINOPHILS ABSOLUTE: 0.2 K/UL (ref 0–0.6)
EOSINOPHILS RELATIVE PERCENT: 3.7 %
GFR SERPL CREATININE-BSD FRML MDRD: >60 ML/MIN/{1.73_M2}
GLUCOSE BLD-MCNC: 102 MG/DL (ref 70–99)
HCT VFR BLD CALC: 49.3 % (ref 40.5–52.5)
HDLC SERPL-MCNC: 41 MG/DL (ref 40–60)
HEMOGLOBIN: 16.2 G/DL (ref 13.5–17.5)
LDL CHOLESTEROL CALCULATED: 66 MG/DL
LYMPHOCYTES ABSOLUTE: 1.2 K/UL (ref 1–5.1)
LYMPHOCYTES RELATIVE PERCENT: 19.6 %
MCH RBC QN AUTO: 29.6 PG (ref 26–34)
MCHC RBC AUTO-ENTMCNC: 32.8 G/DL (ref 31–36)
MCV RBC AUTO: 90.3 FL (ref 80–100)
MONOCYTES ABSOLUTE: 0.7 K/UL (ref 0–1.3)
MONOCYTES RELATIVE PERCENT: 10.5 %
NEUTROPHILS ABSOLUTE: 4 K/UL (ref 1.7–7.7)
NEUTROPHILS RELATIVE PERCENT: 65 %
PDW BLD-RTO: 15 % (ref 12.4–15.4)
PLATELET # BLD: 175 K/UL (ref 135–450)
PMV BLD AUTO: 9.9 FL (ref 5–10.5)
POTASSIUM SERPL-SCNC: 4.5 MMOL/L (ref 3.5–5.1)
PROSTATE SPECIFIC ANTIGEN: 2.18 NG/ML (ref 0–4)
RBC # BLD: 5.46 M/UL (ref 4.2–5.9)
SODIUM BLD-SCNC: 143 MMOL/L (ref 136–145)
TOTAL PROTEIN: 7.1 G/DL (ref 6.4–8.2)
TRIGLYCERIDE, FASTING: 76 MG/DL (ref 0–150)
VITAMIN D 25-HYDROXY: 34.7 NG/ML
VLDLC SERPL CALC-MCNC: 15 MG/DL
WBC # BLD: 6.2 K/UL (ref 4–11)

## 2022-12-14 PROCEDURE — 36415 COLL VENOUS BLD VENIPUNCTURE: CPT | Performed by: NURSE PRACTITIONER

## 2022-12-15 LAB
ESTIMATED AVERAGE GLUCOSE: 102.5 MG/DL
HBA1C MFR BLD: 5.2 %

## 2022-12-27 ENCOUNTER — TELEMEDICINE (OUTPATIENT)
Dept: FAMILY MEDICINE CLINIC | Age: 67
End: 2022-12-27
Payer: MEDICARE

## 2022-12-27 DIAGNOSIS — J06.9 VIRAL URI: Primary | ICD-10-CM

## 2022-12-27 PROCEDURE — G8427 DOCREV CUR MEDS BY ELIG CLIN: HCPCS | Performed by: NURSE PRACTITIONER

## 2022-12-27 PROCEDURE — 1036F TOBACCO NON-USER: CPT | Performed by: NURSE PRACTITIONER

## 2022-12-27 PROCEDURE — 1123F ACP DISCUSS/DSCN MKR DOCD: CPT | Performed by: NURSE PRACTITIONER

## 2022-12-27 PROCEDURE — G8417 CALC BMI ABV UP PARAM F/U: HCPCS | Performed by: NURSE PRACTITIONER

## 2022-12-27 PROCEDURE — 99213 OFFICE O/P EST LOW 20 MIN: CPT | Performed by: NURSE PRACTITIONER

## 2022-12-27 PROCEDURE — G8484 FLU IMMUNIZE NO ADMIN: HCPCS | Performed by: NURSE PRACTITIONER

## 2022-12-27 PROCEDURE — 3017F COLORECTAL CA SCREEN DOC REV: CPT | Performed by: NURSE PRACTITIONER

## 2022-12-27 RX ORDER — AZITHROMYCIN 250 MG/1
250 TABLET, FILM COATED ORAL SEE ADMIN INSTRUCTIONS
Qty: 6 TABLET | Refills: 0 | Status: SHIPPED | OUTPATIENT
Start: 2022-12-27 | End: 2023-01-01

## 2022-12-27 ASSESSMENT — ENCOUNTER SYMPTOMS
GASTROINTESTINAL NEGATIVE: 1
SHORTNESS OF BREATH: 1
SORE THROAT: 1
RHINORRHEA: 1
COUGH: 1

## 2022-12-27 NOTE — PROGRESS NOTES
2022    TELEHEALTH EVALUATION -- Audio/Visual (During SLDEA-35 public health emergency)    HPI:    Chalino Syed (:  1955) has requested an audio/video evaluation for the following concern(s): Sore throat, congestion, headache, right ear pain. Patient reports at times he did have trouble breathing but has been using his inhaler and breathing treatments. Patient reports taking over-the-counter decongestant. No fever or chills. No nausea, vomiting or worsening diarrhea. Patient reports home COVID test x2 negative. Review of Systems   Constitutional: Negative. HENT:  Positive for congestion, ear pain, postnasal drip, rhinorrhea and sore throat. Respiratory:  Positive for cough and shortness of breath. Cardiovascular: Negative. Gastrointestinal: Negative. Genitourinary: Negative. Musculoskeletal: Negative. Skin: Negative. Neurological:  Positive for headaches. Psychiatric/Behavioral: Negative. Prior to Visit Medications    Medication Sig Taking?  Authorizing Provider   azithromycin (ZITHROMAX) 250 MG tablet Take 1 tablet by mouth See Admin Instructions for 5 days 500mg on day 1 followed by 250mg on days 2 - 5 Yes Kalinine JASEN Woodson CNP   DULoxetine (CYMBALTA) 60 MG extended release capsule TAKE 1 CAPSULE EVERY DAY Yes JASEN Kwon CNP   gabapentin (NEURONTIN) 300 MG capsule TAKE 2 CAPSULES THREE TIMES DAILY Yes JASEN Kwon - CNP   losartan (COZAAR) 100 MG tablet TAKE 1 TABLET EVERY DAY Yes Kalinine JASEN Woodson CNP   albuterol sulfate HFA (VENTOLIN HFA) 108 (90 Base) MCG/ACT inhaler Inhale 2 puffs into the lungs 4 times daily as needed for Wheezing Yes Kalinine JASEN Woodson CNP   ipratropium-albuterol (DUONEB) 0.5-2.5 (3) MG/3ML SOLN nebulizer solution Inhale 3 mLs into the lungs every 4 hours Yes JASEN Kwon CNP   sulindac (CLINORIL) 200 MG tablet TAKE 1 TABLET EVERY DAY Yes Faeduarine JASEN Woodson CNP   hydroCHLOROthiazide (MICROZIDE) 12.5 MG capsule TAKE 1 CAPSULE EVERY DAY Yes JASEN Lynne CNP   tamsulosin (FLOMAX) 0.4 MG capsule TAKE 1 CAPSULE TWICE DAILY Yes JASEN Lynne CNP   atorvastatin (LIPITOR) 20 MG tablet TAKE 1 TABLET EVERY DAY Yes JASEN Lynne CNP   atenolol (TENORMIN) 100 MG tablet TAKE 1 TABLET EVERY DAY Yes JASEN Lynne CNP   sildenafil (VIAGRA) 50 MG tablet Take 50 mg by mouth as needed for Erectile Dysfunction Yes Historical Provider, MD   Handicap Placard MISC by Does not apply route Yes JASEN Lynne CNP   Multiple Vitamin (THERA) TABS Take 1 tablet by mouth daily. Yes Historical Provider, MD KRIS HUNG Take  by mouth daily.  Yes Historical Provider, MD       Social History     Tobacco Use    Smoking status: Former     Packs/day: 3.00     Years: 32.00     Pack years: 96.00     Types: Cigarettes     Quit date: 1999     Years since quittin.9    Smokeless tobacco: Never   Substance Use Topics    Alcohol use: No    Drug use: No        Allergies   Allergen Reactions    Celebrex [Celecoxib]      Nosebleeds    Elavil [Amitriptyline Hcl]      Nose bleed    Topamax      Leg cramps   ,   Past Medical History:   Diagnosis Date    Anxiety     Asthma     BPH     prostate ,     Dermatitis due to sun     severe    Hearing loss     decreased hearing left ear    Hyperlipidemia     Hypertension     pos GXT 3/01, neg GXT 3/07    Narcolepsy     Osteoarthritis     Sleep apnea     bipap    Superficial peroneal nerve neuropathy     bilateral   ,   Past Surgical History:   Procedure Laterality Date    APPENDECTOMY      CHOLECYSTECTOMY      COLONOSCOPY  9    KNEE SURGERY Right 2016    Rt total knee    OTHER SURGICAL HISTORY Right 2016    RIGHT TOTAL SHOULDER REPLACEMENT          SHOULDER ARTHROPLASTY  2012    Lt total shoulder arthroplasty    TOTAL KNEE ARTHROPLASTY Left 2016   ,   Social History     Tobacco Use    Smoking status: Former     Packs/day: 3.00     Years: 32.00 Pack years: 96.00     Types: Cigarettes     Quit date: 1999     Years since quittin.9    Smokeless tobacco: Never   Substance Use Topics    Alcohol use: No    Drug use: No   ,   Family History   Problem Relation Age of Onset    Cancer Mother         breast    High Blood Pressure Mother     Arthritis Father     Depression Father     Hearing Loss Father     Heart Disease Father     Stroke Father     Vision Loss Father    ,   Immunization History   Administered Date(s) Administered    COVID-19, MODERNA BLUE border, Primary or Immunocompromised, (age 12y+), IM, 100 mcg/0.5mL 2021, 2021, 2022    COVID-19, MODERNA Bivalent BOOSTER, (age 12y+), IM, 48 mcg/0.5 mL 2022    COVID-19, MODERNA Booster BLUE border, (age 18y+), IM, 50mcg/0.25mL 2021    Influenza Whole 10/26/2010    Influenza, AFLURIA (age 1 yrs+), FLUZONE, (age 10 mo+), MDV, 0.5mL 10/01/2016    Influenza, FLUZONE (age 72 y+), High Dose, 0.7mL 2022    Influenza, High Dose (Fluzone 65 yrs and older) 10/10/2019, 2020, 10/05/2021    Pneumococcal Conjugate 13-valent ([id number]) 2016    Pneumococcal Polysaccharide ([id number]) 2003, 2011, 03/10/2020    Tdap (Boostrix, Adacel) 1999   ,   Health Maintenance   Topic Date Due    Hepatitis C screen  Never done    Shingles vaccine (1 of 2) Never done    DTaP/Tdap/Td vaccine (2 - Td or Tdap) 2009    Depression Screen  2023    Annual Wellness Visit (AWV)  2023    Lipids  2023    Diabetes screen  2025    Colorectal Cancer Screen  2026    Flu vaccine  Completed    Pneumococcal 65+ years Vaccine  Completed    COVID-19 Vaccine  Completed    AAA screen  Completed    Hepatitis A vaccine  Aged Out    Hib vaccine  Aged Out    Meningococcal (ACWY) vaccine  Aged Out       PHYSICAL EXAMINATION:  [ INSTRUCTIONS:  \"[x]\" Indicates a positive item  \"[]\" Indicates a negative item  -- DELETE ALL ITEMS NOT EXAMINED]  Vital Signs: (As obtained by patient/caregiver or practitioner observation)    Due This being a telehealth encounter, evaluation of the following organ systems/vital signs are limited. Constitutional: [x] Appears well-developed and well-nourished [x] No apparent distress      [] Abnormal-   Mental status  [x] Alert and awake  [x] Oriented to person/place/time [x]Able to follow commands      Eyes:  EOM    []  Normal  [] Abnormal-  Sclera  [x]  Normal  [] Abnormal -         Discharge []  None visible  [] Abnormal -    HENT:   [x] Normocephalic, atraumatic. [] Abnormal   [] Mouth/Throat: Mucous membranes are moist.     External Ears [x] Normal  [] Abnormal-     Neck: [x] No visualized mass     Pulmonary/Chest: [x] Respiratory effort normal.  [] No visualized signs of difficulty breathing or respiratory distress        [] Abnormal-      Musculoskeletal:   [] Normal gait with no signs of ataxia         [x] Normal range of motion of neck        [] Abnormal-       Neurological:        [x] No Facial Asymmetry (Cranial nerve 7 motor function) (limited exam to video visit)          [] No gaze palsy        [] Abnormal-         Skin:        [x] No significant exanthematous lesions or discoloration noted on facial skin         [] Abnormal-            Psychiatric:       [x] Normal Affect [] No Hallucinations        [] Abnormal-     Other pertinent observable physical exam findings-     ASSESSMENT/PLAN:  1. Viral URI  Presents with complaints of sore throat, congestion, productive cough with brown sputum, headache and right ear pain. Patient reports that symptoms started last Thursday. Patient reports home COVID test x2 negative. No fever, chills, nausea or vomiting. Patient reports chronic diarrhea but denies any worsening symptoms. Patient reports using his inhaler, breathing treatments, and over-the-counter decongestant. Patient reports that his symptoms are not improving. Patient reports at times he does feel short of breath. Patient reports tenderness inside of his nasal cavity from blowing his nose. We did discuss viral versus bacterial infections. Patient encouraged to continue with over-the-counter decongestants, drinking plenty of fluids and using inhaler/DuoNeb's as prescribed. Immune support vitamins also encouraged. Patient given a course of azithromycin with strict return precautions. Patient to follow-up if symptoms do not improve. - azithromycin (ZITHROMAX) 250 MG tablet; Take 1 tablet by mouth See Admin Instructions for 5 days 500mg on day 1 followed by 250mg on days 2 - 5  Dispense: 6 tablet; Refill: 0    Return if symptoms worsen or fail to improve. Yolie Goncalves, was evaluated through a synchronous (real-time) audio-video encounter. The patient (or guardian if applicable) is aware that this is a billable service, which includes applicable co-pays. This Virtual Visit was conducted with patient's (and/or legal guardian's) consent. The visit was conducted pursuant to the emergency declaration under the [address] [place] [address], [address] [place] [address] authority and the [employer] Act. Patient identification was verified, and a caregiver was present when appropriate. The patient was located at Home: [address] [address]. Provider was located at [facility] (Appt Dept): Ellen Strong [address] [place] [address] 13,  [address] [place] [address]  Total time spent on this encounter: Not billed by time    --JASEN Spaulding CNP on 12/27/2022 at 4:37 PM    An electronic signature was used to authenticate this note.

## 2022-12-27 NOTE — PATIENT INSTRUCTIONS
Please read the healthy family handout that you were given and share it with your family. Please compare this printed medication list with your medications at home to be sure they are the same. If you have any medications that are different please contact us immediately at [phone]. Also review your allergies that we have listed, these may also include medications that you have not been able to tolerate, make sure everything listed is correct. If you have any allergies that are different please contact us immediately at [phone]. You may receive a survey in the mail or by email asking about your experience during your visit today. Please complete and return to us so we know how we are serving you.

## 2023-01-07 ENCOUNTER — HOSPITAL ENCOUNTER (OUTPATIENT)
Dept: ULTRASOUND IMAGING | Age: 68
Discharge: HOME | End: 2023-01-07
Payer: MEDICARE

## 2023-01-07 DIAGNOSIS — N40.1 BENIGN NON-NODULAR PROSTATIC HYPERPLASIA WITH LOWER URINARY TRACT SYMPTOMS: ICD-10-CM

## 2023-01-07 DIAGNOSIS — N29 OTHER DISORDERS OF KIDNEY AND URETER IN DISEASES CLASSIFIED ELSEWHERE: ICD-10-CM

## 2023-01-07 DIAGNOSIS — R53.83 OTHER FATIGUE: ICD-10-CM

## 2023-01-07 DIAGNOSIS — I10 ESSENTIAL HYPERTENSION: ICD-10-CM

## 2023-01-07 PROCEDURE — 76770 US EXAM ABDO BACK WALL COMP: CPT

## 2023-01-09 DIAGNOSIS — R93.429 ABNORMAL RENAL ULTRASOUND: Primary | ICD-10-CM

## 2023-02-16 ENCOUNTER — TELEMEDICINE (OUTPATIENT)
Dept: FAMILY MEDICINE CLINIC | Age: 68
End: 2023-02-16
Payer: MEDICARE

## 2023-02-16 DIAGNOSIS — J06.9 VIRAL URI: Primary | ICD-10-CM

## 2023-02-16 DIAGNOSIS — R09.89 CHEST CONGESTION: ICD-10-CM

## 2023-02-16 PROCEDURE — 1036F TOBACCO NON-USER: CPT | Performed by: NURSE PRACTITIONER

## 2023-02-16 PROCEDURE — 3017F COLORECTAL CA SCREEN DOC REV: CPT | Performed by: NURSE PRACTITIONER

## 2023-02-16 PROCEDURE — G8484 FLU IMMUNIZE NO ADMIN: HCPCS | Performed by: NURSE PRACTITIONER

## 2023-02-16 PROCEDURE — G8427 DOCREV CUR MEDS BY ELIG CLIN: HCPCS | Performed by: NURSE PRACTITIONER

## 2023-02-16 PROCEDURE — 99213 OFFICE O/P EST LOW 20 MIN: CPT | Performed by: NURSE PRACTITIONER

## 2023-02-16 PROCEDURE — 1123F ACP DISCUSS/DSCN MKR DOCD: CPT | Performed by: NURSE PRACTITIONER

## 2023-02-16 PROCEDURE — G8417 CALC BMI ABV UP PARAM F/U: HCPCS | Performed by: NURSE PRACTITIONER

## 2023-02-16 ASSESSMENT — ENCOUNTER SYMPTOMS
SHORTNESS OF BREATH: 1
CHEST TIGHTNESS: 1
GASTROINTESTINAL NEGATIVE: 1
COUGH: 1

## 2023-02-16 NOTE — PROGRESS NOTES
2023    TELEHEALTH EVALUATION -- Audio/Visual (During [employer]-07 public health emergency)    HPI:    Rolly Sanders (:  1955) has requested an audio/video evaluation for the following concern(s): Chest congestion, fever, shortness of breath and chest tightness. Patient reports symptoms started 3 days ago. Patient has been using home nebulizer as well as CPAP machine and does report symptoms have improved. Patient reports daytime cold and cough as well. Patient reports that this morning he felt very short of breath with minimal exertion however he does report symptoms have improved throughout the day. Patient reports home COVID test was negative. Review of Systems   Constitutional:  Positive for fatigue and fever. Respiratory:  Positive for cough, chest tightness and shortness of breath. Cardiovascular: Negative. Gastrointestinal: Negative. Genitourinary: Negative. Musculoskeletal: Negative. Skin: Negative. Neurological: Negative. Psychiatric/Behavioral: Negative. Prior to Visit Medications    Medication Sig Taking?  Authorizing Provider   DULoxetine (CYMBALTA) 60 MG extended release capsule TAKE 1 CAPSULE EVERY DAY Yes JASEN Syed CNP   gabapentin (NEURONTIN) 300 MG capsule TAKE 2 CAPSULES THREE TIMES DAILY Yes JASEN Syed CNP   losartan (COZAAR) 100 MG tablet TAKE 1 TABLET EVERY DAY Yes JASEN Syed CNP   albuterol sulfate HFA (VENTOLIN HFA) 108 (90 Base) MCG/ACT inhaler Inhale 2 puffs into the lungs 4 times daily as needed for Wheezing Yes JASEN ySed CNP   ipratropium-albuterol (DUONEB) 0.5-2.5 (3) MG/3ML SOLN nebulizer solution Inhale 3 mLs into the lungs every 4 hours Yes JASEN Syed CNP   sulindac (CLINORIL) 200 MG tablet TAKE 1 TABLET EVERY DAY Yes JASEN Syed CNP   hydroCHLOROthiazide (MICROZIDE) 12.5 MG capsule TAKE 1 CAPSULE EVERY DAY Yes JASEN Syed CNP   tamsulosin (FLOMAX) 0.4 MG capsule TAKE 1 CAPSULE TWICE DAILY Yes JASEN Puga CNP   atorvastatin (LIPITOR) 20 MG tablet TAKE 1 TABLET EVERY DAY Yes JASEN Puga CNP   atenolol (TENORMIN) 100 MG tablet TAKE 1 TABLET EVERY DAY Yes Tiffany AvilesJASEN CNP   sildenafil (VIAGRA) 50 MG tablet Take 50 mg by mouth as needed for Erectile Dysfunction Yes Historical Provider, MD   Handicap Placard MISC by Does not apply route Yes JASEN Puga CNP   Multiple Vitamin (THERA) TABS Take 1 tablet by mouth daily. Yes Historical Provider, MD KRIS HUNG Take  by mouth daily.  Yes Historical Provider, MD       Social History     Tobacco Use    Smoking status: Former     Packs/day: 3.00     Years: 32.00     Pack years: 96.00     Types: Cigarettes     Quit date: 1999     Years since quittin.1    Smokeless tobacco: Never   Substance Use Topics    Alcohol use: No    Drug use: No        Allergies   Allergen Reactions    Celebrex [Celecoxib]      Nosebleeds    Elavil [Amitriptyline Hcl]      Nose bleed    Topamax      Leg cramps   ,   Past Medical History:   Diagnosis Date    Anxiety     Asthma     BPH     prostate ,     Dermatitis due to sun     severe    Hearing loss     decreased hearing left ear    Hyperlipidemia     Hypertension     pos GXT 3/01, neg GXT 3/07    Narcolepsy     Osteoarthritis     Sleep apnea     bipap    Superficial peroneal nerve neuropathy     bilateral   ,   Past Surgical History:   Procedure Laterality Date    APPENDECTOMY      CHOLECYSTECTOMY      COLONOSCOPY  9    KNEE SURGERY Right 2016    Rt total knee    OTHER SURGICAL HISTORY Right 2016    RIGHT TOTAL SHOULDER REPLACEMENT          SHOULDER ARTHROPLASTY  2012    Lt total shoulder arthroplasty    TOTAL KNEE ARTHROPLASTY Left 2016   ,   Social History     Tobacco Use    Smoking status: Former     Packs/day: 3.00     Years: 32.00     Pack years: 96.00     Types: Cigarettes     Quit date: 1999     Years since quittin.1 Smokeless tobacco: Never   Substance Use Topics    Alcohol use: No    Drug use: No   ,   Family History   Problem Relation Age of Onset    Cancer Mother         breast    High Blood Pressure Mother     Arthritis Father     Depression Father     Hearing Loss Father     Heart Disease Father     Stroke Father     Vision Loss Father    ,   Immunization History   Administered Date(s) Administered    COVID-19, MODERNA BLUE border, Primary or Immunocompromised, (age 12y+), IM, 100 mcg/0.5mL 03/26/2021, 05/03/2021, 04/06/2022    COVID-19, MODERNA Bivalent BOOSTER, (age 12y+), IM, 48 mcg/0.5 mL 09/13/2022    COVID-19, MODERNA Booster BLUE border, (age 18y+), IM, 50mcg/0.25mL 09/14/2021    Influenza Whole 10/26/2010    Influenza, AFLURIA (age 1 yrs+), FLUZONE, (age 10 mo+), MDV, 0.5mL 10/01/2016    Influenza, FLUZONE (age 72 y+), High Dose, 0.7mL 09/13/2022    Influenza, High Dose (Fluzone 65 yrs and older) 10/10/2019, 09/01/2020, 10/05/2021    Pneumococcal Conjugate 13-valent ([id number]) 01/05/2016    Pneumococcal Polysaccharide ([id number]) 11/01/2003, 08/20/2011, 03/10/2020    Tdap (Boostrix, Adacel) 04/09/1999   ,   Health Maintenance   Topic Date Due    Hepatitis C screen  Never done    DTaP/Tdap/Td vaccine (2 - Td or Tdap) 04/09/2009    Depression Screen  01/24/2023    Annual Wellness Visit (AWV)  01/25/2023    Shingles vaccine (2 of 2) 03/14/2023    Lipids  12/14/2023    Colorectal Cancer Screen  12/28/2026    Flu vaccine  Completed    Pneumococcal 65+ years Vaccine  Completed    COVID-19 Vaccine  Completed    AAA screen  Completed    Hepatitis A vaccine  Aged Out    Hib vaccine  Aged Out    Meningococcal (ACWY) vaccine  Aged Out       PHYSICAL EXAMINATION:  [ INSTRUCTIONS:  \"[x]\" Indicates a positive item  \"[]\" Indicates a negative item  -- DELETE ALL ITEMS NOT EXAMINED]  Vital Signs: (As obtained by patient/caregiver or practitioner observation)    Due to Being a telehealth encounter, evaluation of the following organ systems/vital signs are limited. Constitutional: [x] Appears well-developed and well-nourished [x] No apparent distress      [] Abnormal-   Mental status  [x] Alert and awake  [x] Oriented to person/place/time [x]Able to follow commands      Eyes:  EOM    [x]  Normal  [] Abnormal-  Sclera  []  Normal  [] Abnormal -         Discharge [x]  None visible  [] Abnormal -    HENT:   [x] Normocephalic, atraumatic. [] Abnormal   [x] Mouth/Throat: Mucous membranes are moist.     External Ears [x] Normal  [] Abnormal-     Neck: [x] No visualized mass     Pulmonary/Chest: [x] Respiratory effort normal.  [x] No visualized signs of difficulty breathing or respiratory distress        [] Abnormal-      Musculoskeletal:   [] Normal gait with no signs of ataxia         [x] Normal range of motion of neck        [] Abnormal-       Neurological:        [x] No Facial Asymmetry (Cranial nerve 7 motor function) (limited exam to video visit)          [] No gaze palsy        [] Abnormal-         Skin:        [x] No significant exanthematous lesions or discoloration noted on facial skin         [] Abnormal-            Psychiatric:       [x] Normal Affect [x] No Hallucinations        [] Abnormal-     Other pertinent observable physical exam findings-     ASSESSMENT/PLAN:  1. Viral URI  Presents today via virtual visit with complaints of chest congestion, fever, shortness of breath and chest tightness. Patient reports that symptoms ongoing for 3 days. Patient reports using home nebulizer as well as wearing his CPAP which has helped. Patient also reports using over-the-counter cold and flu medicine. Patient reports that his symptoms have improved throughout today. Patient reports fever was 99.3. Patient reports home COVID test was negative. I did recommend repeat testing as well as chest x-ray due to concerns of pneumonia.   Patient will come in tomorrow morning for additional testing and if shortness of breath would progress he would need to go to ER prompt evaluation. Patient to continue with current treatment patient verbalized and acknowledges with plan of care at this time. 2. Chest congestion  See above #1  - POCT COVID-19 Rapid, NAAT; Future  - XR CHEST STANDARD (2 VW); Future  - POCT Influenza A/B Antigen (BD Veritor); Future    Return if symptoms worsen or fail to improve. Byron Cee, was evaluated through a synchronous (real-time) audio-video encounter. The patient (or guardian if applicable) is aware that this is a billable service, which includes applicable co-pays. This Virtual Visit was conducted with patient's (and/or legal guardian's) consent. The visit was conducted pursuant to the emergency declaration under the [address] [place] [address], [address] [place] [address] waiver authority and the [employer] and "[employer] General Act. Patient identification was verified, and a caregiver was present when appropriate. The patient was located at Home: [address] [place] [address]  Provider was located at [facility] ([place] [address]): Ellen Strong [address] [place] [address] 13  [address] [place] [address]         Total time spent on this encounter: Not billed by time    --JASEN Cruz CNP on 2/16/2023 at 3:37 PM    An electronic signature was used to authenticate this note.

## 2023-02-16 NOTE — PATIENT INSTRUCTIONS
Please read the healthy family handout that you were given and share it with your family. Please compare this printed medication list with your medications at home to be sure they are the same. If you have any medications that are different please contact us immediately at [phone]. Also review your allergies that we have listed, these may also include medications that you have not been able to tolerate, make sure everything listed is correct. If you have any allergies that are different please contact us immediately at [phone]. You may receive a survey in the mail or by email asking about your experience during your visit today. Please complete and return to us so we know how we are serving you.

## 2023-02-17 ENCOUNTER — APPOINTMENT (OUTPATIENT)
Dept: CT IMAGING | Age: 68
DRG: 193 | End: 2023-02-17
Payer: MEDICARE

## 2023-02-17 ENCOUNTER — APPOINTMENT (OUTPATIENT)
Dept: GENERAL RADIOLOGY | Age: 68
DRG: 193 | End: 2023-02-17
Payer: MEDICARE

## 2023-02-17 ENCOUNTER — HOSPITAL ENCOUNTER (INPATIENT)
Age: 68
LOS: 3 days | Discharge: HOME | DRG: 193 | End: 2023-02-20
Attending: EMERGENCY MEDICINE | Admitting: INTERNAL MEDICINE
Payer: MEDICARE

## 2023-02-17 ENCOUNTER — NURSE ONLY (OUTPATIENT)
Dept: FAMILY MEDICINE CLINIC | Age: 68
End: 2023-02-17

## 2023-02-17 VITALS — OXYGEN SATURATION: 90 %

## 2023-02-17 DIAGNOSIS — J18.9 PNEUMONIA DUE TO INFECTIOUS ORGANISM, UNSPECIFIED LATERALITY, UNSPECIFIED PART OF LUNG: ICD-10-CM

## 2023-02-17 DIAGNOSIS — R53.83 FATIGUE, UNSPECIFIED TYPE: Primary | ICD-10-CM

## 2023-02-17 DIAGNOSIS — R05.9 COUGH, UNSPECIFIED TYPE: ICD-10-CM

## 2023-02-17 DIAGNOSIS — R06.02 SHORTNESS OF BREATH: ICD-10-CM

## 2023-02-17 DIAGNOSIS — E04.9 ENLARGED THYROID: ICD-10-CM

## 2023-02-17 DIAGNOSIS — R91.8 PULMONARY NODULES: ICD-10-CM

## 2023-02-17 DIAGNOSIS — R09.89 CHEST CONGESTION: ICD-10-CM

## 2023-02-17 DIAGNOSIS — I48.91 ATRIAL FIBRILLATION, UNSPECIFIED TYPE (HCC): Primary | ICD-10-CM

## 2023-02-17 DIAGNOSIS — R06.2 WHEEZING: ICD-10-CM

## 2023-02-17 DIAGNOSIS — I51.7 CARDIOMEGALY: ICD-10-CM

## 2023-02-17 DIAGNOSIS — R06.02 SOB (SHORTNESS OF BREATH): ICD-10-CM

## 2023-02-17 DIAGNOSIS — R50.9 FEVER, UNSPECIFIED FEVER CAUSE: ICD-10-CM

## 2023-02-17 PROBLEM — I45.10 RBBB: Status: ACTIVE | Noted: 2023-02-17

## 2023-02-17 PROBLEM — I50.9 ACUTE CONGESTIVE HEART FAILURE (HCC): Status: ACTIVE | Noted: 2023-02-17

## 2023-02-17 LAB
A/G RATIO: 1.6 (ref 1.1–2.2)
ALBUMIN SERPL-MCNC: 4.4 G/DL (ref 3.4–5)
ALP BLD-CCNC: 89 U/L (ref 40–129)
ALT SERPL-CCNC: 16 U/L (ref 10–40)
ANION GAP SERPL CALCULATED.3IONS-SCNC: 13 MMOL/L (ref 3–16)
AST SERPL-CCNC: 27 U/L (ref 15–37)
BASE EXCESS VENOUS: -0.4 MMOL/L (ref -3–3)
BASOPHILS ABSOLUTE: 0 K/UL (ref 0–0.2)
BASOPHILS RELATIVE PERCENT: 0.7 %
BILIRUB SERPL-MCNC: 1.8 MG/DL (ref 0–1)
BILIRUBIN URINE: NEGATIVE
BLOOD, URINE: NEGATIVE
BUN BLDV-MCNC: 18 MG/DL (ref 7–20)
CALCIUM SERPL-MCNC: 8.4 MG/DL (ref 8.3–10.6)
CARBOXYHEMOGLOBIN: 3.9 % (ref 0–1.5)
CHLORIDE BLD-SCNC: 104 MMOL/L (ref 99–110)
CLARITY: CLEAR
CO2: 24 MMOL/L (ref 21–32)
COLOR: YELLOW
CREAT SERPL-MCNC: 0.9 MG/DL (ref 0.8–1.3)
EKG ATRIAL RATE: 133 BPM
EKG DIAGNOSIS: NORMAL
EKG Q-T INTERVAL: 390 MS
EKG QRS DURATION: 146 MS
EKG QTC CALCULATION (BAZETT): 530 MS
EKG R AXIS: -14 DEGREES
EKG T AXIS: 9 DEGREES
EKG VENTRICULAR RATE: 111 BPM
EOSINOPHILS ABSOLUTE: 0.3 K/UL (ref 0–0.6)
EOSINOPHILS RELATIVE PERCENT: 4.9 %
GFR SERPL CREATININE-BSD FRML MDRD: >60 ML/MIN/{1.73_M2}
GLUCOSE BLD-MCNC: 119 MG/DL (ref 70–99)
GLUCOSE URINE: NEGATIVE MG/DL
HCO3 VENOUS: 24.4 MMOL/L (ref 23–29)
HCT VFR BLD CALC: 40.4 % (ref 40.5–52.5)
HEMOGLOBIN: 13.4 G/DL (ref 13.5–17.5)
INFLUENZA A ANTIGEN, POC: NORMAL
INFLUENZA A: NOT DETECTED
INFLUENZA B ANTIGEN, POC: NORMAL
INFLUENZA B: NOT DETECTED
KETONES, URINE: NEGATIVE MG/DL
LACTIC ACID, SEPSIS: 1.1 MMOL/L (ref 0.4–1.9)
LACTIC ACID: 0.8 MMOL/L (ref 0.4–2)
LEUKOCYTE ESTERASE, URINE: NEGATIVE
LYMPHOCYTES ABSOLUTE: 0.7 K/UL (ref 1–5.1)
LYMPHOCYTES RELATIVE PERCENT: 11.5 %
Lab: 0
MAGNESIUM: 1.9 MG/DL (ref 1.8–2.4)
MCH RBC QN AUTO: 29.6 PG (ref 26–34)
MCHC RBC AUTO-ENTMCNC: 33.2 G/DL (ref 31–36)
MCV RBC AUTO: 89.2 FL (ref 80–100)
METHEMOGLOBIN VENOUS: 0.3 %
MICROSCOPIC EXAMINATION: YES
MONOCYTES ABSOLUTE: 0.7 K/UL (ref 0–1.3)
MONOCYTES RELATIVE PERCENT: 10.4 %
NEUTROPHILS ABSOLUTE: 4.6 K/UL (ref 1.7–7.7)
NEUTROPHILS RELATIVE PERCENT: 72.5 %
NITRITE, URINE: NEGATIVE
O2 CONTENT, VEN: 18 VOL %
O2 SAT, VEN: 97 %
O2 THERAPY: ABNORMAL
PCO2, VEN: 40.6 MMHG (ref 40–50)
PDW BLD-RTO: 14.6 % (ref 12.4–15.4)
PH UA: 7 (ref 5–8)
PH VENOUS: 7.4 (ref 7.35–7.45)
PLATELET # BLD: 117 K/UL (ref 135–450)
PMV BLD AUTO: 9.8 FL (ref 5–10.5)
PO2, VEN: 86.5 MMHG (ref 25–40)
POTASSIUM REFLEX MAGNESIUM: 4.9 MMOL/L (ref 3.5–5.1)
PRO-BNP: 4870 PG/ML (ref 0–124)
PROCALCITONIN: 0.08 NG/ML (ref 0–0.15)
PROTEIN UA: 30 MG/DL
QC PASS/FAIL: NORMAL
RBC # BLD: 4.53 M/UL (ref 4.2–5.9)
RBC UA: NORMAL /HPF (ref 0–4)
RSV RAPID ANTIGEN: NEGATIVE
S PYO AG THROAT QL: NEGATIVE
SARS-COV-2 RDRP RESP QL NAA+PROBE: NEGATIVE
SARS-COV-2 RNA, RT PCR: NOT DETECTED
SODIUM BLD-SCNC: 141 MMOL/L (ref 136–145)
SPECIFIC GRAVITY UA: <=1.005 (ref 1–1.03)
TCO2 CALC VENOUS: 26 MMOL/L
TOTAL PROTEIN: 7.2 G/DL (ref 6.4–8.2)
TROPONIN: <0.01 NG/ML
TROPONIN: <0.01 NG/ML
TSH REFLEX: 1.52 UIU/ML (ref 0.27–4.2)
URINE REFLEX TO CULTURE: ABNORMAL
URINE TYPE: ABNORMAL
UROBILINOGEN, URINE: 1 E.U./DL
WBC # BLD: 6.3 K/UL (ref 4–11)
WBC UA: NORMAL /HPF (ref 0–5)

## 2023-02-17 PROCEDURE — 85025 COMPLETE CBC W/AUTO DIFF WBC: CPT

## 2023-02-17 PROCEDURE — 99285 EMERGENCY DEPT VISIT HI MDM: CPT

## 2023-02-17 PROCEDURE — 94640 AIRWAY INHALATION TREATMENT: CPT

## 2023-02-17 PROCEDURE — 71260 CT THORAX DX C+: CPT | Performed by: PHYSICIAN ASSISTANT

## 2023-02-17 PROCEDURE — 94761 N-INVAS EAR/PLS OXIMETRY MLT: CPT

## 2023-02-17 PROCEDURE — 80053 COMPREHEN METABOLIC PANEL: CPT

## 2023-02-17 PROCEDURE — 2580000003 HC RX 258: Performed by: PHYSICIAN ASSISTANT

## 2023-02-17 PROCEDURE — 6360000002 HC RX W HCPCS: Performed by: EMERGENCY MEDICINE

## 2023-02-17 PROCEDURE — 87081 CULTURE SCREEN ONLY: CPT

## 2023-02-17 PROCEDURE — 93005 ELECTROCARDIOGRAM TRACING: CPT | Performed by: PHYSICIAN ASSISTANT

## 2023-02-17 PROCEDURE — 84145 PROCALCITONIN (PCT): CPT

## 2023-02-17 PROCEDURE — 84443 ASSAY THYROID STIM HORMONE: CPT

## 2023-02-17 PROCEDURE — 94660 CPAP INITIATION&MGMT: CPT

## 2023-02-17 PROCEDURE — 2060000000 HC ICU INTERMEDIATE R&B

## 2023-02-17 PROCEDURE — 96375 TX/PRO/DX INJ NEW DRUG ADDON: CPT

## 2023-02-17 PROCEDURE — 2700000000 HC OXYGEN THERAPY PER DAY

## 2023-02-17 PROCEDURE — 87070 CULTURE OTHR SPECIMN AEROBIC: CPT

## 2023-02-17 PROCEDURE — 6370000000 HC RX 637 (ALT 250 FOR IP): Performed by: EMERGENCY MEDICINE

## 2023-02-17 PROCEDURE — 6370000000 HC RX 637 (ALT 250 FOR IP): Performed by: PHYSICIAN ASSISTANT

## 2023-02-17 PROCEDURE — 83735 ASSAY OF MAGNESIUM: CPT

## 2023-02-17 PROCEDURE — 99223 1ST HOSP IP/OBS HIGH 75: CPT | Performed by: INTERNAL MEDICINE

## 2023-02-17 PROCEDURE — 87807 RSV ASSAY W/OPTIC: CPT

## 2023-02-17 PROCEDURE — 96374 THER/PROPH/DIAG INJ IV PUSH: CPT

## 2023-02-17 PROCEDURE — 93010 ELECTROCARDIOGRAM REPORT: CPT | Performed by: INTERNAL MEDICINE

## 2023-02-17 PROCEDURE — 87205 SMEAR GRAM STAIN: CPT

## 2023-02-17 PROCEDURE — 6360000002 HC RX W HCPCS: Performed by: PHYSICIAN ASSISTANT

## 2023-02-17 PROCEDURE — 6360000002 HC RX W HCPCS: Performed by: INTERNAL MEDICINE

## 2023-02-17 PROCEDURE — 82803 BLOOD GASES ANY COMBINATION: CPT

## 2023-02-17 PROCEDURE — 71045 X-RAY EXAM CHEST 1 VIEW: CPT

## 2023-02-17 PROCEDURE — 81001 URINALYSIS AUTO W/SCOPE: CPT

## 2023-02-17 PROCEDURE — 6370000000 HC RX 637 (ALT 250 FOR IP)

## 2023-02-17 PROCEDURE — 87880 STREP A ASSAY W/OPTIC: CPT

## 2023-02-17 PROCEDURE — 36415 COLL VENOUS BLD VENIPUNCTURE: CPT

## 2023-02-17 PROCEDURE — 6360000004 HC RX CONTRAST MEDICATION: Performed by: PHYSICIAN ASSISTANT

## 2023-02-17 PROCEDURE — 96361 HYDRATE IV INFUSION ADD-ON: CPT

## 2023-02-17 PROCEDURE — 6360000002 HC RX W HCPCS

## 2023-02-17 PROCEDURE — 84484 ASSAY OF TROPONIN QUANT: CPT

## 2023-02-17 PROCEDURE — 87040 BLOOD CULTURE FOR BACTERIA: CPT

## 2023-02-17 PROCEDURE — 83880 ASSAY OF NATRIURETIC PEPTIDE: CPT

## 2023-02-17 PROCEDURE — 99223 1ST HOSP IP/OBS HIGH 75: CPT

## 2023-02-17 PROCEDURE — 87636 SARSCOV2 & INF A&B AMP PRB: CPT

## 2023-02-17 PROCEDURE — 2580000003 HC RX 258

## 2023-02-17 PROCEDURE — 83605 ASSAY OF LACTIC ACID: CPT

## 2023-02-17 RX ORDER — LEVALBUTEROL 1.25 MG/.5ML
1.25 SOLUTION, CONCENTRATE RESPIRATORY (INHALATION) ONCE
Status: COMPLETED | OUTPATIENT
Start: 2023-02-17 | End: 2023-02-17

## 2023-02-17 RX ORDER — IPRATROPIUM BROMIDE AND ALBUTEROL SULFATE 2.5; .5 MG/3ML; MG/3ML
1 SOLUTION RESPIRATORY (INHALATION) EVERY 4 HOURS
Status: DISCONTINUED | OUTPATIENT
Start: 2023-02-17 | End: 2023-02-17

## 2023-02-17 RX ORDER — ACETAMINOPHEN 325 MG/1
650 TABLET ORAL EVERY 6 HOURS PRN
Status: DISCONTINUED | OUTPATIENT
Start: 2023-02-17 | End: 2023-02-20 | Stop reason: HOSPADM

## 2023-02-17 RX ORDER — SODIUM CHLORIDE 0.9 % (FLUSH) 0.9 %
10 SYRINGE (ML) INJECTION PRN
Status: DISCONTINUED | OUTPATIENT
Start: 2023-02-17 | End: 2023-02-20 | Stop reason: HOSPADM

## 2023-02-17 RX ORDER — POLYETHYLENE GLYCOL 3350 17 G/17G
17 POWDER, FOR SOLUTION ORAL DAILY PRN
Status: DISCONTINUED | OUTPATIENT
Start: 2023-02-17 | End: 2023-02-20 | Stop reason: HOSPADM

## 2023-02-17 RX ORDER — FUROSEMIDE 10 MG/ML
40 INJECTION INTRAMUSCULAR; INTRAVENOUS ONCE
Status: COMPLETED | OUTPATIENT
Start: 2023-02-17 | End: 2023-02-17

## 2023-02-17 RX ORDER — DILTIAZEM HYDROCHLORIDE 60 MG/1
60 TABLET, FILM COATED ORAL EVERY 6 HOURS SCHEDULED
Status: DISCONTINUED | OUTPATIENT
Start: 2023-02-17 | End: 2023-02-19

## 2023-02-17 RX ORDER — BENZONATATE 100 MG/1
100 CAPSULE ORAL 3 TIMES DAILY PRN
Status: DISCONTINUED | OUTPATIENT
Start: 2023-02-17 | End: 2023-02-20 | Stop reason: HOSPADM

## 2023-02-17 RX ORDER — ATORVASTATIN CALCIUM 10 MG/1
20 TABLET, FILM COATED ORAL DAILY
Status: DISCONTINUED | OUTPATIENT
Start: 2023-02-17 | End: 2023-02-20 | Stop reason: HOSPADM

## 2023-02-17 RX ORDER — FUROSEMIDE 10 MG/ML
40 INJECTION INTRAMUSCULAR; INTRAVENOUS DAILY
Status: DISCONTINUED | OUTPATIENT
Start: 2023-02-18 | End: 2023-02-19

## 2023-02-17 RX ORDER — METHYLPREDNISOLONE SODIUM SUCCINATE 125 MG/2ML
80 INJECTION, POWDER, LYOPHILIZED, FOR SOLUTION INTRAMUSCULAR; INTRAVENOUS ONCE
Status: DISCONTINUED | OUTPATIENT
Start: 2023-02-17 | End: 2023-02-17

## 2023-02-17 RX ORDER — DULOXETIN HYDROCHLORIDE 60 MG/1
60 CAPSULE, DELAYED RELEASE ORAL DAILY
Status: DISCONTINUED | OUTPATIENT
Start: 2023-02-17 | End: 2023-02-20 | Stop reason: HOSPADM

## 2023-02-17 RX ORDER — SODIUM CHLORIDE 0.9 % (FLUSH) 0.9 %
5-40 SYRINGE (ML) INJECTION EVERY 12 HOURS SCHEDULED
Status: DISCONTINUED | OUTPATIENT
Start: 2023-02-17 | End: 2023-02-20 | Stop reason: HOSPADM

## 2023-02-17 RX ORDER — ONDANSETRON 2 MG/ML
4 INJECTION INTRAMUSCULAR; INTRAVENOUS EVERY 6 HOURS PRN
Status: DISCONTINUED | OUTPATIENT
Start: 2023-02-17 | End: 2023-02-18

## 2023-02-17 RX ORDER — LOSARTAN POTASSIUM 100 MG/1
100 TABLET ORAL DAILY
Status: DISCONTINUED | OUTPATIENT
Start: 2023-02-17 | End: 2023-02-20 | Stop reason: HOSPADM

## 2023-02-17 RX ORDER — TAMSULOSIN HYDROCHLORIDE 0.4 MG/1
0.4 CAPSULE ORAL 2 TIMES DAILY
Status: DISCONTINUED | OUTPATIENT
Start: 2023-02-17 | End: 2023-02-20 | Stop reason: HOSPADM

## 2023-02-17 RX ORDER — ENOXAPARIN SODIUM 150 MG/ML
1 INJECTION SUBCUTANEOUS 2 TIMES DAILY
Status: DISCONTINUED | OUTPATIENT
Start: 2023-02-17 | End: 2023-02-20 | Stop reason: HOSPADM

## 2023-02-17 RX ORDER — IPRATROPIUM BROMIDE AND ALBUTEROL SULFATE 2.5; .5 MG/3ML; MG/3ML
1 SOLUTION RESPIRATORY (INHALATION) 4 TIMES DAILY
Status: DISCONTINUED | OUTPATIENT
Start: 2023-02-17 | End: 2023-02-19

## 2023-02-17 RX ORDER — FUROSEMIDE 10 MG/ML
40 INJECTION INTRAMUSCULAR; INTRAVENOUS DAILY
Status: CANCELLED | OUTPATIENT
Start: 2023-02-17

## 2023-02-17 RX ORDER — ALBUTEROL SULFATE 90 UG/1
2 AEROSOL, METERED RESPIRATORY (INHALATION) 4 TIMES DAILY PRN
Status: DISCONTINUED | OUTPATIENT
Start: 2023-02-17 | End: 2023-02-20 | Stop reason: HOSPADM

## 2023-02-17 RX ORDER — ONDANSETRON 2 MG/ML
4 INJECTION INTRAMUSCULAR; INTRAVENOUS ONCE
Status: COMPLETED | OUTPATIENT
Start: 2023-02-17 | End: 2023-02-17

## 2023-02-17 RX ORDER — IPRATROPIUM BROMIDE AND ALBUTEROL SULFATE 2.5; .5 MG/3ML; MG/3ML
SOLUTION RESPIRATORY (INHALATION)
Status: COMPLETED
Start: 2023-02-17 | End: 2023-02-17

## 2023-02-17 RX ORDER — GABAPENTIN 300 MG/1
600 CAPSULE ORAL 3 TIMES DAILY
Status: DISCONTINUED | OUTPATIENT
Start: 2023-02-17 | End: 2023-02-20 | Stop reason: HOSPADM

## 2023-02-17 RX ORDER — GUAIFENESIN 600 MG/1
600 TABLET, EXTENDED RELEASE ORAL 2 TIMES DAILY
Status: DISCONTINUED | OUTPATIENT
Start: 2023-02-17 | End: 2023-02-20 | Stop reason: HOSPADM

## 2023-02-17 RX ORDER — AZITHROMYCIN 250 MG/1
500 TABLET, FILM COATED ORAL EVERY 24 HOURS
Status: COMPLETED | OUTPATIENT
Start: 2023-02-17 | End: 2023-02-19

## 2023-02-17 RX ORDER — SODIUM CHLORIDE 9 MG/ML
INJECTION, SOLUTION INTRAVENOUS PRN
Status: DISCONTINUED | OUTPATIENT
Start: 2023-02-17 | End: 2023-02-20 | Stop reason: HOSPADM

## 2023-02-17 RX ORDER — IPRATROPIUM BROMIDE AND ALBUTEROL SULFATE 2.5; .5 MG/3ML; MG/3ML
1 SOLUTION RESPIRATORY (INHALATION) ONCE
Status: COMPLETED | OUTPATIENT
Start: 2023-02-17 | End: 2023-02-17

## 2023-02-17 RX ORDER — MORPHINE SULFATE 4 MG/ML
4 INJECTION, SOLUTION INTRAMUSCULAR; INTRAVENOUS ONCE
Status: COMPLETED | OUTPATIENT
Start: 2023-02-17 | End: 2023-02-17

## 2023-02-17 RX ORDER — ONDANSETRON 4 MG/1
4 TABLET, ORALLY DISINTEGRATING ORAL EVERY 8 HOURS PRN
Status: DISCONTINUED | OUTPATIENT
Start: 2023-02-17 | End: 2023-02-18

## 2023-02-17 RX ORDER — ACETAMINOPHEN 650 MG/1
650 SUPPOSITORY RECTAL EVERY 6 HOURS PRN
Status: DISCONTINUED | OUTPATIENT
Start: 2023-02-17 | End: 2023-02-20 | Stop reason: HOSPADM

## 2023-02-17 RX ORDER — HYDRALAZINE HYDROCHLORIDE 20 MG/ML
10 INJECTION INTRAMUSCULAR; INTRAVENOUS EVERY 4 HOURS PRN
Status: DISCONTINUED | OUTPATIENT
Start: 2023-02-17 | End: 2023-02-20 | Stop reason: HOSPADM

## 2023-02-17 RX ORDER — ATENOLOL 50 MG/1
100 TABLET ORAL ONCE
Status: COMPLETED | OUTPATIENT
Start: 2023-02-17 | End: 2023-02-17

## 2023-02-17 RX ORDER — 0.9 % SODIUM CHLORIDE 0.9 %
30 INTRAVENOUS SOLUTION INTRAVENOUS ONCE
Status: COMPLETED | OUTPATIENT
Start: 2023-02-17 | End: 2023-02-17

## 2023-02-17 RX ORDER — LABETALOL HYDROCHLORIDE 5 MG/ML
10 INJECTION, SOLUTION INTRAVENOUS ONCE
Status: DISCONTINUED | OUTPATIENT
Start: 2023-02-17 | End: 2023-02-17

## 2023-02-17 RX ORDER — ATENOLOL 50 MG/1
100 TABLET ORAL DAILY
Status: DISCONTINUED | OUTPATIENT
Start: 2023-02-17 | End: 2023-02-20 | Stop reason: HOSPADM

## 2023-02-17 RX ADMIN — SODIUM CHLORIDE 2259 ML: 9 INJECTION, SOLUTION INTRAVENOUS at 11:47

## 2023-02-17 RX ADMIN — FUROSEMIDE 40 MG: 10 INJECTION, SOLUTION INTRAMUSCULAR; INTRAVENOUS at 15:24

## 2023-02-17 RX ADMIN — IOPAMIDOL 100 ML: 755 INJECTION, SOLUTION INTRAVENOUS at 12:42

## 2023-02-17 RX ADMIN — IPRATROPIUM BROMIDE AND ALBUTEROL SULFATE 3 ML: .5; 2.5 SOLUTION RESPIRATORY (INHALATION) at 20:23

## 2023-02-17 RX ADMIN — ONDANSETRON 4 MG: 2 INJECTION INTRAMUSCULAR; INTRAVENOUS at 15:16

## 2023-02-17 RX ADMIN — ATENOLOL 100 MG: 50 TABLET ORAL at 13:08

## 2023-02-17 RX ADMIN — GABAPENTIN 600 MG: 300 CAPSULE ORAL at 20:55

## 2023-02-17 RX ADMIN — MORPHINE SULFATE 4 MG: 4 INJECTION, SOLUTION INTRAMUSCULAR; INTRAVENOUS at 15:16

## 2023-02-17 RX ADMIN — IPRATROPIUM BROMIDE AND ALBUTEROL SULFATE 1 AMPULE: .5; 2.5 SOLUTION RESPIRATORY (INHALATION) at 11:43

## 2023-02-17 RX ADMIN — DILTIAZEM HYDROCHLORIDE 60 MG: 60 TABLET, FILM COATED ORAL at 23:42

## 2023-02-17 RX ADMIN — Medication 10 ML: at 20:47

## 2023-02-17 RX ADMIN — AZITHROMYCIN MONOHYDRATE 500 MG: 250 TABLET ORAL at 18:39

## 2023-02-17 RX ADMIN — HYDRALAZINE HYDROCHLORIDE 10 MG: 20 INJECTION INTRAMUSCULAR; INTRAVENOUS at 20:47

## 2023-02-17 RX ADMIN — GUAIFENESIN 600 MG: 600 TABLET, EXTENDED RELEASE ORAL at 20:47

## 2023-02-17 RX ADMIN — IPRATROPIUM BROMIDE AND ALBUTEROL SULFATE 1 AMPULE: 2.5; .5 SOLUTION RESPIRATORY (INHALATION) at 11:43

## 2023-02-17 RX ADMIN — CEFTRIAXONE SODIUM 1000 MG: 1 INJECTION, POWDER, FOR SOLUTION INTRAMUSCULAR; INTRAVENOUS at 15:39

## 2023-02-17 RX ADMIN — NITROGLYCERIN 1 INCH: 20 OINTMENT TOPICAL at 15:19

## 2023-02-17 RX ADMIN — TAMSULOSIN HYDROCHLORIDE 0.4 MG: 0.4 CAPSULE ORAL at 20:47

## 2023-02-17 RX ADMIN — LEVALBUTEROL 1.25 MG: 1.25 SOLUTION, CONCENTRATE RESPIRATORY (INHALATION) at 11:43

## 2023-02-17 RX ADMIN — ENOXAPARIN SODIUM 150 MG: 150 INJECTION SUBCUTANEOUS at 20:47

## 2023-02-17 RX ADMIN — ACETAMINOPHEN 650 MG: 325 TABLET ORAL at 20:55

## 2023-02-17 RX ADMIN — DILTIAZEM HYDROCHLORIDE 60 MG: 60 TABLET, FILM COATED ORAL at 18:39

## 2023-02-17 ASSESSMENT — PAIN SCALES - GENERAL
PAINLEVEL_OUTOF10: 2
PAINLEVEL_OUTOF10: 5
PAINLEVEL_OUTOF10: 3
PAINLEVEL_OUTOF10: 6

## 2023-02-17 ASSESSMENT — PAIN - FUNCTIONAL ASSESSMENT
PAIN_FUNCTIONAL_ASSESSMENT: 0-10
PAIN_FUNCTIONAL_ASSESSMENT: ACTIVITIES ARE NOT PREVENTED
PAIN_FUNCTIONAL_ASSESSMENT: NONE - DENIES PAIN

## 2023-02-17 ASSESSMENT — PAIN DESCRIPTION - PAIN TYPE: TYPE: CHRONIC PAIN

## 2023-02-17 ASSESSMENT — PAIN DESCRIPTION - ONSET: ONSET: ON-GOING

## 2023-02-17 ASSESSMENT — LIFESTYLE VARIABLES
HOW OFTEN DO YOU HAVE A DRINK CONTAINING ALCOHOL: NEVER
HOW MANY STANDARD DRINKS CONTAINING ALCOHOL DO YOU HAVE ON A TYPICAL DAY: PATIENT DOES NOT DRINK

## 2023-02-17 ASSESSMENT — PAIN DESCRIPTION - LOCATION
LOCATION: HEAD
LOCATION: CHEST;BACK
LOCATION: BACK
LOCATION: BACK

## 2023-02-17 ASSESSMENT — PAIN DESCRIPTION - DESCRIPTORS: DESCRIPTORS: DISCOMFORT

## 2023-02-17 ASSESSMENT — PAIN DESCRIPTION - FREQUENCY: FREQUENCY: CONTINUOUS

## 2023-02-17 ASSESSMENT — PAIN DESCRIPTION - ORIENTATION: ORIENTATION: LEFT;LOWER

## 2023-02-17 NOTE — H&P
Hospital Medicine History & Physical      PCP: Ancelmo Ramos, APRN - CNP    Date of Admission: 2/17/2023    Date of Service: Pt seen/examined on 02/17/23       Chief Complaint:    Chief Complaint   Patient presents with    Shortness of Breath     Pt reports 4 day history of cough/congestion with SOB starting last night. Went to PCP today, tested negative for COVID so she referred him to ER for further evaluation. History Of Present Illness: The patient is a 77 y.o. male with PMHx as below who presented to [facility] ED with complaint of SOB. Progressive SOB and fatigue over several months, significantly worsened over past few days. Primarily with exertion and when lying down. Also with cough and congestion x4 days. Initially productive of red-tinged sputum, now just phlegm. Endorses baseline lower extremity swelling, has not noticed worsening. 20lb weight gain in last month, but states he went off of his low-carb diet which he feels is the cause. Did notice senssation of \"racing\" heart rate yesterday which was new for him. Otherwise he denies fever, chills, nausea, vomiting, abdominal pain, or chest pain.      Past Medical History:        Diagnosis Date    Anxiety     Asthma     BPH     prostate 5/05,     Dermatitis due to sun     severe    Hearing loss     decreased hearing left ear    Hyperlipidemia     Hypertension     pos GXT 3/01, neg GXT 3/07    Narcolepsy     Osteoarthritis     Sleep apnea     bipap    Superficial peroneal nerve neuropathy     bilateral       Past Surgical History:        Procedure Laterality Date    APPENDECTOMY      CHOLECYSTECTOMY      COLONOSCOPY  12/19/11    KNEE SURGERY Right 04/22/2016    Rt total knee    OTHER SURGICAL HISTORY Right 09/01/2016    RIGHT TOTAL SHOULDER REPLACEMENT          SHOULDER ARTHROPLASTY  6/19/2012    Lt total shoulder arthroplasty    TOTAL KNEE ARTHROPLASTY Left 07/19/2016       Medications Prior to Admission:    Prior to Admission medications Medication Sig Start Date End Date Taking? Authorizing Provider   DULoxetine (CYMBALTA) 60 MG extended release capsule TAKE 1 CAPSULE EVERY DAY 11/28/22   JASEN Headley CNP   gabapentin (NEURONTIN) 300 MG capsule TAKE 2 CAPSULES THREE TIMES DAILY 11/28/22 2/28/23  JASEN Headley CNP   losartan (COZAAR) 100 MG tablet TAKE 1 TABLET EVERY DAY 11/28/22   JASEN Headley CNP   albuterol sulfate HFA (VENTOLIN HFA) 108 (90 Base) MCG/ACT inhaler Inhale 2 puffs into the lungs 4 times daily as needed for Wheezing 9/27/22   JASEN Headley CNP   ipratropium-albuterol (DUONEB) 0.5-2.5 (3) MG/3ML SOLN nebulizer solution Inhale 3 mLs into the lungs every 4 hours 9/27/22   JASEN Headley CNP   sulindac (CLINORIL) 200 MG tablet TAKE 1 TABLET EVERY DAY 9/8/22   JASEN Headley CNP   hydroCHLOROthiazide (MICROZIDE) 12.5 MG capsule TAKE 1 CAPSULE EVERY DAY 9/8/22   JASEN Headley CNP   tamsulosin (FLOMAX) 0.4 MG capsule TAKE 1 CAPSULE TWICE DAILY 8/17/22   JASEN Headley CNP   atorvastatin (LIPITOR) 20 MG tablet TAKE 1 TABLET EVERY DAY 8/5/22   JASEN Headley CNP   atenolol (TENORMIN) 100 MG tablet TAKE 1 TABLET EVERY DAY 8/5/22   JASEN Headley CNP   sildenafil (VIAGRA) 50 MG tablet Take 50 mg by mouth as needed for Erectile Dysfunction    Historical Provider, MD   Handicap Placard MISC by Does not apply route 12/2/21   JASEN Headley CNP   Multiple Vitamin (THERA) TABS Take 1 tablet by mouth daily. Historical Provider, MD KRIS HUNG Take  by mouth daily. Historical Provider, MD       Allergies:  Celebrex [celecoxib], Elavil [amitriptyline hcl], and Topamax    Social History:  The patient currently lives at home     TOBACCO:   reports that he quit smoking about 24 years ago. His smoking use included cigarettes. He has a 96.00 pack-year smoking history. He has never used smokeless tobacco.  ETOH:   reports no history of alcohol use.       Family History: Positive as follows:        Problem Relation Age of Onset    Cancer Mother         breast    High Blood Pressure Mother     Arthritis Father     Depression Father     Hearing Loss Father     Heart Disease Father     Stroke Father     Vision Loss Father        REVIEW OF SYSTEMS:     Constitutional: Negative for fever   HENT: Negative for sore throat   Eyes: Negative for redness   Respiratory: +dyspnea, +cough   Cardiovascular: Negative for chest pain   Gastrointestinal: Negative for vomiting, diarrhea   Genitourinary: Negative for hematuria   Musculoskeletal: Negative for arthralgias   Skin: Negative for rash   Neurological: Negative for syncope   Hematological: Negative for adenopathy   Psychiatric/Behavorial: Negative for anxiety    PHYSICAL EXAM:    BP (!) 156/116   Pulse 99   Temp 98.2 °F (36.8 °C) (Oral)   Resp 15   Ht 5' 11\" (1.803 m)   Wt (!) 331 lb 8 oz (150.4 kg)   SpO2 94%   BMI 46.23 kg/m²   Gen: No distress. Alert. Obese. Eyes: PERRL. No sclera icterus. No conjunctival injection. Neck: No JVD. No Carotid Bruit. Trachea midline. Resp: No accessory muscle use. No crackles. No rhonchi. +Diffuse wheezing  CV: Irregularly irregular rhythm. No murmur. No rub. 1+ BLE edema  Peripheral Pulses: +2 palpable, equal bilaterally   GI: Non-tender. Non-distended. No masses. No organomegaly. Normal bowel sounds. No hernia. Skin: Warm and dry. No nodule on exposed extremities. No rash on exposed extremities. M/S: No cyanosis. No joint deformity. No clubbing. Neuro: Awake. Grossly nonfocal    Psych: Oriented x 3. No anxiety or agitation.      CBC:   Recent Labs     02/17/23  1055   WBC 6.3   HGB 13.4*   HCT 40.4*   MCV 89.2   *     BMP:   Recent Labs     02/17/23  1055      K 4.9      CO2 24   BUN 18   CREATININE 0.9     LIVER PROFILE:   Recent Labs     02/17/23  1055   AST 27   ALT 16   BILITOT 1.8*   ALKPHOS 89     UA:  Recent Labs     02/17/23  1132   COLORU Yellow   PHUR 7.0   WBCUA 0-2   RBCUA 0-2   CLARITYU Clear   SPECGRAV <=1.005   LEUKOCYTESUR Negative   UROBILINOGEN 1.0   BILIRUBINUR Negative   BLOODU Negative   GLUCOSEU Negative          CARDIAC ENZYMES  Recent Labs     02/17/23  1055 02/17/23  1514   TROPONINI <0.01 <0.01       U/A:    Lab Results   Component Value Date/Time    NITRITE neg 01/05/2016 12:42 PM    COLORU Yellow 02/17/2023 11:32 AM    WBCUA 0-2 02/17/2023 11:32 AM    RBCUA 0-2 02/17/2023 11:32 AM    CLARITYU Clear 02/17/2023 11:32 AM    SPECGRAV <=1.005 02/17/2023 11:32 AM    LEUKOCYTESUR Negative 02/17/2023 11:32 AM    BLOODU Negative 02/17/2023 11:32 AM    GLUCOSEU Negative 02/17/2023 11:32 AM       CULTURES  Covid/influenza not detected   Blood cultures x2 pending  Respiratory culture pending   Rapid RSV pending   Strep A screen negative  Strep B screen pending     EKG:    Atrial fibrillation with rapid ventricular responseRight bundle branch blockAbnormal ECGWhen compared with ECG of 24-AUG-2016 11:51,Atrial fibrillation has replaced Sinus rhythmVent. rate has increased BY  57 BPMRight bundle branch block is now Present    RADIOLOGY  CT CHEST PULMONARY EMBOLISM W CONTRAST   Final Result   No evidence of pulmonary embolism. Numerous small ground-glass nodular opacities are noted throughout the   bilateral lower lobes likely reflecting an infectious/inflammatory process. Recommend follow-up chest CT in 3 months to ensure resolution. Enlarged thyroid. Recommend further evaluation with non emergent thyroid   ultrasound. XR CHEST PORTABLE   Final Result   Cardiomegaly and vascular congestion without evidence of interstitial edema. No confluent airspace consolidation.              ASSESSMENT/PLAN:  #New onset afib RVR  -cardiology consulted   -with initial rates in 120s --> improved with home dose atenolol  -trop <0.01 x2, trending   -TSH pending   -echo pending   -start full dose lovenox  -start oral cardizem, dosing per cardiology  -continuous tele    [id number] Score for Atrial Fibrillation Stroke Risk       Risk   Factors  Component Value   C CHF No 0   H HTN Yes 1   A2 Age >= 76 No,  (67 y.o.) 0   D DM No 0   S2 Prior Stroke/TIA No 0   V Vascular Disease No 0   A Age 74-69 Yes,  (67 y.o.) 1   Sc Sex male 0    [id number]  Score  2   Score last updated 2/17/23 3:11 PM EST      #Acute CHF   -BNP 4k  -no prior echo, pending   -holding daily hctz and diuresing with lasix 40 daily  -low Na diet, strict I&Os, daily weights  -cardiology following    #CAP, likely 2/2 gram positive organism  -CT chest with ground-glass nodular opacities   -procal negative   -sputum cultures pending   -mucinex and prn tessalon  -duonebs and albuterol   -rocephin and zithromax    #Enlarged thyroid  -seen on CT  -non-emergent thyroid US recommended  -TSH pending    #Thrombocytopenia  -platelet count 846  -monitor with anticoagulation    #HTN  -elevated on admission  -continue atenolol and losartan    #HLD  -continue statin    #Asthma  -continue duonebs and albuterol  -will hold off on steroid for now as acute illness more likely cardiac in nature    #NICOLE  -BiPAP nightly     #BPH  -continue flomax    #Neuropathy  -continue cymbalta and gabapentin      DVT Prophylaxis: Lovenox  Diet: No diet orders on file  Code Status: Prior    Mariluz Sánchez  2/17/2023  4:24 PM

## 2023-02-17 NOTE — CONSULTS
[facility]  [phone]        Reason for Consultation/Chief Complaint: \"I have been having SOB, congestion, chest tighness. \"  Consulted for CHF vs. PNA, AFib    History of Present Illness:    Lord Degroot is a 77 y.o. patient who presented to [facility] with complaints of SOB. He has a PMH of HTN, HLD, Sleep Apnea, Anxiety, and BPH. No known cardiac history. EKG 8/24/16 SB 54 bpm.  No additional cardiac testing in chart. Mr. Kenya Kirk now presents with complaints of cough/congestions for 4 days and worsening ANGULO. Also c/o chest tightness with SOB and cough with occasional reddish phlegm. He c/o worse SOB laying down and \"racing heart beat\" yesterday. He has baseline LE edema. He had virtual visit with PCP and then went to PCP office today for evaluation and was Covid Negative. He was found to be tachycardic and low oxygen saturation and referred to ER. Admitting EKG AFib  bpm, RBBB (SB in 8/16). Admitting CXR Cardiomegaly and vascular congestion without evidence of interstitial edema. Admitting CT Chest negative pulmonary embolism Numerous small ground-glass nodular opacities are noted throughout the bilateral lower lobes likely reflecting an infectious/inflammatory process. Enlarged thyroid. Bonita <0.01, Pro-BNP 4870, normal renal fcn and CBC. Platelets 809. He received 40mg IV lasix and atenolol 100mg po x 1. Patient with no complaints of dizziness, increased edema, or PND. I have been asked to provide consultation regarding further management and testing. Past Medical History:   has a past medical history of Anxiety, Asthma, BPH, Dermatitis due to sun, Hearing loss, Hyperlipidemia, Hypertension, Narcolepsy, Osteoarthritis, Sleep apnea, and Superficial peroneal nerve neuropathy. Surgical History:   has a past surgical history that includes Cholecystectomy; Appendectomy; Colonoscopy (12/19/11);  Total shoulder arthroplasty (6/19/2012); knee surgery (Right, 04/22/2016); Total knee arthroplasty (Left, 07/19/2016); and other surgical history (Right, 09/01/2016). Social History:   reports that he quit smoking about 24 years ago. His smoking use included cigarettes. He has a 96.00 pack-year smoking history. He has never used smokeless tobacco. He reports that he does not drink alcohol and does not use drugs. Family History:  family history includes Arthritis in his father; Cancer in his mother; Depression in his father; Hearing Loss in his father; Heart Disease in his father; High Blood Pressure in his mother; Stroke in his father; Vision Loss in his father. Home Medications:  Were reviewed and are listed in nursing record. and/or listed below  Prior to Admission medications    Medication Sig Start Date End Date Taking?  Authorizing Provider   DULoxetine (CYMBALTA) 60 MG extended release capsule TAKE 1 CAPSULE EVERY DAY 11/28/22   Kristian Gut, APRN - CNP   gabapentin (NEURONTIN) 300 MG capsule TAKE 2 CAPSULES THREE TIMES DAILY 11/28/22 2/28/23  Kristian Gut, APRN - CNP   losartan (COZAAR) 100 MG tablet TAKE 1 TABLET EVERY DAY 11/28/22   Kristian Gut, APRN - CNP   albuterol sulfate HFA (VENTOLIN HFA) 108 (90 Base) MCG/ACT inhaler Inhale 2 puffs into the lungs 4 times daily as needed for Wheezing 9/27/22   Kristian Gut, APRN - CNP   ipratropium-albuterol (DUONEB) 0.5-2.5 (3) MG/3ML SOLN nebulizer solution Inhale 3 mLs into the lungs every 4 hours 9/27/22   Kristian Gut, APRN - CNP   sulindac (CLINORIL) 200 MG tablet TAKE 1 TABLET EVERY DAY 9/8/22   Kristian Gut, APRN - CNP   hydroCHLOROthiazide (MICROZIDE) 12.5 MG capsule TAKE 1 CAPSULE EVERY DAY 9/8/22   Kristian Gut, APRN - CNP   tamsulosin (FLOMAX) 0.4 MG capsule TAKE 1 CAPSULE TWICE DAILY 8/17/22   Kristian Gut, APRN - CNP   atorvastatin (LIPITOR) 20 MG tablet TAKE 1 TABLET EVERY DAY 8/5/22   [facility], APRN - CNP   atenolol (TENORMIN) 100 MG tablet TAKE 1 TABLET EVERY DAY 8/5/22   Kristian Gut, APRN - CNP   sildenafil (VIAGRA) 50 MG tablet Take 50 mg by mouth as needed for Erectile Dysfunction    Historical Provider, MD   Handicap Placard MISC by Does not apply route 12/2/21   JASEN London CNP   Multiple Vitamin (THERA) TABS Take 1 tablet by mouth daily.      Historical Provider, MD KRSI HUNG Take  by mouth daily.    Historical Provider, MD        Allergies:  Celebrex [celecoxib], Elavil [amitriptyline hcl], and Topamax     Review of Systems:   12 point ROS negative in all areas as listed below except as in [place]  Constitutional, EENT, Cardiovascular, pulmonary, GI, , Musculoskeletal, skin, neurological, hematological, endocrine, Psychiatric    Physical Examination:    Vitals:    02/17/23 1301   BP: (!) 170/132   Pulse: (!) 102   Resp: 16   Temp:    SpO2: 90%    Weight: (!) 320 lb (145.2 kg)         General Appearance:  Alert, cooperative, no distress, appears older than stated age   Head:  Normocephalic, without obvious abnormality, atraumatic   Eyes:  PERRL, conjunctiva/corneas clear       Nose: Nares normal, no drainage or sinus tenderness   Throat: Lips, mucosa, and tongue normal   Neck: Supple, symmetrical, trachea midline, no adenopathy, thyroid: not enlarged, symmetric, no tenderness/mass/nodules, no carotid bruit or JVD       Lungs:   Soft expiratory wheezing scattered   Chest Wall:  No tenderness or deformity   Heart:  +irregularly irregular; S1, S2 normal, no murmur, rub or gallop   Abdomen:   Soft, non-tender, bowel sounds active all four quadrants,  no masses, no organomegaly           Extremities: Extremities 1+ BLE edema   Pulses: 2+ and symmetric   Skin: Skin color, texture, turgor normal, no rashes or lesions   Pysch: Normal mood and affect   Neurologic: Normal gross motor and sensory exam.         Labs  CBC:   Lab Results   Component Value Date/Time    WBC 6.3 02/17/2023 10:55 AM    RBC 4.53 02/17/2023 10:55 AM    HGB 13.4 02/17/2023 10:55 AM    HCT 40.4 02/17/2023 10:55 AM    MCV 89.2 02/17/2023  10:55 AM    RDW 14.6 02/17/2023 10:55 AM     02/17/2023 10:55 AM     CMP:    Lab Results   Component Value Date/Time     02/17/2023 10:55 AM    K 4.9 02/17/2023 10:55 AM     02/17/2023 10:55 AM    CO2 24 02/17/2023 10:55 AM    BUN 18 02/17/2023 10:55 AM    CREATININE 0.9 02/17/2023 10:55 AM    GFRAA >60 12/07/2021 08:01 AM    GFRAA >60 06/01/2013 10:21 AM    AGRATIO 1.6 02/17/2023 10:55 AM    LABGLOM >60 02/17/2023 10:55 AM    GLUCOSE 119 02/17/2023 10:55 AM    PROT 7.2 02/17/2023 10:55 AM    CALCIUM 8.4 02/17/2023 10:55 AM    BILITOT 1.8 02/17/2023 10:55 AM    ALKPHOS 89 02/17/2023 10:55 AM    AST 27 02/17/2023 10:55 AM    ALT 16 02/17/2023 10:55 AM     PT/INR:  No results found for: PTINR  Lab Results   Component Value Date    TROPONINI <0.01 02/17/2023       EKG:  I have reviewed EKG with the following interpretation:  Impression:  See HPI    [id number] Score for Atrial Fibrillation Stroke Risk   Risk   Factors  Component Value   C CHF No 0   H HTN Yes 1   A2 Age >= 76 No,  (67 y.o.) 0   D DM No 0   S2 Prior Stroke/TIA No 0   V Vascular Disease No 0   A Age 74-69 Yes,  (67 y.o.) 1   Sc Sex male 0    [id number]  Score  2   Score last updated 2/17/23 3:54 PM EST      Assessment:  Ricardo Love is a 77 y.o. patient who presented to [facility] with complaints of SOB. He has a PMH of HTN, HLD, Sleep Apnea, Anxiety, and BPH. No known cardiac history. EKG 8/24/16 SB 54 bpm.  No additional cardiac testing in chart. Mr. Verner Ink now presents with complaints of cough/congestions for 4 days and worsening ANGULO. Also c/o chest tightness with SOB and cough with occasional reddish phlegm. He c/o worse SOB laying down and \"racing heart beat\" yesterday. He has baseline LE edema. He had virtual visit with PCP and then went to PCP office today for evaluation and was Covid Negative. He was found to be tachycardic and low oxygen saturation and referred to ER. Admitting EKG AFib  bpm, RBBB (SB in 8/16). Admitting CXR Cardiomegaly and vascular congestion without evidence of interstitial edema. Admitting CT Chest negative pulmonary embolism Numerous small ground-glass nodular opacities are noted throughout the bilateral lower lobes likely reflecting an infectious/inflammatory process. Enlarged thyroid. Bonita <0.01, Pro-BNP 4870, normal renal fcn and CBC. Platelets 540. He received 40mg IV lasix and atenolol 100mg po x 1. Diagnosis of new-onset atrial fibrillation with likely acute CHF undetermined type, uncontrolled HTN, and new RBBB plus possible respiratory infection in older male with HTN and HLD. Recs:  Start IV lasix 40mg daily for diuresis  Concern for acute CHF from new-onset afib with both conditions potentially harmful and life threatening requiring adequate treatment. Start diltiazem 60mg po q 6 hours for BP and HR control. Note chronic HTN with acute worsening requiring treatment. Continue lovenox full dose for AC. Abx and inhalers per IM team  I recommend ECHO to evaluate LV function and size, wall motion, and valves for any structural abnormalities. Patient Active Problem List   Diagnosis    Arthritis    Anxiety    Mild intermittent asthma without complication    Colon polyp    Testosterone deficiency    left TSR    Impotence due to erectile dysfunction    Essential hypertension    Primary narcolepsy without cataplexy    Benign non-nodular prostatic hyperplasia with lower urinary tract symptoms    Hypercholesteremia    Neuropathy involving both lower extremities    Vitamin D deficiency    Morbid obesity with BMI of 40.0-44.9, adult ([address]    NICOLE treated with BiPAP    Status post total right knee replacement        Thank you for allowing to us to participate in the care or Carri Baptiste. Further evaluation will be based upon the patient's clinical course and testing results.

## 2023-02-17 NOTE — PROGRESS NOTES
Patient here for Covid/flu test which was negative, I took his oxygen sat.  It 90% sitting, patient SOB and struggling to breath, sent him to ER

## 2023-02-17 NOTE — PROGRESS NOTES
FYI sent to hospitalist    \"Hello! Pt wears home CPAP for hx of NICOLE, need order for CPAP please. Also, FYI,  there is no pulm consult placed on this patient, currently only cardiology consult ordered. \"

## 2023-02-17 NOTE — PROGRESS NOTES
4 Eyes Skin Assessment     The patient is being assess for   Admission    I agree that 2 RN's have performed a thorough Head to Toe Skin Assessment on the patient. ALL assessment sites listed below have been assessed. Areas assessed for pressure by both nurses:   [x]   Head, Face, and Ears   [x]   Shoulders, Back, and Chest, Abdomen  [x]   Arms, Elbows, and Hands   [x]   Coccyx, Sacrum, and Ischium  [x]   Legs, Feet, and Heels    No wounds, skin pink, warm, dry. BLE slightly swollen +1, non-pitting. Few scattered bruises. Skin Assessed Under all Medical Devices by both nurses:  O2 device tubing              All Mepilex Borders were peeled back and area peeked at by both nurses:  No: NA  Please list where Mepilex Borders are located:  NA             **SHARE this note so that the co-signing nurse is able to place an eSignature**    Co-signer eSignature: Electronically signed by Pau Borges RN on 2/17/23 at 6:49 PM EST    Does the Patient have Skin Breakdown related to pressure?    NA           Manolo Prevention initiated:  NA   Wound Care Orders initiated:  NA      [facility] nurse consulted for Pressure Injury (Stage 3,4, Unstageable, DTI, NWPT, Complex wounds)and New or Established Ostomies:  NA      Primary Nurse eSignature: Electronically signed by Camilo Hernandes RN on 2/17/23 at 6:34 PM EST

## 2023-02-17 NOTE — ED PROVIDER NOTES
[address] ED  EMERGENCY DEPARTMENT ENCOUNTER        Pt Name: El Prescott  MRN: [id number]  Arm[place] 1955  Date of evaluation: 2/17/2023  Provider: LUCAS Estrada  PCP: JASEN Jack CNP  Note Started: 10:51 AM EST 2/17/23       I have seen and evaluated this patient with my supervising physician Dominga Michel MD.      [facility]       Chief Complaint   Patient presents with    Shortness of Breath     Pt reports 4 day history of cough/congestion with SOB starting last night. Went to PCP today, tested negative for COVID so she referred him to ER for further evaluation. HISTORY OF PRESENT ILLNESS: 1 or more Elements     History from : Patient and Family wife     Limitations to history : None    El Prescott is a 77 y.o. male with past medical history of hearing loss hypertension hyperlipidemia narcolepsy sleep apnea BPH who presents via private vehicle from his home with his wife for evaluation of shortness of breath. He has had 4 days of cough congestion worsening shortness of breath for the last 2 days. Seen by his PCP earlier today had COVID test was negative noted to be tachycardic with low oxygen saturation was referred to the ER for further evaluation. Patient denies chest pain. Denies syncope. Denies nausea vomiting abdominal pain diarrhea. Denies known fever. Notes that he feels the way he feels when he gets pneumonia. Has been using a breathing treatment at home to his knowledge she does not have COPD or asthma. Nursing Notes were all reviewed and agreed with or any disagreements were addressed in the HPI. REVIEW OF SYSTEMS :      Review of Systems    Positives and Pertinent negatives as per HPI.      SURGICAL HISTORY     Past Surgical History:   Procedure Laterality Date    APPENDECTOMY      CHOLECYSTECTOMY      COLONOSCOPY  12/19/11    KNEE SURGERY Right 04/22/2016    Rt total knee    OTHER SURGICAL HISTORY Right 09/01/2016    RIGHT TOTAL SHOULDER REPLACEMENT          SHOULDER ARTHROPLASTY  2012    Lt total shoulder arthroplasty    TOTAL KNEE ARTHROPLASTY Left 2016       CURRENTMEDICATIONS       Previous Medications    ALBUTEROL SULFATE HFA (VENTOLIN HFA) 108 (90 BASE) MCG/ACT INHALER    Inhale 2 puffs into the lungs 4 times daily as needed for Wheezing    ATENOLOL (TENORMIN) 100 MG TABLET    TAKE 1 TABLET EVERY DAY    ATORVASTATIN (LIPITOR) 20 MG TABLET    TAKE 1 TABLET EVERY DAY    DULOXETINE (CYMBALTA) 60 MG EXTENDED RELEASE CAPSULE    TAKE 1 CAPSULE EVERY DAY    GABAPENTIN (NEURONTIN) 300 MG CAPSULE    TAKE 2 CAPSULES THREE TIMES DAILY    HANDICAP PLACARD MISC    by Does not apply route    HYDROCHLOROTHIAZIDE (MICROZIDE) 12.5 MG CAPSULE    TAKE 1 CAPSULE EVERY DAY    IPRATROPIUM-ALBUTEROL (DUONEB) 0.5-2.5 (3) MG/3ML SOLN NEBULIZER SOLUTION    Inhale 3 mLs into the lungs every 4 hours    LOSARTAN (COZAAR) 100 MG TABLET    TAKE 1 TABLET EVERY DAY    MULTIPLE VITAMIN (THERA) TABS    Take 1 tablet by mouth daily. SAW PALMETTO    Take  by mouth daily. SILDENAFIL (VIAGRA) 50 MG TABLET    Take 50 mg by mouth as needed for Erectile Dysfunction    SULINDAC (CLINORIL) 200 MG TABLET    TAKE 1 TABLET EVERY DAY    TAMSULOSIN (FLOMAX) 0.4 MG CAPSULE    TAKE 1 CAPSULE TWICE DAILY       ALLERGIES     Celebrex [celecoxib], Elavil [amitriptyline hcl], and Topamax    FAMILYHISTORY       Family History   Problem Relation Age of Onset    Cancer Mother         breast    High Blood Pressure Mother     Arthritis Father     Depression Father     Hearing Loss Father     Heart Disease Father     Stroke Father     Vision Loss Father         SOCIAL HISTORY       Social History     Tobacco Use    Smoking status: Former     Packs/day: 3.00     Years: 32.00     Pack years: 96.00     Types: Cigarettes     Quit date: 1999     Years since quittin.1    Smokeless tobacco: Never   Substance Use Topics    Alcohol use: No    Drug use:  No SCREENINGS        Arslan Coma Scale  Eye Opening: Spontaneous  Best Verbal Response: Oriented  Best Motor Response: Obeys commands  Arslan Coma Scale Score: 15                CIWA Assessment  BP: (!) 170/132  Heart Rate: (!) 102           PHYSICAL EXAM  1 or more Elements     ED Triage Vitals [23 1039]   BP Temp Temp Source Heart Rate Resp SpO2 Height Weight   (!) 168/111 97.5 °F (36.4 °C) Oral (!) 120 20 92 % 5' 11\" (1.803 m) (!) 320 lb (145.2 kg)       Physical Exam  Vitals and nursing note reviewed. Constitutional:       General: He is not in acute distress. Appearance: He is well-developed. He is obese. He is not ill-appearing, toxic-appearing or diaphoretic. HENT:      Head: Normocephalic and atraumatic. Right Ear: External ear normal.      Left Ear: External ear normal.      Nose: Nose normal.      Mouth/Throat:      Mouth: Mucous membranes are dry. Pharynx: Oropharynx is clear. Eyes:      General:         Right eye: No discharge. Left eye: No discharge. Extraocular Movements: Extraocular movements intact. Conjunctiva/sclera: Conjunctivae normal.      Pupils: Pupils are equal, round, and reactive to light. Cardiovascular:      Rate and Rhythm: Regular rhythm. Tachycardia present. Pulses: Normal pulses. Heart sounds: Normal heart sounds. No murmur heard. No friction rub. No gallop. Comments: No LE redness,warmth, tenderness, asymmetry       Pulmonary:      Effort: Pulmonary effort is normal. No respiratory distress. Breath sounds: Wheezing and rhonchi present. No rales. Abdominal:      Palpations: Abdomen is soft. Tenderness: There is no abdominal tenderness. There is no guarding. Musculoskeletal:      Cervical back: Normal range of motion and neck supple. No rigidity. Right lower le+ Edema present. Left lower le+ Edema present. Skin:     General: Skin is warm and dry.       Capillary Refill: Capillary refill takes less than 2 seconds. Neurological:      General: No focal deficit present. Mental Status: He is alert and oriented to person, place, and time. Psychiatric:         Behavior: Behavior normal.           DIAGNOSTIC RESULTS   LABS:    Labs Reviewed   CBC WITH AUTO DIFFERENTIAL - Abnormal; Notable for the following components:       Result Value    Hemoglobin 13.4 (*)     Hematocrit 40.4 (*)     Platelets 416 (*)     Lymphocytes Absolute 0.7 (*)     All other components within normal limits   COMPREHENSIVE METABOLIC PANEL W/ REFLEX TO MG FOR LOW K - Abnormal; Notable for the following components:    Glucose 119 (*)     Total Bilirubin 1.8 (*)     All other components within normal limits   BLOOD GAS, VENOUS - Abnormal; Notable for the following components:    pO2, Girma 86.5 (*)     Carboxyhemoglobin 3.9 (*)     All other components within normal limits   BRAIN NATRIURETIC PEPTIDE - Abnormal; Notable for the following components:    Pro-BNP 4,870 (*)     All other components within normal limits   URINALYSIS WITH REFLEX TO CULTURE - Abnormal; Notable for the following components:    Protein, UA 30 (*)     All other components within normal limits   COVID-19 & INFLUENZA COMBO   STREP SCREEN GROUP A THROAT   RSV RAPID ANTIGEN   CULTURE, RESPIRATORY   CULTURE, BETA STREP CONFIRM PLATES   CULTURE, BLOOD 1   CULTURE, BLOOD 2   LACTATE, SEPSIS   TROPONIN   MICROSCOPIC URINALYSIS   PROCALCITONIN   MAGNESIUM   LACTIC ACID       When ordered only abnormal lab results are displayed. All other labs were within normal range or not returned as of this dictation. EKG: When ordered, EKG's are interpreted by the Emergency Department Physician in the absence of a cardiologist.  Please see their note for interpretation of EKG.     RADIOLOGY:   Non-plain film images such as CT, Ultrasound and MRI are read by the radiologist. Plain radiographic images are visualized and preliminarily interpreted by the ED Provider with the below findings:        Interpretation per the Radiologist below, if available at the time of this note:    CT CHEST PULMONARY EMBOLISM W CONTRAST   Final Result   No evidence of pulmonary embolism. Numerous small ground-glass nodular opacities are noted throughout the   bilateral lower lobes likely reflecting an infectious/inflammatory process. Recommend follow-up chest CT in 3 months to ensure resolution. Enlarged thyroid. Recommend further evaluation with non emergent thyroid   ultrasound. XR CHEST PORTABLE   Final Result   Cardiomegaly and vascular congestion without evidence of interstitial edema. No confluent airspace consolidation. No results found. No results found. PROCEDURES   Unless otherwise noted below, none     Procedures    CRITICAL CARE TIME (.cctime)   I personally saw the patient and independently provided 42 minutes of non-concurrent critical care out of the total shared critical care time provided. This excludes time spent doing separately billable procedures. This includes time at the bedside, data interpretation, medication management, obtaining critical history from collateral sources if the patient is unable to provide it directly, and physician consultation. Specifics of interventions taken and potentially life-threatening diagnostic considerations are listed above in the medical decision making. If this was a shared visit with a Physician the time in this attestation is non-concurrent critical care time out of the total shared critical care time provided by the Physician and myself. PAST MEDICAL HISTORY      has a past medical history of Anxiety, Asthma, BPH, Dermatitis due to sun, Hearing loss, Hyperlipidemia, Hypertension, Narcolepsy, Osteoarthritis, Sleep apnea, and Superficial peroneal nerve neuropathy.      Chronic Conditions affecting Care: above    EMERGENCY DEPARTMENT COURSE and DIFFERENTIAL DIAGNOSIS/MDM:   Vitals:    Vitals: 02/17/23 1039 02/17/23 1203 02/17/23 1301   BP: (!) 168/111 (!) 165/129 (!) 170/132   Pulse: (!) 120 (!) 125 (!) 102   Resp: 20 18 16   Temp: 97.5 °F (36.4 °C)     TempSrc: Oral     SpO2: 92% 92% 90%   Weight: (!) 320 lb (145.2 kg)     Height: 5' 11\" (1.803 m)         Patient was given the following medications:  Medications   0.9 % sodium chloride bolus ( IntraVENous Rate/Dose Change 2/17/23 1230)   cefTRIAXone (ROCEPHIN) 1,000 mg in sodium chloride 0.9 % 50 mL IVPB (mini-bag) (has no administration in time range)     And   azithromycin (ZITHROMAX) 500 mg in 250 mL addavial (has no administration in time range)   morphine sulfate (PF) injection 4 mg (has no administration in time range)   furosemide (LASIX) injection 40 mg (has no administration in time range)   ondansetron (ZOFRAN) injection 4 mg (has no administration in time range)   nitroglycerin (NITRO-BID) 2 % ointment 1 inch (has no administration in time range)   levalbuterol (XOPENEX) nebulizer solution 1.25 mg (1.25 mg Nebulization Given 2/17/23 1143)   ipratropium-albuterol (DUONEB) nebulizer solution 1 ampule (1 ampule Inhalation Given 2/17/23 1143)   iopamidol (ISOVUE-370) 76 % injection 100 mL (100 mLs IntraVENous Given 2/17/23 1242)   atenolol (TENORMIN) tablet 100 mg (100 mg Oral Given 2/17/23 1308)             Is this patient to be included in the SEP-1 Core Measure due to severe sepsis or septic shock? No   Exclusion criteria - the patient is NOT to be included for SEP-1 Core Measure due to:  May have criteria for sepsis, but does not meet criteria for severe sepsis or septic shock    CONSULTS: (Who and What was discussed)  IP CONSULT TO CARDIOLOGY  Discussion with Other Profesionals : Admitting Team    Attending spoke with cards             CC/HPI Summary, DDx, ED Course, and Reassessment: Patient seen and evaluated. Old records reviewed. Diagnostic testing reviewed and results discussed.       Patient is a very pleasant 73-year-old male who presents for evaluation of 4-day history of shortness of breath. Patient seen and evaluated by myself and my attending. History obtained from patient. Patient presents with 4-day history of cough congestion, dyspnea starting a couple days ago. He was seen prior at his PCP and was noted to have low oxygen and high heart rate and therefore was transferred to the ER for further evaluation. On my initial examination patient is tachycardic blood pressure mildly elevated he is mildly tachypneic but he is satting appropriately on room air. He has wheezes and rhonchorous breath sounds throughout. He was given a breathing treatment he will steroids held at this time as patient has no known history of COPD although I do have concern that he could have this pathology. I reassessed the patient after breathing treatment he has cleared rhonchorous sounds continues to have mild inspiratory and expiratory wheezes throughout I do not appreciate any crackles. Differential includes underlying COPD, pneumonia, CHF. Patient does not appear to be overtly fluid overloaded I do have concern that he could have a pneumonia given his constellation of URI symptoms preceding this presentation today. Patient had work-up and he had CT. Chest x-ray portable was obtained which reveals cardiomegaly and vascular congestion without evidence of interstitial edema no confluent airspace consolidation I do believe the and agree with this interpretation. Patient was tested for COVID flu RSV, sputum culture sent. He is negative for all of the above, strep is negative. VBG was obtained, nonobstructive pattern. Metabolic panel is remarkable for mild hyperglycemia mild elevation in total bilirubin, will continue to monitor I do not believe that the total bilirubin is currently contributing to his current presentation. CBC reveals mild chronic anemia. His troponin is negative I have no concern for ACS.   BNP is elevated I do not have any prior to compare to, concern for right heart strain potentially however patient is currently fluid responsive to the IV fluids and is not requiring supplemental oxygen at this time. He had CTPA study which is negative for acute PE he has numerous small groundglass nodular opacities noted throughout bilateral lower lobes representing likely infectious process and he also has an enlarged thyroid. Cardiomegaly appreciated on chest x-ray. I reevaluated the patient, he will be started on broad-spectrum antibiotics, Rocephin and azithromycin for presumed pneumonia. I did give patient his home dose of beta-blocker, his EKG was shown actually that he is not in atrial fibrillation type of rhythm, he is no longer with rapid ventricular response he also has a new right bundle. Patient is going to require admission for continued work-up of his dyspnea whether it be related to underlying COPD process, new atrial fibrillation, new CHF or simply just has pneumonia. Disposition Considerations (include 1 Tests not done, Shared Decision Making, Pt Expectation of Test or Tx.): All information including ED workup, results, treatment, diagnosis has been reviewed and discussed with ED attending physician and directly discussed with Hospitalist who is the admitting physician. Pt will be admitted in stable condition. Pt advised of admission and is in full agreement. I am the Primary Clinician of Record. FINAL IMPRESSION      1. Atrial fibrillation, unspecified type ([address]    2. Shortness of breath    3. Wheezing    4. Pneumonia due to infectious organism, unspecified laterality, unspecified part of lung    5. Cardiomegaly          DISPOSITION/PLAN     DISPOSITION Decision To Admit 02/17/2023 02:09:52 PM      PATIENT REFERRED TO:  No follow-up provider specified.     DISCHARGE MEDICATIONS:  New Prescriptions    No medications on file       DISCONTINUED MEDICATIONS:  Discontinued Medications    No medications on file (Please note that portions of this note were completed with a voice recognition program.  Efforts were made to edit the dictations but occasionally words are mis-transcribed.)    Verl Dubin, [place] (electronically signed)            Verl Dubin, [place]  02/18/23 0183

## 2023-02-17 NOTE — ED NOTES
7180 consult sent to [facility]  [id number]  returned the consult and spoke with Jefe Schaffer [facility]  02/17/23 6615

## 2023-02-17 NOTE — PROGRESS NOTES
I have recommended that this patient have a bed alarm in place for history of falls but he declines at this time. I have discussed the risks and benefits of this bed alarm with him. The patient verbalizes understanding.

## 2023-02-17 NOTE — ED PROVIDER NOTES
I independently performed a history and physical on Sreekanth Guillen. All diagnostic, treatment, and disposition decisions were made by myself in conjunction with the advanced practice provider. I personally saw the patient and performed a substantive portion of the visit including all aspects of the medical decision making. For further details of Edi Hernández emergency department encounter, please see LUCAS Kirby's documentation. Patient is a 75-year-old male with cough and congestion for the last 4 days with increasing shortness of breath since yesterday along with left-sided sharp chest pains. He denies any fever. No history of heart failure. He does have a history of asthma and used to smoke but quit years ago. He reports his blood pressure is normally with diastolic in the 52N to 308D but it is slightly higher today than normal.  He denies any unilateral numbness or weakness. No falls or trauma. No significant headache. He did see his primary doctor earlier today and was told to go to the ED for further evaluation. He denies any new leg swelling and does report one of his legs is normally more swollen than the other. No urinary complaints. He denies any history of atrial fibrillation. Physical:   Gen: No acute distress. AOx3. Psych: Normal mood and affect  HEENT: NCAT, PERRL, MMM  Neck: supple  Cardiac: Irregularly irregular rhythm, tachycardia, pulses 2+ in upper extremities, 1+ pitting edema to bilateral lower extremities   Lungs: Bilateral breath sounds present with scattered wheezing and rhonchi throughout, no respiratory distress  Abdomen: soft and nontender with no R/D/G  Neuro: Moving all extremities equally, GCS equals 15      The Ekg interpreted by me shows  atrial fibrillation with a rate of 111  Axis is   Normal  QTc is  within an acceptable range  Intervals and Durations are unremarkable.       ST Segments: nonspecific changes  Significant change from prior EKG dated - 7/11/16  No STEMI, concern for atrial fibrillation today which is new along with new right bundle branch block compared to old EKG    Critical Care Time:    I personally saw the patient and independently provided 30 minutes of non-concurrent critical care out of the total shared critical care time provided. Includes repeat examinations, speaking with consultants, lab and x-ray interpretation, charting, treating for acute CHF exacerbation needing IV Lasix along with nitroglycerin and giving IV labetalol due to concern for hypertensive emergency  Excludes separate billable procedures. Patient at risk for serious decompensation if not treated for this life-threatening condition. I was the primary provider for the patient along with LUCAS Rodriguez. MDM: Patient was evaluated due to worsening cough with congestion over the last 4 days but now with increasing shortness of breath with left-sided sharp chest pain. CT of the chest showed concern for pneumonia but no obvious sign of pulmonary embolism per radiologist.  His blood pressure was significantly elevated although he reports it is chronically elevated at baseline but due to worsening diastolic number, I did ultimately give a dose of IV labetalol. I spoke to cardiology with Dr. Marilia Ortiz and he will see the patient in the hospital.  Troponin was negative and story not concerning for acute coronary syndrome. He had new onset atrial fibrillation which will need further evaluation along with echocardiogram due to concern for CHF exacerbation. He did receive IV Lasix along with nitroglycerin paste in the ED along with IV antibiotics. He is stable for the floor with telemetry and agrees with this plan. I spoke to hospitalist for admission at [phone] and physician assistant [facility] plans to admit.      Silvano Weiner MD  02/17/23 3961

## 2023-02-18 PROBLEM — J45.41 MODERATE PERSISTENT ASTHMA WITH EXACERBATION: Status: ACTIVE | Noted: 2023-02-18

## 2023-02-18 LAB
ANION GAP SERPL CALCULATED.3IONS-SCNC: 14 MMOL/L (ref 3–16)
BASOPHILS ABSOLUTE: 0.1 K/UL (ref 0–0.2)
BASOPHILS RELATIVE PERCENT: 0.7 %
BUN BLDV-MCNC: 18 MG/DL (ref 7–20)
CALCIUM SERPL-MCNC: 8.3 MG/DL (ref 8.3–10.6)
CHLORIDE BLD-SCNC: 99 MMOL/L (ref 99–110)
CO2: 24 MMOL/L (ref 21–32)
CREAT SERPL-MCNC: 0.8 MG/DL (ref 0.8–1.3)
EKG ATRIAL RATE: 102 BPM
EKG DIAGNOSIS: NORMAL
EKG Q-T INTERVAL: 494 MS
EKG QRS DURATION: 158 MS
EKG QTC CALCULATION (BAZETT): 555 MS
EKG R AXIS: -12 DEGREES
EKG T AXIS: -26 DEGREES
EKG VENTRICULAR RATE: 76 BPM
EOSINOPHILS ABSOLUTE: 0 K/UL (ref 0–0.6)
EOSINOPHILS RELATIVE PERCENT: 0.6 %
GFR SERPL CREATININE-BSD FRML MDRD: >60 ML/MIN/{1.73_M2}
GLUCOSE BLD-MCNC: 134 MG/DL (ref 70–99)
HCT VFR BLD CALC: 40.6 % (ref 40.5–52.5)
HEMOGLOBIN: 13.6 G/DL (ref 13.5–17.5)
LV EF: 58 %
LVEF MODALITY: NORMAL
LYMPHOCYTES ABSOLUTE: 0.4 K/UL (ref 1–5.1)
LYMPHOCYTES RELATIVE PERCENT: 5 %
MAGNESIUM: 2 MG/DL (ref 1.8–2.4)
MCH RBC QN AUTO: 29.8 PG (ref 26–34)
MCHC RBC AUTO-ENTMCNC: 33.6 G/DL (ref 31–36)
MCV RBC AUTO: 88.6 FL (ref 80–100)
MONOCYTES ABSOLUTE: 0.3 K/UL (ref 0–1.3)
MONOCYTES RELATIVE PERCENT: 3.9 %
NEUTROPHILS ABSOLUTE: 6.6 K/UL (ref 1.7–7.7)
NEUTROPHILS RELATIVE PERCENT: 89.8 %
PDW BLD-RTO: 14.3 % (ref 12.4–15.4)
PLATELET # BLD: 121 K/UL (ref 135–450)
PMV BLD AUTO: 9.8 FL (ref 5–10.5)
POTASSIUM REFLEX MAGNESIUM: 3.4 MMOL/L (ref 3.5–5.1)
RBC # BLD: 4.58 M/UL (ref 4.2–5.9)
SODIUM BLD-SCNC: 137 MMOL/L (ref 136–145)
WBC # BLD: 7.3 K/UL (ref 4–11)

## 2023-02-18 PROCEDURE — 6360000002 HC RX W HCPCS: Performed by: NURSE PRACTITIONER

## 2023-02-18 PROCEDURE — 2060000000 HC ICU INTERMEDIATE R&B

## 2023-02-18 PROCEDURE — 6360000002 HC RX W HCPCS

## 2023-02-18 PROCEDURE — 6370000000 HC RX 637 (ALT 250 FOR IP): Performed by: INTERNAL MEDICINE

## 2023-02-18 PROCEDURE — 93010 ELECTROCARDIOGRAM REPORT: CPT | Performed by: INTERNAL MEDICINE

## 2023-02-18 PROCEDURE — 80048 BASIC METABOLIC PNL TOTAL CA: CPT

## 2023-02-18 PROCEDURE — 83735 ASSAY OF MAGNESIUM: CPT

## 2023-02-18 PROCEDURE — 94761 N-INVAS EAR/PLS OXIMETRY MLT: CPT

## 2023-02-18 PROCEDURE — 6370000000 HC RX 637 (ALT 250 FOR IP)

## 2023-02-18 PROCEDURE — 99233 SBSQ HOSP IP/OBS HIGH 50: CPT | Performed by: INTERNAL MEDICINE

## 2023-02-18 PROCEDURE — 2580000003 HC RX 258

## 2023-02-18 PROCEDURE — 85025 COMPLETE CBC W/AUTO DIFF WBC: CPT

## 2023-02-18 PROCEDURE — 93005 ELECTROCARDIOGRAM TRACING: CPT | Performed by: INTERNAL MEDICINE

## 2023-02-18 PROCEDURE — 36415 COLL VENOUS BLD VENIPUNCTURE: CPT

## 2023-02-18 PROCEDURE — 2700000000 HC OXYGEN THERAPY PER DAY

## 2023-02-18 PROCEDURE — 93306 TTE W/DOPPLER COMPLETE: CPT

## 2023-02-18 PROCEDURE — 94640 AIRWAY INHALATION TREATMENT: CPT

## 2023-02-18 PROCEDURE — 6360000002 HC RX W HCPCS: Performed by: INTERNAL MEDICINE

## 2023-02-18 PROCEDURE — 94660 CPAP INITIATION&MGMT: CPT

## 2023-02-18 RX ORDER — POTASSIUM CHLORIDE 20 MEQ/1
20 TABLET, EXTENDED RELEASE ORAL 2 TIMES DAILY WITH MEALS
Status: COMPLETED | OUTPATIENT
Start: 2023-02-18 | End: 2023-02-19

## 2023-02-18 RX ORDER — PROCHLORPERAZINE EDISYLATE 5 MG/ML
10 INJECTION INTRAMUSCULAR; INTRAVENOUS EVERY 6 HOURS PRN
Status: DISCONTINUED | OUTPATIENT
Start: 2023-02-18 | End: 2023-02-20 | Stop reason: HOSPADM

## 2023-02-18 RX ORDER — METHYLPREDNISOLONE SODIUM SUCCINATE 40 MG/ML
40 INJECTION, POWDER, LYOPHILIZED, FOR SOLUTION INTRAMUSCULAR; INTRAVENOUS EVERY 12 HOURS
Status: DISCONTINUED | OUTPATIENT
Start: 2023-02-18 | End: 2023-02-20 | Stop reason: HOSPADM

## 2023-02-18 RX ADMIN — DILTIAZEM HYDROCHLORIDE 60 MG: 60 TABLET, FILM COATED ORAL at 11:14

## 2023-02-18 RX ADMIN — IPRATROPIUM BROMIDE AND ALBUTEROL SULFATE 3 ML: .5; 2.5 SOLUTION RESPIRATORY (INHALATION) at 11:50

## 2023-02-18 RX ADMIN — ENOXAPARIN SODIUM 150 MG: 150 INJECTION SUBCUTANEOUS at 09:10

## 2023-02-18 RX ADMIN — DILTIAZEM HYDROCHLORIDE 60 MG: 60 TABLET, FILM COATED ORAL at 23:33

## 2023-02-18 RX ADMIN — ACETAMINOPHEN 650 MG: 325 TABLET ORAL at 08:56

## 2023-02-18 RX ADMIN — FUROSEMIDE 40 MG: 10 INJECTION, SOLUTION INTRAMUSCULAR; INTRAVENOUS at 08:43

## 2023-02-18 RX ADMIN — GUAIFENESIN 600 MG: 600 TABLET, EXTENDED RELEASE ORAL at 08:41

## 2023-02-18 RX ADMIN — ENOXAPARIN SODIUM 150 MG: 150 INJECTION SUBCUTANEOUS at 20:16

## 2023-02-18 RX ADMIN — GABAPENTIN 600 MG: 300 CAPSULE ORAL at 20:16

## 2023-02-18 RX ADMIN — PROCHLORPERAZINE EDISYLATE 10 MG: 5 INJECTION INTRAMUSCULAR; INTRAVENOUS at 03:35

## 2023-02-18 RX ADMIN — POTASSIUM CHLORIDE 20 MEQ: 1500 TABLET, EXTENDED RELEASE ORAL at 16:01

## 2023-02-18 RX ADMIN — AZITHROMYCIN MONOHYDRATE 500 MG: 250 TABLET ORAL at 16:01

## 2023-02-18 RX ADMIN — ATORVASTATIN CALCIUM 20 MG: 10 TABLET, FILM COATED ORAL at 08:41

## 2023-02-18 RX ADMIN — ACETAMINOPHEN 650 MG: 325 TABLET ORAL at 02:53

## 2023-02-18 RX ADMIN — HYDRALAZINE HYDROCHLORIDE 10 MG: 20 INJECTION INTRAMUSCULAR; INTRAVENOUS at 02:53

## 2023-02-18 RX ADMIN — CEFTRIAXONE SODIUM 1000 MG: 1 INJECTION, POWDER, FOR SOLUTION INTRAMUSCULAR; INTRAVENOUS at 09:03

## 2023-02-18 RX ADMIN — GUAIFENESIN 600 MG: 600 TABLET, EXTENDED RELEASE ORAL at 20:16

## 2023-02-18 RX ADMIN — IPRATROPIUM BROMIDE AND ALBUTEROL SULFATE 3 ML: .5; 2.5 SOLUTION RESPIRATORY (INHALATION) at 15:31

## 2023-02-18 RX ADMIN — ATENOLOL 100 MG: 50 TABLET ORAL at 08:41

## 2023-02-18 RX ADMIN — METHYLPREDNISOLONE SODIUM SUCCINATE 40 MG: 40 INJECTION, POWDER, FOR SOLUTION INTRAMUSCULAR; INTRAVENOUS at 16:01

## 2023-02-18 RX ADMIN — TAMSULOSIN HYDROCHLORIDE 0.4 MG: 0.4 CAPSULE ORAL at 08:41

## 2023-02-18 RX ADMIN — LOSARTAN POTASSIUM 100 MG: 100 TABLET, FILM COATED ORAL at 08:41

## 2023-02-18 RX ADMIN — GABAPENTIN 600 MG: 300 CAPSULE ORAL at 08:41

## 2023-02-18 RX ADMIN — POTASSIUM CHLORIDE 20 MEQ: 1500 TABLET, EXTENDED RELEASE ORAL at 11:14

## 2023-02-18 RX ADMIN — TAMSULOSIN HYDROCHLORIDE 0.4 MG: 0.4 CAPSULE ORAL at 20:16

## 2023-02-18 RX ADMIN — GABAPENTIN 600 MG: 300 CAPSULE ORAL at 16:01

## 2023-02-18 RX ADMIN — Medication 10 ML: at 20:16

## 2023-02-18 RX ADMIN — DILTIAZEM HYDROCHLORIDE 60 MG: 60 TABLET, FILM COATED ORAL at 05:09

## 2023-02-18 RX ADMIN — DULOXETINE HYDROCHLORIDE 60 MG: 60 CAPSULE, DELAYED RELEASE ORAL at 08:41

## 2023-02-18 RX ADMIN — METHYLPREDNISOLONE SODIUM SUCCINATE 40 MG: 40 INJECTION, POWDER, FOR SOLUTION INTRAMUSCULAR; INTRAVENOUS at 23:33

## 2023-02-18 RX ADMIN — IPRATROPIUM BROMIDE AND ALBUTEROL SULFATE 3 ML: .5; 2.5 SOLUTION RESPIRATORY (INHALATION) at 08:15

## 2023-02-18 RX ADMIN — IPRATROPIUM BROMIDE AND ALBUTEROL SULFATE 3 ML: .5; 2.5 SOLUTION RESPIRATORY (INHALATION) at 19:36

## 2023-02-18 ASSESSMENT — PAIN SCALES - GENERAL
PAINLEVEL_OUTOF10: 5
PAINLEVEL_OUTOF10: 4
PAINLEVEL_OUTOF10: 6
PAINLEVEL_OUTOF10: 4

## 2023-02-18 ASSESSMENT — PAIN DESCRIPTION - ORIENTATION: ORIENTATION: LEFT;LOWER

## 2023-02-18 ASSESSMENT — PAIN DESCRIPTION - PAIN TYPE: TYPE: CHRONIC PAIN

## 2023-02-18 ASSESSMENT — PAIN DESCRIPTION - ONSET: ONSET: ON-GOING

## 2023-02-18 ASSESSMENT — PAIN - FUNCTIONAL ASSESSMENT: PAIN_FUNCTIONAL_ASSESSMENT: ACTIVITIES ARE NOT PREVENTED

## 2023-02-18 ASSESSMENT — PAIN DESCRIPTION - FREQUENCY: FREQUENCY: CONTINUOUS

## 2023-02-18 ASSESSMENT — PAIN DESCRIPTION - LOCATION
LOCATION: BACK
LOCATION: BACK;HEAD
LOCATION: BACK
LOCATION: HEAD

## 2023-02-18 NOTE — PROGRESS NOTES
02/17/23 2000   RT Protocol   History Pulmonary Disease 2   Respiratory pattern 0   Breath sounds 4   Cough 0   Indications for Bronchodilator Therapy On home bronchodilators   Bronchodilator Assessment Score 6   RT Inhaler-Nebulizer Bronchodilator Protocol Note    There is a bronchodilator order in the chart from a provider indicating to follow the RT Bronchodilator Protocol and there is an Initiate RT Inhaler-Nebulizer Bronchodilator Protocol order as well (see protocol at bottom of note). CXR Findings:  XR CHEST PORTABLE    Result Date: 2/17/2023  Cardiomegaly and vascular congestion without evidence of interstitial edema. No confluent airspace consolidation. The findings from the last RT Protocol Assessment were as follows:   History Pulmonary Disease: Chronic pulmonary disease  Respiratory Pattern: Regular pattern and RR 12-20 bpm  Breath Sounds: Intermittent or unilateral wheezes  Cough: Strong, spontaneous, non-productive  Indication for Bronchodilator Therapy: On home bronchodilators  Bronchodilator Assessment Score: 6    Aerosolized bronchodilator medication orders have been revised according to the RT Inhaler-Nebulizer Bronchodilator Protocol below. Respiratory Therapist to perform RT Therapy Protocol Assessment initially then follow the protocol. Repeat RT Therapy Protocol Assessment PRN for score 0-3 or on second treatment, BID, and PRN for scores above 3. No Indications - adjust the frequency to every 6 hours PRN wheezing or bronchospasm, if no treatments needed after 48 hours then discontinue using Per Protocol order mode. If indication present, adjust the RT bronchodilator orders based on the Bronchodilator Assessment Score as indicated below.   Use Inhaler orders unless patient has one or more of the following: on home nebulizer, not able to hold breath for 10 seconds, is not alert and oriented, cannot activate and use MDI correctly, or respiratory rate 25 breaths per minute or more, then use the equivalent nebulizer order(s) with same Frequency and PRN reasons based on the score. If a patient is on this medication at home then do not decrease Frequency below that used at home. 0-3 - enter or revise RT bronchodilator order(s) to equivalent RT Bronchodilator order with Frequency of every 4 hours PRN for wheezing or increased work of breathing using Per Protocol order mode. 4-6 - enter or revise RT Bronchodilator order(s) to two equivalent RT bronchodilator orders with one order with BID Frequency and one order with Frequency of every 4 hours PRN wheezing or increased work of breathing using Per Protocol order mode. 7-10 - enter or revise RT Bronchodilator order(s) to two equivalent RT bronchodilator orders with one order with TID Frequency and one order with Frequency of every 4 hours PRN wheezing or increased work of breathing using Per Protocol order mode. 11-13 - enter or revise RT Bronchodilator order(s) to one equivalent RT bronchodilator order with QID Frequency and an Albuterol order with Frequency of every 4 hours PRN wheezing or increased work of breathing using Per Protocol order mode. Greater than 13 - enter or revise RT Bronchodilator order(s) to one equivalent RT bronchodilator order with every 4 hours Frequency and an Albuterol order with Frequency of every 2 hours PRN wheezing or increased work of breathing using Per Protocol order mode.        Electronically signed by Diaz Elder RCP on 2/17/2023 at 8:36 PM

## 2023-02-18 NOTE — PROGRESS NOTES
02/17/23 9257   NIV Type   $NIV $Daily Charge   Skin Assessment Clean, dry, & intact   Skin Protection for O2 Device Yes   Equipment Type v60   Mode Bilevel   Mask Type Full face mask   Mask Size Extra large   Settings/Measurements   IPAP 12 cmH20   CPAP/EPAP 5 cmH2O   Vt (Measured) 883 mL   Rate Ordered 14   Resp 21   FiO2  30 %   Minute Volume (L/min) 13.9 Liters   Mask Leak (lpm) 0 lpm   Comfort Level Good   Using Accessory Muscles No   SpO2 95   Patient's Home Machine No   Patient Observation   Observations spo2 95% on 30% bipap

## 2023-02-18 NOTE — PROGRESS NOTES
Handoff report given to [facility], [address] [facility]. Pt stable. Care transferred.

## 2023-02-18 NOTE — PLAN OF CARE
Problem: Discharge Planning  Goal: Discharge to home or other facility with appropriate resources  2/18/2023 1016 by Kassi Nunez RN  Outcome: Progressing  2/18/2023 0019 by Susan Jimenez RN  Outcome: Progressing     Problem: Pain  Goal: Verbalizes/displays adequate comfort level or baseline comfort level  2/18/2023 1016 by Kassi Nunez RN  Outcome: Progressing  2/18/2023 0019 by Susan Jimenez RN  Outcome: Progressing     Problem: Safety - Adult  Goal: Free from fall injury  2/18/2023 1016 by Kassi Nunez RN  Outcome: Progressing  2/18/2023 0019 by Susan Jimenez RN  Outcome: Progressing     Problem: Skin/Tissue Integrity  Goal: Absence of new skin breakdown  Description: 1. Monitor for areas of redness and/or skin breakdown  2. Assess vascular access sites hourly  3. Every 4-6 hours minimum:  Change oxygen saturation probe site  4. Every 4-6 hours:  If on nasal continuous positive airway pressure, respiratory therapy assess nares and determine need for appliance change or resting period.   2/18/2023 0019 by Susan Jimenez RN  Outcome: Progressing

## 2023-02-18 NOTE — PROGRESS NOTES
Patient requests to come off Bi-PAP. Resting quietly with continuous pulse oximetry mid 90%'s on 2L NC. Call in easy reach. Continue to monitor closely.   Susan Jimenez RN

## 2023-02-18 NOTE — PROGRESS NOTES
RT Inhaler-Nebulizer Bronchodilator Protocol Note    There is a bronchodilator order in the chart from a provider indicating to follow the RT Bronchodilator Protocol and there is an Initiate RT Inhaler-Nebulizer Bronchodilator Protocol order as well (see protocol at bottom of note). CXR Findings:  XR CHEST PORTABLE    Result Date: 2/17/2023  Cardiomegaly and vascular congestion without evidence of interstitial edema. No confluent airspace consolidation. The findings from the last RT Protocol Assessment were as follows:   History Pulmonary Disease: Chronic pulmonary disease  Respiratory Pattern: Regular pattern and RR 12-20 bpm  Breath Sounds: Intermittent or unilateral wheezes  Cough: Strong, spontaneous, non-productive  Indication for Bronchodilator Therapy: On home bronchodilators  Bronchodilator Assessment Score: 6    Aerosolized bronchodilator medication orders have been revised according to the RT Inhaler-Nebulizer Bronchodilator Protocol below. Respiratory Therapist to perform RT Therapy Protocol Assessment initially then follow the protocol. Repeat RT Therapy Protocol Assessment PRN for score 0-3 or on second treatment, BID, and PRN for scores above 3. No Indications - adjust the frequency to every 6 hours PRN wheezing or bronchospasm, if no treatments needed after 48 hours then discontinue using Per Protocol order mode. If indication present, adjust the RT bronchodilator orders based on the Bronchodilator Assessment Score as indicated below. Use Inhaler orders unless patient has one or more of the following: on home nebulizer, not able to hold breath for 10 seconds, is not alert and oriented, cannot activate and use MDI correctly, or respiratory rate 25 breaths per minute or more, then use the equivalent nebulizer order(s) with same Frequency and PRN reasons based on the score. If a patient is on this medication at home then do not decrease Frequency below that used at home.     0-3 - enter or revise RT bronchodilator order(s) to equivalent RT Bronchodilator order with Frequency of every 4 hours PRN for wheezing or increased work of breathing using Per Protocol order mode. 4-6 - enter or revise RT Bronchodilator order(s) to two equivalent RT bronchodilator orders with one order with BID Frequency and one order with Frequency of every 4 hours PRN wheezing or increased work of breathing using Per Protocol order mode. 7-10 - enter or revise RT Bronchodilator order(s) to two equivalent RT bronchodilator orders with one order with TID Frequency and one order with Frequency of every 4 hours PRN wheezing or increased work of breathing using Per Protocol order mode. 11-13 - enter or revise RT Bronchodilator order(s) to one equivalent RT bronchodilator order with QID Frequency and an Albuterol order with Frequency of every 4 hours PRN wheezing or increased work of breathing using Per Protocol order mode. Greater than 13 - enter or revise RT Bronchodilator order(s) to one equivalent RT bronchodilator order with every 4 hours Frequency and an Albuterol order with Frequency of every 2 hours PRN wheezing or increased work of breathing using Per Protocol order mode.        Electronically signed by Natasha De La Cruz RCP on 2/18/2023 at 8:18 AM

## 2023-02-18 NOTE — PROGRESS NOTES
Vitals:    02/18/23 1114   BP: 115/70   Pulse: 78   Resp:    Temp:    SpO2:      PO Cardizem and K+Chl 20 MEQ given for K+ 3.4 this morning. Pt urinated via urinal (see I/O). Wife at bedside. Pt denied any further needs. Call light in hand.

## 2023-02-18 NOTE — FLOWSHEET NOTE
02/18/23 0816   Vital Signs   Temp 97.5 °F (36.4 °C)   Temp Source Oral   Heart Rate (!) 121   Heart Rate Source Monitor   Resp 18   BP (!) 157/104   MAP (Calculated) 122   Patient Position Sitting   Level of Consciousness 0   MEWS Score 3   Pain Assessment   Pain Assessment 0-10   Pain Level 4   Pain Location Back   Oxygen Therapy   SpO2 95 %   O2 Device Nasal cannula   O2 Flow Rate (L/min) 2 L/min   Patient Observation   Observations spo2 97% on 2lpm nc       Pt sitting up in chair this morning eating breakfast, A&O x 4, VSS. Pt remains on 2L of Oxygen, shift assessment complete and all meds given per MAR. Pts wife to bring in home CPAP today. Pt rating back pain 4/10, pt also has c/o headache, and intermittent nausea. PRN Tylenol given. Pt to have echo this morning. Bed in lowest position, call light in hand. All belongings near. Pt denied any further needs. Will continue to monitor.

## 2023-02-18 NOTE — PROGRESS NOTES
Progress Note    Admit Date:  2/17/2023    Admitted with new onset atrial fibrillation, CAP, acute CHF    Subjective:  Mr. Julian Chappell is sitting up in bed, wife at bedside. Stated breathing is a little better today. +left lower back pain/abdominal pain since coughing. Objective:   Patient Vitals for the past 4 hrs:   BP Temp Temp src Pulse Resp SpO2   02/18/23 0816 (!) 157/104 97.5 °F (36.4 °C) Oral (!) 121 18 95 %          Intake/Output Summary (Last 24 hours) at 2/18/2023 1107  Last data filed at 2/18/2023 0908  Gross per 24 hour   Intake 1620 ml   Output 2825 ml   Net -1205 ml       Physical Exam:    Gen: No distress. Alert. Obese. Eyes: PERRL. No sclera icterus. No conjunctival injection. Neck: No JVD. Trachea midline. Resp: No accessory muscle use. No crackles. Diffuse rhonchi. +Diffuse wheezing  CV: Irregularly irregular rhythm. No murmur. No rub. 1+ BLE edema  Peripheral Pulses: +2 palpable, equal bilaterally   GI: Non-tender. Non-distended. No masses. No organomegaly. Normal bowel sounds. No hernia. Skin: Warm and dry. No nodule on exposed extremities. No rash on exposed extremities. M/S: No cyanosis. No joint deformity. No clubbing. Neuro: Awake. Grossly nonfocal    Psych: Oriented x 3. No anxiety or agitation.       Scheduled Meds:   potassium chloride  20 mEq Oral BID WC    tamsulosin  0.4 mg Oral BID    losartan  100 mg Oral Daily    gabapentin  600 mg Oral TID    atorvastatin  20 mg Oral Daily    atenolol  100 mg Oral Daily    DULoxetine  60 mg Oral Daily    sodium chloride flush  5-40 mL IntraVENous 2 times per day    cefTRIAXone (ROCEPHIN) IV  1,000 mg IntraVENous Q24H    And    azithromycin  500 mg Oral Q24H    guaiFENesin  600 mg Oral BID    enoxaparin  1 mg/kg SubCUTAneous BID    dilTIAZem  60 mg Oral 4 times per day    furosemide  40 mg IntraVENous Daily    ipratropium-albuterol  1 vial Inhalation 4x daily       Continuous Infusions:   sodium chloride         PRN Meds:  prochlorperazine, albuterol sulfate HFA, sodium chloride flush, sodium chloride, polyethylene glycol, acetaminophen **OR** acetaminophen, benzonatate, perflutren lipid microspheres, hydrALAZINE      Data:  CBC:   Recent Labs     02/17/23  1055 02/18/23  0432   WBC 6.3 7.3   HGB 13.4* 13.6   HCT 40.4* 40.6   MCV 89.2 88.6   * 121*     BMP:   Recent Labs     02/17/23  1055 02/18/23  0432    137   K 4.9 3.4*    99   CO2 24 24   BUN 18 18   CREATININE 0.9 0.8     LIVER PROFILE:   Recent Labs     02/17/23  1055   AST 27   ALT 16   BILITOT 1.8*   ALKPHOS 89     PT/INR: No results for input(s): PROTIME, INR in the last 72 hours. CULTURES  Blood Cultures: pending      RADIOLOGY  CT CHEST PULMONARY EMBOLISM W CONTRAST   Final Result   No evidence of pulmonary embolism. Numerous small ground-glass nodular opacities are noted throughout the   bilateral lower lobes likely reflecting an infectious/inflammatory process. Recommend follow-up chest CT in 3 months to ensure resolution. Enlarged thyroid. Recommend further evaluation with non emergent thyroid   ultrasound. XR CHEST PORTABLE   Final Result   Cardiomegaly and vascular congestion without evidence of interstitial edema. No confluent airspace consolidation. ECHO done 2/18/23   Conclusions      Summary   Technically limited study given tachycardia and afib. Left ventricular systolic function is normal with ejection fraction   estimated at 55-60 %. No regional wall motion abnormalities are noted. There is mild concentric left ventricular hypertrophy with sigmoid shaped   septum. Normal left ventricular diastolic filling pressure. Bi-atrial enlargement. The right ventricle is mildly enlarged. The aortic root is mildly dilated. The ascending aorta is moderately dilated. Mild mitral regurgitation. Mild aortic regurgitation is present. Mild tricuspid regurgitation.    Systolic pulmonary artery pressure (SPAP) estimated at 45 mmHg (RA pressure   15 mmHg), consistent with mild pulmonary hypertension. Mild pulmonic regurgitation present. Consider repeat study when rate controlled if clinically indicate.d      Signature      ------------------------------------------------------------------   Electronically signed by Jorge Sanders DO (Interpreting    Heydi Arias MD have reviewed the chart on Tessie Perkins and personally interviewed and examined patient, reviewed the data (labs and imaging) and after discussion with my PA formulated the plan. Agree with note with the following edits. HPI:     Patient is admitted to hospital with rapid A-fib. Also has complaints of wheezing and shortness of breath. He has known asthma. Also diagnosed with acute CHF. Patient states subjectively feels some better. He does complain of some left lower back pain worse with movement. It feels muscular. I reviewed the patient's Past Medical History, Past Surgical History, Medications, and Allergies. Physical exam:    /70   Pulse 78   Temp 97.5 °F (36.4 °C) (Oral)   Resp 18   Ht 5' 11\" (1.803 m)   Wt (!) 333 lb (151 kg)   SpO2 96%   BMI 46.44 kg/m²     Gen: No distress. Alert. Eyes: PERRL. No sclera icterus. No conjunctival injection. ENT: No discharge. Pharynx clear. Neck: Trachea midline. Normal thyroid. Resp: Mild accessory muscle use. No crackles. Moderate wheezes. No rhonchi. No dullness on percussion. CV: Irregularly irregular rate and rhythm. No murmur or rub. No edema. GI: Non-tender. Non-distended. No masses. No organomegaly. Normal bowel sounds. No hernia. Skin: Warm and dry. No nodule on exposed extremities. No rash on exposed extremities. Lymph: No cervical LAD. No supraclavicular LAD. M/S: No cyanosis. No joint deformity. No clubbing. Neuro: Awake. Reflexes 2+ symmetric bilaterally. Moves all 4 extremities, non focal  Psych: Oriented x 3. No anxiety or agitation. Assessment/Plan:  #New onset afib RVR  -cardiology consulted   -with initial rates in 120s --> improved with home dose atenolol  -trop <0.01 x2  -TSH 1.52  -echo done  -start full dose lovenox  -start oral cardizem, dosing per cardiology  -continuous tele     [id number] Score for Atrial Fibrillation Stroke Risk       Risk   Factors   Component Value   C CHF No 0   H HTN Yes 1   A2 Age >= 76 No,  (67 y.o.) 0   D DM No 0   S2 Prior Stroke/TIA No 0   V Vascular Disease No 0   A Age 74-69 Yes,  (67 y.o.) 1   Sc Sex male 0     [id number]  Score   2   Score last updated 2/17/23 3:11 PM EST        #Acute diastolic CHF  -BNP 4k  -no prior echo, pending   -holding daily hctz and diuresing with lasix 40 daily  -low Na diet, strict I&Os, daily weights  - weight on admission 339--333  - net negative since admission 1,250   -cardiology following     #CAP, likely 2/2 gram positive organism  -CT chest with ground-glass nodular opacities   -procal negative   -sputum cultures pending   -mucinex and prn tessalon  -duonebs and albuterol   -rocephin and Zithromax D#2    #Hemoptysis - scant prior to admission   - noted x3 days blood tinged sputum prior to admission  - he stated it is now tan, no blood since arrival to the ed  - monitor closely with anticoagulation       #Hypoxia  - on 2 liters of oxygen  - no oxygen at baseline  - likely multifactorial 2/2 CAP and CHF    #Asthma with AE  -continue duonebs and albuterol  -start steroids       #Chest pain, atypical  - troponin negative  - cardiology following, consider stress test when recovered from acute illness  - monitor on telemetry     #left lower back and abdomen pain  - likely musculoskeletal in nature from coughing   - +reproducible   - no injury     #Hypokalemia  - replacement ordered BID  - monitor      #Enlarged thyroid  -seen on CT  -non-emergent thyroid US recommended  -TSH pending     #Thrombocytopenia  -platelet count 090  -monitor with anticoagulation #HTN  -elevated on admission  -continue atenolol and losartan     #HLD  -continue statin       #NICOLE  -BiPAP nightly      #BPH  -continue flomax  - pt having cystoscopy in March      #Neuropathy  -continue cymbalta and gabapentin     #Morbid Obesity  - Body mass index is 46.44 kg/m². - Complicating assessment and treatment. Placing patient at risk for multiple co-morbidities as well as early death and contributing to the patient's presentation.   - Recommend on weight loss. DVT Prophylaxis: Lovenox   Diet: ADULT DIET; Regular;  Low Sodium (2 gm); 2000 ml  Code Status: Full Code      JASEN Stratton - CNP       Bernard Romero MD 2/18/2023 1:21 PM

## 2023-02-18 NOTE — PROGRESS NOTES
[facility]  progress  [phone]      Chief Complaint   Patient presents with    Shortness of Breath     Pt reports 4 day history of cough/congestion with SOB starting last night. Went to PCP today, tested negative for COVID so she referred him to ER for further evaluation. History of Present Illness:  Karen Glasgow is a 77 y.o. patient who presented to the hospital with complaints of shortness of breath. I have been asked to provide consultation regarding further management and testing. Subjective: no acute events overnight. No chest pain or pressure. He reports that he has been coughing. He states that he did not sleep last night secondary to headache and left back pain. He did not use the cpap last night as it was too loud. Past Medical History:   has a past medical history of Anxiety, Asthma, BPH, Dermatitis due to sun, Hearing loss, Hyperlipidemia, Hypertension, Narcolepsy, Osteoarthritis, Sleep apnea, and Superficial peroneal nerve neuropathy. Surgical History:   has a past surgical history that includes Cholecystectomy; Appendectomy; Colonoscopy (12/19/11); Total shoulder arthroplasty (6/19/2012); knee surgery (Right, 04/22/2016); Total knee arthroplasty (Left, 07/19/2016); and other surgical history (Right, 09/01/2016). Social History:   reports that he quit smoking about 24 years ago. His smoking use included cigarettes. He has a 96.00 pack-year smoking history. He has never used smokeless tobacco. He reports that he does not drink alcohol and does not use drugs. Family History:  No family history of premature coronary artery disease, aortic disease, or valve disease. Family history in [de-identified] and9 0s    Home Medications:  Were reviewed and are listed in nursing record. and/or listed below  Prior to Admission medications    Medication Sig Start Date End Date Taking?  Authorizing Provider   DULoxetine (CYMBALTA) 60 MG extended release capsule TAKE 1 CAPSULE EVERY DAY 11/28/22   Bambi Peed, APRN - CNP   gabapentin (NEURONTIN) 300 MG capsule TAKE 2 CAPSULES THREE TIMES DAILY 11/28/22 2/28/23  Bambi Peed, APRN - CNP   losartan (COZAAR) 100 MG tablet TAKE 1 TABLET EVERY DAY 11/28/22   Bambi Peed, APRN - CNP   albuterol sulfate HFA (VENTOLIN HFA) 108 (90 Base) MCG/ACT inhaler Inhale 2 puffs into the lungs 4 times daily as needed for Wheezing 9/27/22   Bambi Peed, APRN - CNP   ipratropium-albuterol (DUONEB) 0.5-2.5 (3) MG/3ML SOLN nebulizer solution Inhale 3 mLs into the lungs every 4 hours 9/27/22   Bambi Peed, APRN - CNP   sulindac (CLINORIL) 200 MG tablet TAKE 1 TABLET EVERY DAY 9/8/22   Bambi Peed, APRN - CNP   hydroCHLOROthiazide (MICROZIDE) 12.5 MG capsule TAKE 1 CAPSULE EVERY DAY 9/8/22   Bambi Peed, APRN - CNP   tamsulosin (FLOMAX) 0.4 MG capsule TAKE 1 CAPSULE TWICE DAILY 8/17/22   Bambi Peed, APRN - CNP   atorvastatin (LIPITOR) 20 MG tablet TAKE 1 TABLET EVERY DAY 8/5/22   Bambi Peed, APRN - CNP   atenolol (TENORMIN) 100 MG tablet TAKE 1 TABLET EVERY DAY 8/5/22   Bambi Peed, APRN - CNP   sildenafil (VIAGRA) 50 MG tablet Take 50 mg by mouth as needed for Erectile Dysfunction    Historical Provider, MD   Handicap Placard MISC by Does not apply route 12/2/21   Bambi Peed, APRN - CNP   Multiple Vitamin (THERA) TABS Take 1 tablet by mouth daily. Historical Provider, MD KRIS HUNG Take  by mouth daily.     Historical Provider, MD        Current Medications:  Current Facility-Administered Medications   Medication Dose Route Frequency Provider Last Rate Last Admin    prochlorperazine (COMPAZINE) injection 10 mg  10 mg IntraVENous Q6H PRN Chong Wilson MD   10 mg at 02/18/23 0335    tamsulosin (FLOMAX) capsule 0.4 mg  0.4 mg Oral BID Danny Ramos PA-C   0.4 mg at 02/17/23 2047    losartan (COZAAR) tablet 100 mg  100 mg Oral Daily Marixa Barber PA-C        gabapentin (NEURONTIN) capsule 600 mg  600 mg Oral TID Marixa Barber PA-C   600 mg at 02/17/23 2055    atorvastatin (LIPITOR) tablet 20 mg  20 mg Oral Daily Marixa Barber PA-C        atenolol (TENORMIN) tablet 100 mg  100 mg Oral Daily Marixa Barber PA-C        albuterol sulfate HFA (PROVENTIL;VENTOLIN;PROAIR) 108 (90 Base) MCG/ACT inhaler 2 puff  2 puff Inhalation 4x Daily PRN Marixa Barber PA-C        DULoxetine (CYMBALTA) extended release capsule 60 mg  60 mg Oral Daily Marixa Barber PA-C        sodium chloride flush 0.9 % injection 5-40 mL  5-40 mL IntraVENous 2 times per day Marixa Barber PA-C   10 mL at 02/17/23 2047    sodium chloride flush 0.9 % injection 10 mL  10 mL IntraVENous PRN Marixa Barber PA-C        0.9 % sodium chloride infusion   IntraVENous PRN Marixa Barber PA-C        polyethylene glycol (GLYCOLAX) packet 17 g  17 g Oral Daily PRN Marixa Barber PA-C        acetaminophen (TYLENOL) tablet 650 mg  650 mg Oral Q6H PRN Marixa Barber PA-C   650 mg at 02/18/23 0253    Or    acetaminophen (TYLENOL) suppository 650 mg  650 mg Rectal Q6H PRN Marixa Barber PA-C        cefTRIAXone (ROCEPHIN) 1,000 mg in sodium chloride 0.9 % 50 mL IVPB (mini-bag)  1,000 mg IntraVENous Q24H Marixa Barber PA-C        And    azithromycin (ZITHROMAX) tablet 500 mg  500 mg Oral Q24H Marixa Barber PA-C   500 mg at 02/17/23 1839    guaiFENesin (MUCINEX) extended release tablet 600 mg  600 mg Oral BID Marixa Barber PA-C   600 mg at 02/17/23 2047    benzonatate (TESSALON) capsule 100 mg  100 mg Oral TID PRN Marixa Barber PA-C        enoxaparin (LOVENOX) injection 150 mg  1 mg/kg SubCUTAneous BID Marixa Barber PA-C   150 mg at 02/17/23 2047    dilTIAZem (CARDIZEM) tablet 60 mg  60 mg Oral 4 times per day Marixa Barber PA-C   60 mg at 02/18/23 0509    perflutren lipid microspheres (DEFINITY) injection 1.5 mL  1.5 mL IntraVENous ONCE PRN Marixa Barber PA-C        furosemide (LASIX) injection 40 mg  40 mg IntraVENous Daily Marixa Barber PA-C        hydrALAZINE (APRESOLINE) injection 10 mg  10 mg IntraVENous Q4H PRN William Celestin MD   10 mg at 02/18/23 0253    ipratropium-albuterol (DUONEB) nebulizer solution 3 mL  1 vial Inhalation 4x daily Alexis Bo MD   3 mL at 02/18/23 0815        Allergies:  Celebrex [celecoxib], Elavil [amitriptyline hcl], and Topamax     Review of Systems:     Constitutional: there has been no unanticipated weight loss. +fatigue  Eyes: No visual changes or diplopia. No scleral icterus. ENT: Nno hearing loss or vertigo. No mouth sores or sore throat. +headache  Cardiovascular: Reviewed in HPI  Respiratory: +cough + wheeze  Gastrointestinal: No abdominal pain, appetite loss, blood in stools. No change in bowel or bladder habits. Genitourinary: No dysuria, trouble voiding, or hematuria. Musculoskeletal:  No gait disturbance, weakness or joint complaints. Integumentary: No rash or pruritis. Neurological: No headache, diplopia, change in muscle strength, numbness or tingling. No change in gait, balance, coordination, mood, affect, memory, mentation, behavior. Psychiatric: No anxiety, no depression. Endocrine: No malaise, fatigue or temperature intolerance. No excessive thirst, fluid intake, or urination. No tremor. Hematologic/Lymphatic: No abnormal bruising or bleeding, blood clots or swollen lymph nodes. Allergic/Immunologic: No nasal congestion or hives.       Physical Examination:    Vitals:    02/18/23 0816   BP:    Pulse:    Resp:    Temp:    SpO2: 95%    Weight: (!) 333 lb (151 kg)         General Appearance:  Alert, cooperative, no distress, appears stated age   Head:  Normocephalic, without obvious abnormality, atraumatic   Eyes:  PERRL, conjunctiva/corneas clear       Nose: Nares normal, no drainage or sinus tenderness   Throat: Lips, mucosa, and tongue normal   Neck: Supple, symmetrical, trachea midline, no adenopathy, thyroid: not enlarged, symmetric, no tenderness/mass/nodules, +JVD       Lungs:   +wheezes   Chest Wall:  No tenderness or deformity   Heart:  Irregularly irregular, 2/6 murmur   Abdomen: Soft, non-tender, bowel sounds active all four quadrants,  no masses, no organomegaly           Extremities: Extremities normal, atraumatic, trace edema   Pulses: 2+ and symmetric   Skin: Skin color, texture, turgor normal, no rashes or lesions   Pysch: Normal mood and affect   Neurologic: Normal gross motor and sensory exam.         Labs  CBC:   Lab Results   Component Value Date/Time    WBC 7.3 02/18/2023 04:32 AM    RBC 4.58 02/18/2023 04:32 AM    HGB 13.6 02/18/2023 04:32 AM    HCT 40.6 02/18/2023 04:32 AM    MCV 88.6 02/18/2023 04:32 AM    RDW 14.3 02/18/2023 04:32 AM     02/18/2023 04:32 AM     CMP:    Lab Results   Component Value Date/Time     02/18/2023 04:32 AM    K 3.4 02/18/2023 04:32 AM    CL 99 02/18/2023 04:32 AM    CO2 24 02/18/2023 04:32 AM    BUN 18 02/18/2023 04:32 AM    CREATININE 0.8 02/18/2023 04:32 AM    GFRAA >60 12/07/2021 08:01 AM    GFRAA >60 06/01/2013 10:21 AM    AGRATIO 1.6 02/17/2023 10:55 AM    LABGLOM >60 02/18/2023 04:32 AM    GLUCOSE 134 02/18/2023 04:32 AM    PROT 7.2 02/17/2023 10:55 AM    CALCIUM 8.3 02/18/2023 04:32 AM    BILITOT 1.8 02/17/2023 10:55 AM    ALKPHOS 89 02/17/2023 10:55 AM    AST 27 02/17/2023 10:55 AM    ALT 16 02/17/2023 10:55 AM     PT/INR:  No results found for: PTINR  Lab Results   Component Value Date    TROPONINI <0.01 02/17/2023       EKG:  I have reviewed EKG with the following interpretation:  Impression:  atrial fibrillation, right bundle branch block     Echo: none  Stress: none  Cath: none  MRI/EP/Other: none    Old notes reviewed  Telemetry reviewd  Ekg personally reviewed  Chest xray personally reviewed  Echo, cath, and   Medications and labs reviewed  moderate complexity/medical decision making due to extensive data review, extensive history review, independent review of data, potential for decompensation   moderate risk due to acute illness, evaluation of drug-drug interactions, medication management and diagnostic interventions  Discussed with wife    Assessment  Patient Active Problem List   Diagnosis    Arthritis    Anxiety    Mild intermittent asthma without complication    Colon polyp    Testosterone deficiency    left TSR    Impotence due to erectile dysfunction    Uncontrolled hypertension    Primary narcolepsy without cataplexy    Benign non-nodular prostatic hyperplasia with lower urinary tract symptoms    Hypercholesteremia    Neuropathy involving both lower extremities    Vitamin D deficiency    Morbid obesity with BMI of 40.0-44.9, adult (HCC)    NICOLE (obstructive sleep apnea)    Status post total right knee replacement    Atrial fibrillation ([address]    Acute congestive heart failure ([address]    RBBB    Cardiomegaly    Pneumonia due to infectious organism    Shortness of breath         Plan:    I had the opportunity to review the clinical symptoms and presentation of Hernandez Martin. Assessment/Plan:  Acute hypoxic respiratory failure  - new problem  - likely secondary to uri and hefpef  Plan:  - continue diuresis  - abx per primary    2. New onset atrial fibrillation  . [id number] of 3  - tsh normal  Plan:  - continue anticoagulation and rate control   - echo    3. Hypokalemia  - new problem  Plan:-   replace    4. Thrombocytopenia    5. Atypical left sided chest discomfort  - - worsens with coughing,  Troponin negative  Plan:  - consider stress test when recovered from acute illness    6. Headache and backpain  - new problem  Plan:  - per primary         I will address the patient's cardiac risk factors and adjusted pharmacologic treatment as needed. In addition, I have reinforced the need for patient directed risk factor modification. Tobacco use was discussed with the patient and educated on the negative effects. I have asked the patient to not utilize these agents. Thank you for allowing to us to participate in the care or Hernandez Martin.  Further evaluation will be based upon the patient's clinical course and testing results. All questions and concerns were addressed to the patient/family. Alternatives to my treatment were discussed. The note was completed using EMR. Every effort was made to ensure accuracy; however, inadvertent computerized transcription errors may be present. All questions and concerns were addressed to the patient/family. Alternatives to my treatment were discussed. The note was completed using EMR. Every effort was made to ensure accuracy; however, inadvertent computerized transcription errors may be present.   Cheryl Rutherford DO, MD 2/18/2023 8:21 AM

## 2023-02-18 NOTE — PROGRESS NOTES
Patient education printed for patient on the following topics; Low salt diet  Fluid restriction  Congestive heart failure  CAP  Atrial fibrillation    Fluid restriction paper and 180 mL cup given to patient and patients wife for visual references. Pt encouraged to follow low salt diet at home and to keep track of fluid intake to avoid fluid overload when discharged home in the next few days. Pt informed nurse isn't for sure that this is CHF or CAP per doctors note, but patient educated on both regardless. Vitals:    02/18/23 1757   BP: 117/73   Pulse: 68   Resp:    Temp:    SpO2: 95%     Patient remains stable on room air, vitals improving from soft BP earlier. Bed in lowest position, call light in hand. Pt encouraged to call nurse for any further needs.

## 2023-02-18 NOTE — FLOWSHEET NOTE
02/18/23 1545   Vital Signs   Temp 97.2 °F (36.2 °C)   Temp Source Oral   Heart Rate 53   Heart Rate Source Monitor   Resp 18   BP 93/61   MAP (Calculated) 72   BP Location Left lower arm   Patient Position Sitting   Level of Consciousness 0   MEWS Score 2   Pain Assessment   Pain Assessment 0-10   Pain Level 5   Pain Location Back   Pain Orientation Left; Lower   Oxygen Therapy   SpO2 96 %   O2 Device None (Room air)       Pts Oxygenation 96%, pt is on continuous pulse ox. Oxygen removed from patient, and now on room air. Family at bedside. Pt A&O x 4. Questions answered for family. Bed in lowest position, call light in hand.

## 2023-02-19 LAB
ANION GAP SERPL CALCULATED.3IONS-SCNC: 9 MMOL/L (ref 3–16)
BASOPHILS ABSOLUTE: 0 K/UL (ref 0–0.2)
BASOPHILS RELATIVE PERCENT: 0.2 %
BUN BLDV-MCNC: 31 MG/DL (ref 7–20)
CALCIUM SERPL-MCNC: 9.2 MG/DL (ref 8.3–10.6)
CHLORIDE BLD-SCNC: 100 MMOL/L (ref 99–110)
CO2: 26 MMOL/L (ref 21–32)
CREAT SERPL-MCNC: 1.2 MG/DL (ref 0.8–1.3)
CULTURE, RESPIRATORY: NORMAL
EOSINOPHILS ABSOLUTE: 0 K/UL (ref 0–0.6)
EOSINOPHILS RELATIVE PERCENT: 0.1 %
GFR SERPL CREATININE-BSD FRML MDRD: >60 ML/MIN/{1.73_M2}
GLUCOSE BLD-MCNC: 152 MG/DL (ref 70–99)
GRAM STAIN RESULT: NORMAL
HCT VFR BLD CALC: 40.1 % (ref 40.5–52.5)
HEMOGLOBIN: 13.3 G/DL (ref 13.5–17.5)
LYMPHOCYTES ABSOLUTE: 0.4 K/UL (ref 1–5.1)
LYMPHOCYTES RELATIVE PERCENT: 7.7 %
MCH RBC QN AUTO: 29.6 PG (ref 26–34)
MCHC RBC AUTO-ENTMCNC: 33.1 G/DL (ref 31–36)
MCV RBC AUTO: 89.7 FL (ref 80–100)
MONOCYTES ABSOLUTE: 0.1 K/UL (ref 0–1.3)
MONOCYTES RELATIVE PERCENT: 2.6 %
NEUTROPHILS ABSOLUTE: 5 K/UL (ref 1.7–7.7)
NEUTROPHILS RELATIVE PERCENT: 89.4 %
PDW BLD-RTO: 14.3 % (ref 12.4–15.4)
PLATELET # BLD: 137 K/UL (ref 135–450)
PMV BLD AUTO: 10.2 FL (ref 5–10.5)
POTASSIUM REFLEX MAGNESIUM: 4.1 MMOL/L (ref 3.5–5.1)
RBC # BLD: 4.47 M/UL (ref 4.2–5.9)
S PYO THROAT QL CULT: NORMAL
SODIUM BLD-SCNC: 135 MMOL/L (ref 136–145)
WBC # BLD: 5.5 K/UL (ref 4–11)

## 2023-02-19 PROCEDURE — 94669 MECHANICAL CHEST WALL OSCILL: CPT

## 2023-02-19 PROCEDURE — 2060000000 HC ICU INTERMEDIATE R&B

## 2023-02-19 PROCEDURE — 6360000002 HC RX W HCPCS: Performed by: NURSE PRACTITIONER

## 2023-02-19 PROCEDURE — 94640 AIRWAY INHALATION TREATMENT: CPT

## 2023-02-19 PROCEDURE — 80048 BASIC METABOLIC PNL TOTAL CA: CPT

## 2023-02-19 PROCEDURE — 6360000002 HC RX W HCPCS

## 2023-02-19 PROCEDURE — 6370000000 HC RX 637 (ALT 250 FOR IP)

## 2023-02-19 PROCEDURE — 6370000000 HC RX 637 (ALT 250 FOR IP): Performed by: INTERNAL MEDICINE

## 2023-02-19 PROCEDURE — 94761 N-INVAS EAR/PLS OXIMETRY MLT: CPT

## 2023-02-19 PROCEDURE — 85025 COMPLETE CBC W/AUTO DIFF WBC: CPT

## 2023-02-19 PROCEDURE — 99232 SBSQ HOSP IP/OBS MODERATE 35: CPT | Performed by: INTERNAL MEDICINE

## 2023-02-19 PROCEDURE — 36415 COLL VENOUS BLD VENIPUNCTURE: CPT

## 2023-02-19 PROCEDURE — 2580000003 HC RX 258

## 2023-02-19 RX ORDER — IPRATROPIUM BROMIDE AND ALBUTEROL SULFATE 2.5; .5 MG/3ML; MG/3ML
1 SOLUTION RESPIRATORY (INHALATION) 4 TIMES DAILY PRN
Status: DISCONTINUED | OUTPATIENT
Start: 2023-02-19 | End: 2023-02-20 | Stop reason: HOSPADM

## 2023-02-19 RX ORDER — TORSEMIDE 20 MG/1
10 TABLET ORAL DAILY
Status: DISCONTINUED | OUTPATIENT
Start: 2023-02-20 | End: 2023-02-20

## 2023-02-19 RX ORDER — DILTIAZEM HYDROCHLORIDE 240 MG/1
240 CAPSULE, COATED, EXTENDED RELEASE ORAL DAILY
Status: DISCONTINUED | OUTPATIENT
Start: 2023-02-19 | End: 2023-02-20 | Stop reason: HOSPADM

## 2023-02-19 RX ADMIN — DILTIAZEM HYDROCHLORIDE 240 MG: 240 CAPSULE, COATED, EXTENDED RELEASE ORAL at 14:30

## 2023-02-19 RX ADMIN — GABAPENTIN 600 MG: 300 CAPSULE ORAL at 09:45

## 2023-02-19 RX ADMIN — ENOXAPARIN SODIUM 150 MG: 150 INJECTION SUBCUTANEOUS at 09:49

## 2023-02-19 RX ADMIN — GABAPENTIN 600 MG: 300 CAPSULE ORAL at 20:51

## 2023-02-19 RX ADMIN — LOSARTAN POTASSIUM 100 MG: 100 TABLET, FILM COATED ORAL at 09:45

## 2023-02-19 RX ADMIN — GABAPENTIN 600 MG: 300 CAPSULE ORAL at 14:30

## 2023-02-19 RX ADMIN — TAMSULOSIN HYDROCHLORIDE 0.4 MG: 0.4 CAPSULE ORAL at 20:51

## 2023-02-19 RX ADMIN — GUAIFENESIN 600 MG: 600 TABLET, EXTENDED RELEASE ORAL at 09:45

## 2023-02-19 RX ADMIN — DILTIAZEM HYDROCHLORIDE 60 MG: 60 TABLET, FILM COATED ORAL at 04:29

## 2023-02-19 RX ADMIN — AZITHROMYCIN MONOHYDRATE 500 MG: 250 TABLET ORAL at 16:58

## 2023-02-19 RX ADMIN — METHYLPREDNISOLONE SODIUM SUCCINATE 40 MG: 40 INJECTION, POWDER, FOR SOLUTION INTRAMUSCULAR; INTRAVENOUS at 14:30

## 2023-02-19 RX ADMIN — ATENOLOL 100 MG: 50 TABLET ORAL at 09:45

## 2023-02-19 RX ADMIN — ATORVASTATIN CALCIUM 20 MG: 10 TABLET, FILM COATED ORAL at 09:45

## 2023-02-19 RX ADMIN — TAMSULOSIN HYDROCHLORIDE 0.4 MG: 0.4 CAPSULE ORAL at 09:45

## 2023-02-19 RX ADMIN — CEFTRIAXONE SODIUM 1000 MG: 1 INJECTION, POWDER, FOR SOLUTION INTRAMUSCULAR; INTRAVENOUS at 10:03

## 2023-02-19 RX ADMIN — GUAIFENESIN 600 MG: 600 TABLET, EXTENDED RELEASE ORAL at 20:51

## 2023-02-19 RX ADMIN — IPRATROPIUM BROMIDE AND ALBUTEROL SULFATE 3 ML: .5; 2.5 SOLUTION RESPIRATORY (INHALATION) at 11:46

## 2023-02-19 RX ADMIN — POTASSIUM CHLORIDE 20 MEQ: 1500 TABLET, EXTENDED RELEASE ORAL at 09:45

## 2023-02-19 RX ADMIN — DULOXETINE HYDROCHLORIDE 60 MG: 60 CAPSULE, DELAYED RELEASE ORAL at 09:47

## 2023-02-19 ASSESSMENT — PAIN SCALES - GENERAL: PAINLEVEL_OUTOF10: 0

## 2023-02-19 NOTE — PROGRESS NOTES
[facility] [place].   Hilton Parker RN

## 2023-02-19 NOTE — PROGRESS NOTES
Progress Note    Admit Date:  2/17/2023    Admitted with new onset atrial fibrillation, CAP, acute CHF    Subjective:  Mr. Leonarda Giraldo is feeling better. Steroids have helped a lot. Objective:   Patient Vitals for the past 4 hrs:   BP Temp Temp src Pulse Resp SpO2   02/19/23 1147 -- -- -- -- -- 96 %   02/19/23 0934 117/71 97.7 °F (36.5 °C) Oral 85 19 95 %            Intake/Output Summary (Last 24 hours) at 2/19/2023 1223  Last data filed at 2/19/2023 1003  Gross per 24 hour   Intake 902 ml   Output 2350 ml   Net -1448 ml         Physical Exam:    Gen: No distress. Alert. Obese. Eyes: PERRL. No sclera icterus. No conjunctival injection. Neck: No JVD. Trachea midline. Resp: No accessory muscle use. No crackles. Diffuse rhonchi. +Diffuse wheezing  CV: Irregularly irregular rhythm. No murmur. No rub. 1+ BLE edema  Peripheral Pulses: +2 palpable, equal bilaterally   GI: Non-tender. Non-distended. No masses. No organomegaly. Normal bowel sounds. No hernia. Skin: Warm and dry. No nodule on exposed extremities. No rash on exposed extremities. M/S: No cyanosis. No joint deformity. No clubbing. Neuro: Awake. Grossly nonfocal    Psych: Oriented x 3. No anxiety or agitation.       Scheduled Meds:   [START ON 2/20/2023] torsemide  10 mg Oral Daily    dilTIAZem  240 mg Oral Daily    methylPREDNISolone  40 mg IntraVENous Q12H    tamsulosin  0.4 mg Oral BID    losartan  100 mg Oral Daily    gabapentin  600 mg Oral TID    atorvastatin  20 mg Oral Daily    atenolol  100 mg Oral Daily    DULoxetine  60 mg Oral Daily    sodium chloride flush  5-40 mL IntraVENous 2 times per day    cefTRIAXone (ROCEPHIN) IV  1,000 mg IntraVENous Q24H    And    azithromycin  500 mg Oral Q24H    guaiFENesin  600 mg Oral BID    enoxaparin  1 mg/kg SubCUTAneous BID       Continuous Infusions:   sodium chloride         PRN Meds:  ipratropium-albuterol, prochlorperazine, albuterol sulfate HFA, sodium chloride flush, sodium chloride, polyethylene glycol, acetaminophen **OR** acetaminophen, benzonatate, perflutren lipid microspheres, hydrALAZINE      Data:  CBC:   Recent Labs     02/17/23  1055 02/18/23  0432 02/19/23  0411   WBC 6.3 7.3 5.5   HGB 13.4* 13.6 13.3*   HCT 40.4* 40.6 40.1*   MCV 89.2 88.6 89.7   * 121* 137       BMP:   Recent Labs     02/17/23  1055 02/18/23  0432 02/19/23  0411    137 135*   K 4.9 3.4* 4.1    99 100   CO2 24 24 26   BUN 18 18 31*   CREATININE 0.9 0.8 1.2       LIVER PROFILE:   Recent Labs     02/17/23  1055   AST 27   ALT 16   BILITOT 1.8*   ALKPHOS 89       PT/INR: No results for input(s): PROTIME, INR in the last 72 hours. CULTURES  Blood Cultures: pending      RADIOLOGY  CT CHEST PULMONARY EMBOLISM W CONTRAST   Final Result   No evidence of pulmonary embolism. Numerous small ground-glass nodular opacities are noted throughout the   bilateral lower lobes likely reflecting an infectious/inflammatory process. Recommend follow-up chest CT in 3 months to ensure resolution. Enlarged thyroid. Recommend further evaluation with non emergent thyroid   ultrasound. XR CHEST PORTABLE   Final Result   Cardiomegaly and vascular congestion without evidence of interstitial edema. No confluent airspace consolidation. ECHO done 2/18/23   Conclusions      Summary   Technically limited study given tachycardia and afib. Left ventricular systolic function is normal with ejection fraction   estimated at 55-60 %. No regional wall motion abnormalities are noted. There is mild concentric left ventricular hypertrophy with sigmoid shaped   septum. Normal left ventricular diastolic filling pressure. Bi-atrial enlargement. The right ventricle is mildly enlarged. The aortic root is mildly dilated. The ascending aorta is moderately dilated. Mild mitral regurgitation. Mild aortic regurgitation is present. Mild tricuspid regurgitation.    Systolic pulmonary artery pressure (SPAP) estimated at 45 mmHg (RA pressure   15 mmHg), consistent with mild pulmonary hypertension. Mild pulmonic regurgitation present. Consider repeat study when rate controlled if clinically indicate.d      Signature      ------------------------------------------------------------------   Electronically signed by Jorge Sanders DO (Interpreting        Assessment/Plan:  #New onset afib RVR  -cardiology consulted   -with initial rates in 120s --> improved with home dose atenolol  -trop <0.01 x2  -TSH 1.52  -echo done  -start full dose lovenox  -start oral cardizem, dosing per cardiology  -continuous tele     [id number] Score for Atrial Fibrillation Stroke Risk       Risk   Factors   Component Value   C CHF No 0   H HTN Yes 1   A2 Age >= 76 No,  (67 y.o.) 0   D DM No 0   S2 Prior Stroke/TIA No 0   V Vascular Disease No 0   A Age 74-69 Yes,  (67 y.o.) 1   Sc Sex male 0     [id number]  Score   2   Score last updated 2/17/23 3:11 PM EST        #Acute diastolic CHF improbed  -BNP 4k  -no prior echo, ECHO done and results noted  -holding daily hctz and diuresing with lasix 40 daily  -low Na diet, strict I&Os, daily weights  - weight on admission 339--333  - net negative since admission 1,250   -cardiology following     #CAP, likely 2/2 gram positive organism  -CT chest with ground-glass nodular opacities   -procal negative   -sputum cultures pending   -mucinex and prn tessalon  -duonebs and albuterol   -rocephin and Zithromax D#3    #Hemoptysis - scant prior to admission   - noted x3 days blood tinged sputum prior to admission  - he stated it is now tan, no blood since arrival to the ed  - monitor closely with anticoagulation       #Hypoxia  - on 2 liters of oxygen  - no oxygen at baseline  - likely multifactorial 2/2 CAP and CHF    #Asthma with AE  -continue duonebs and albuterol  -start steroids. Steroid has helped a lot.         #Chest pain, atypical  - troponin negative  - cardiology following, consider stress test when recovered from acute illness  - monitor on telemetry     #left lower back and abdomen pain  - likely musculoskeletal in nature from coughing   - +reproducible   - no injury     #Hypokalemia  - replacement ordered BID  - monitor      #Enlarged thyroid  -seen on CT  -non-emergent thyroid US recommended  -TSH pending     #Thrombocytopenia  -platelet count 052  -monitor with anticoagulation     #HTN  -elevated on admission  -continue atenolol and losartan     #HLD  -continue statin       #NICOLE  -BiPAP nightly      #BPH  -continue flomax  - pt having cystoscopy in March      #Neuropathy  -continue cymbalta and gabapentin     #Morbid Obesity  - Body mass index is 46.04 kg/m². - Complicating assessment and treatment. Placing patient at risk for multiple co-morbidities as well as early death and contributing to the patient's presentation.   - Recommend on weight loss. DVT Prophylaxis: Lovenox   Diet: ADULT DIET; Regular;  Low Sodium (2 gm); 2000 ml  Code Status: Full Code          Virgie Arias MD 2/19/2023 12:23 PM

## 2023-02-19 NOTE — ACP (ADVANCE CARE PLANNING)
Advance Care Planning     General Advance Care Planning (ACP) Conversation    Date of Conversation: 2/17/2023  Conducted with: Patient with Decision Making Capacity    Healthcare Decision Maker:    Primary Decision Maker: Blair Hays - Spouse - [phone]  Click here to complete Healthcare Decision Makers including selection of the Healthcare Decision Maker Relationship (ie \"Primary\"). Today we documented Decision Maker(s) consistent with Legal Next of Kin hierarchy. Content/Action Overview:   Has ACP document(s) NOT on file - requested patient to provide  Reviewed DNR/DNI and patient elects Full Code (Attempt Resuscitation)        Length of Voluntary ACP Conversation in minutes:  <16 minutes (Non-Billable)    Lidia Obrien RN

## 2023-02-19 NOTE — PLAN OF CARE
Problem: Discharge Planning  Goal: Discharge to home or other facility with appropriate resources  2/18/2023 2222 by Baljit Mcdermott RN  Outcome: Progressing     Problem: Pain  Goal: Verbalizes/displays adequate comfort level or baseline comfort level  2/18/2023 2222 by Baljit Mcdermott RN  Outcome: Progressing     Problem: Safety - Adult  Goal: Free from fall injury  2/18/2023 2222 by Baljit Mcdermott RN  Outcome: Progressing     Problem: Skin/Tissue Integrity  Goal: Absence of new skin breakdown  Description: 1. Monitor for areas of redness and/or skin breakdown  2. Assess vascular access sites hourly  3. Every 4-6 hours minimum:  Change oxygen saturation probe site  4. Every 4-6 hours:  If on nasal continuous positive airway pressure, respiratory therapy assess nares and determine need for appliance change or resting period.   Outcome: Progressing

## 2023-02-19 NOTE — PLAN OF CARE
HEART FAILURE CARE PLAN:    Comorbidities Reviewed: Yes   Patient has a past medical history of Anxiety, Asthma, BPH, Dermatitis due to sun, Hearing loss, Hyperlipidemia, Hypertension, Narcolepsy, Osteoarthritis, Sleep apnea, and Superficial peroneal nerve neuropathy. ECHOCARDIOGRAM Reviewed: Yes   Patient's Ejection Fraction (EF) is greater than 40%    Weights Reviewed: Yes   Admission weight: (!) 320 lb (145.2 kg)   Wt Readings from Last 3 Encounters:   02/19/23 (!) 330 lb 1.6 oz (149.7 kg)   12/13/22 (!) 317 lb (143.8 kg)   06/07/22 (!) 325 lb 6 oz (147.6 kg)     Intake & Output Reviewed: Yes     Intake/Output Summary (Last 24 hours) at 2/19/2023 1055  Last data filed at 2/19/2023 4482  Gross per 24 hour   Intake 722 ml   Output 3225 ml   Net -2503 ml     Medications Reviewed: Yes     SCHEDULED HOSPITAL MEDICATIONS:   [START ON 2/20/2023] torsemide  10 mg Oral Daily    methylPREDNISolone  40 mg IntraVENous Q12H    tamsulosin  0.4 mg Oral BID    losartan  100 mg Oral Daily    gabapentin  600 mg Oral TID    atorvastatin  20 mg Oral Daily    atenolol  100 mg Oral Daily    DULoxetine  60 mg Oral Daily    sodium chloride flush  5-40 mL IntraVENous 2 times per day    cefTRIAXone (ROCEPHIN) IV  1,000 mg IntraVENous Q24H    And    azithromycin  500 mg Oral Q24H    guaiFENesin  600 mg Oral BID    enoxaparin  1 mg/kg SubCUTAneous BID    dilTIAZem  60 mg Oral 4 times per day       ACE/ARB/ARNI is REQUIRED for EF </= 37% SYSTOLIC FAILURE:   ACE[de-identified] None  ARB[de-identified] Losartan  ARNI[de-identified] None    Evidenced-Based Beta Blocker is REQUIRED for EF </= 51% SYSTOLIC FAILURE:   [de-identified]None    Diuretics:  [de-identified]  , Furosemide, and Torsemide    Diet Reviewed: Yes   ADULT DIET; Regular;  Low Sodium (2 gm); 2000 ml    Goal of Care Reviewed: Yes   Patient and/or Family's stated Goal of Care this Admission: reduce shortness of breath, increase activity tolerance, better understand heart failure and disease management, be more comfortable, and reduce lower extremity edema prior to discharge.

## 2023-02-19 NOTE — FLOWSHEET NOTE
02/19/23 1147 02/19/23 1435   Vital Signs   Temp  --  97.7 °F (36.5 °C)   Temp Source  --  Oral   Heart Rate  --  100   Heart Rate Source  --  Monitor   Resp  --  16   BP  --  135/87   MAP (Calculated)  --  103   BP Location  --  Left upper arm   BP Method  --  Automatic   Patient Position  --  High fowlers   Level of Consciousness  --  0   MEWS Score  --  1   Oxygen Therapy   SpO2  --  96 %   Pulse Oximetry Type  --  Continuous   Pulse Oximeter Device Mode  --  Intermittent   Afternoon vitals taken. Meds given per MAR. Patient stable.      Fermin Damon, RN

## 2023-02-19 NOTE — PROGRESS NOTES
Resting quietly with continuous pulse oximetry low 90%'s on home Bi-PAP. Call in easy reach. Continue to monitor closely.   Vera Blakely RN

## 2023-02-19 NOTE — CARE COORDINATION
Case Management Assessment  Initial Evaluation    Date/Time of Evaluation: 2/19/2023 4:55 PM  Assessment Completed by: Kenyetta Souza RN    If patient is discharged prior to next notation, then this note serves as note for discharge by case management.    Patient Name: Nick Hawkins                   YOB: 1955  Diagnosis: Cardiomegaly [I51.7]  Shortness of breath [R06.02]  Wheezing [R06.2]  Atrial fibrillation with RVR (HCC) [I48.91]  Atrial fibrillation, unspecified type (HCC) [I48.91]  Pneumonia due to infectious organism, unspecified laterality, unspecified part of lung [J18.9]                   Date / Time: 2/17/2023 10:45 AM    Patient Admission Status: Inpatient   Readmission Risk (Low < 19, Mod (19-27), High > 27): Readmission Risk Score: 9.8    Current PCP: JASEN London CNP  PCP verified by CM? Yes (LANDON Sahu)    Chart Reviewed: Yes      History Provided by: Patient  Patient Orientation: Alert and Oriented    Patient Cognition: Alert    Hospitalization in the last 30 days (Readmission):  No    If yes, Readmission Assessment in CM Navigator will be completed.    Advance Directives:      Code Status: Full Code   Patient's Primary Decision Maker is: Legal Next of Kin    Primary Decision Maker: Amber Hawkins - Spouse - [phone]    Discharge Planning:    Patient lives with: Spouse/Significant Other Type of Home: House  Primary Care Giver: Self  Patient Support Systems include: Spouse/Significant Other   Current Financial resources: Medicare, Other (Comment) ([employer])  Current community resources: None  Current services prior to admission: C-pap, Home Bipap            Current DME:              Type of Home Care services:  None    ADLS  Prior functional level: Independent in ADLs/IADLs  Current functional level: Independent in ADLs/IADLs    PT AM-PAC:   /24  OT AM-PAC:   /24    Family can provide assistance at DC: Yes  Would you like Case Management to discuss the discharge plan with  any other family members/significant others, and if so, who? Yes (wife)  Plans to Return to Present Housing: Yes  Other Identified Issues/Barriers to RETURNING to current housing: None  Potential Assistance needed at discharge: Other (Comment) (help with Medicare application)            Potential DME:    Patient expects to discharge to: House  Plan for transportation at discharge: Family    Financial    Payor: Isabelle Donaldson / Plan: [facility] / Product Type: *No Product type* /     Does insurance require precert for SNF: Yes    Potential assistance Purchasing Medications: Yes  Meds-to-Beds request:        [address] Omer Bass [phone] - F [phone]  [address] [facility] [address]  Phone: [phone] Fax: [phone]    [facility] [address] [facility] [address] [phone] Tayo Kaurred [phone]  [place] [id number]  Phone: [phone] Fax: [phone]      Notes:    Factors facilitating achievement of predicted outcomes: Family support, Motivated, Cooperative, and Pleasant    Barriers to discharge: None    Additional Case Management Notes: Reviewed chart and met with pt and wife at bedside. Role of CM explained.  lives home with wife who assist if needed.  is IPTA with all care. Denies service,  has home CPAP. Anticipate no needs but will follow. The Plan for Transition of Care is related to the following treatment goals of Cardiomegaly [I51.7]  Shortness of breath [R06.02]  Wheezing [R06.2]  Atrial fibrillation with RVR (HCC) [I48.91]  Atrial fibrillation, unspecified type ([address] [I48.91]  Pneumonia due to infectious organism, unspecified laterality, unspecified part of lung [B06.2]    IF APPLICABLE: The Patient and/or patient representative Rodriguez Yen and his family were provided with a choice of provider and agrees with the discharge plan.  Freedom of choice list with basic dialogue that supports the patient's individualized plan of care/goals and shares the quality data associated with the providers was provided to:     Patient Representative Name:       The Patient and/or Patient Representative Agree with the Discharge Plan?       Keith Juarez RN  Case Management Department  Ph: [phone] Fax: [phone]

## 2023-02-19 NOTE — FLOWSHEET NOTE
02/19/23 0934   Vital Signs   Temp 97.7 °F (36.5 °C)   Temp Source Oral   Heart Rate 85   Heart Rate Source Monitor   Resp 19   /71   MAP (Calculated) 86   BP Location Right upper arm   Patient Position Semi fowlers   Level of Consciousness 0   MEWS Score 1   Pain Assessment   Pain Assessment 0-10   Pain Level 0   Oxygen Therapy   SpO2 95 %   O2 Device None (Room air)   O2 Flow Rate (L/min) 0 L/min       Pt assessment complete; see flow sheets. Vitals completed. No s/s of distress. Educated on CHF and importance of fluid restriction. Wife at bedside; updated on care plan. Meds given per Mar. Pt stable.     Donna Lim RN

## 2023-02-19 NOTE — PROGRESS NOTES
[facility]  progress  [phone]      Chief Complaint   Patient presents with    Shortness of Breath     Pt reports 4 day history of cough/congestion with SOB starting last night. Went to PCP today, tested negative for COVID so she referred him to ER for further evaluation. History of Present Illness:  Darryle Pinion is a 77 y.o. patient who presented to the hospital with complaints of shortness of breath. I have been asked to provide consultation regarding further management and testing. Subjective: no acute events overnight. No chest pain or pressure. Feels better today. Still coughing. Past Medical History:   has a past medical history of Anxiety, Asthma, BPH, Dermatitis due to sun, Hearing loss, Hyperlipidemia, Hypertension, Narcolepsy, Osteoarthritis, Sleep apnea, and Superficial peroneal nerve neuropathy. Surgical History:   has a past surgical history that includes Cholecystectomy; Appendectomy; Colonoscopy (12/19/11); Total shoulder arthroplasty (6/19/2012); knee surgery (Right, 04/22/2016); Total knee arthroplasty (Left, 07/19/2016); and other surgical history (Right, 09/01/2016). Social History:   reports that he quit smoking about 24 years ago. His smoking use included cigarettes. He has a 96.00 pack-year smoking history. He has never used smokeless tobacco. He reports that he does not drink alcohol and does not use drugs. Family History:  No family history of premature coronary artery disease, aortic disease, or valve disease. Family history in [de-identified] and9 0s    Home Medications:  Were reviewed and are listed in nursing record. and/or listed below  Prior to Admission medications    Medication Sig Start Date End Date Taking?  Authorizing Provider   DULoxetine (CYMBALTA) 60 MG extended release capsule TAKE 1 CAPSULE EVERY DAY 11/28/22   JASEN Oliver - CNP   gabapentin (NEURONTIN) 300 MG capsule TAKE 2 CAPSULES THREE TIMES DAILY 11/28/22 2/28/23  Peggy Nunez APRN - CNP   losartan (COZAAR) 100 MG tablet TAKE 1 TABLET EVERY DAY 11/28/22   Juan C Castrole, APRN - CNP   albuterol sulfate HFA (VENTOLIN HFA) 108 (90 Base) MCG/ACT inhaler Inhale 2 puffs into the lungs 4 times daily as needed for Wheezing 9/27/22   Juan C Arredondordle, JASEN - CNP   ipratropium-albuterol (DUONEB) 0.5-2.5 (3) MG/3ML SOLN nebulizer solution Inhale 3 mLs into the lungs every 4 hours 9/27/22   Juan C Castrole, JASEN - CNP   sulindac (CLINORIL) 200 MG tablet TAKE 1 TABLET EVERY DAY 9/8/22   Juan C Castrole, APRN - CNP   hydroCHLOROthiazide (MICROZIDE) 12.5 MG capsule TAKE 1 CAPSULE EVERY DAY 9/8/22   [facility] Castrole, JASEN - CNP   tamsulosin (FLOMAX) 0.4 MG capsule TAKE 1 CAPSULE TWICE DAILY 8/17/22   Juan C Arredondordle, JASEN - CNP   atorvastatin (LIPITOR) 20 MG tablet TAKE 1 TABLET EVERY DAY 8/5/22   [facility] Castrole, JASEN - CNP   atenolol (TENORMIN) 100 MG tablet TAKE 1 TABLET EVERY DAY 8/5/22   [facility] Castrole, APRN - CNP   sildenafil (VIAGRA) 50 MG tablet Take 50 mg by mouth as needed for Erectile Dysfunction    Historical Provider, MD   Handicap Placard MISC by Does not apply route 12/2/21   Raviaddy Arredondordle, APRN - CNP   Multiple Vitamin (THERA) TABS Take 1 tablet by mouth daily. Historical Provider, MD KRIS HUNG Take  by mouth daily.     Historical Provider, MD        Current Medications:  Current Facility-Administered Medications   Medication Dose Route Frequency Provider Last Rate Last Admin    prochlorperazine (COMPAZINE) injection 10 mg  10 mg IntraVENous Q6H PRN Keisha Rowe MD   10 mg at 02/18/23 0335    potassium chloride (KLOR-CON M) extended release tablet 20 mEq  20 mEq Oral BID  Encarnacion Endy, DO   20 mEq at 02/18/23 1601    methylPREDNISolone sodium (SOLU-MEDROL) injection 40 mg  40 mg IntraVENous Q12H JASEN Contreras - CNP   40 mg at 02/18/23 2333    tamsulosin (FLOMAX) capsule 0.4 mg  0.4 mg Oral BID Marixa Barber PA-C   0.4 mg at 02/18/23 2016    losartan (COZAAR) tablet 100 mg  100 mg Oral Daily Maggie Watt PA-C   100 mg at 02/18/23 0841    gabapentin (NEURONTIN) capsule 600 mg  600 mg Oral TID Maggie Watt PA-C   600 mg at 02/18/23 2016    atorvastatin (LIPITOR) tablet 20 mg  20 mg Oral Daily Marixa Barber PA-C   20 mg at 02/18/23 0841    atenolol (TENORMIN) tablet 100 mg  100 mg Oral Daily Marixa Barber PA-C   100 mg at 02/18/23 0841    albuterol sulfate HFA (PROVENTIL;VENTOLIN;PROAIR) 108 (90 Base) MCG/ACT inhaler 2 puff  2 puff Inhalation 4x Daily PRN Marixa Barber PA-C        DULoxetine (CYMBALTA) extended release capsule 60 mg  60 mg Oral Daily Marixa Barber PA-C   60 mg at 02/18/23 0841    sodium chloride flush 0.9 % injection 5-40 mL  5-40 mL IntraVENous 2 times per day Marixa Barber PA-C   10 mL at 02/18/23 2016    sodium chloride flush 0.9 % injection 10 mL  10 mL IntraVENous PRN Marixa Barber PA-C        0.9 % sodium chloride infusion   IntraVENous PRN Marixa Barber PA-C        polyethylene glycol (GLYCOLAX) packet 17 g  17 g Oral Daily PRN Marixa Barber PA-C        acetaminophen (TYLENOL) tablet 650 mg  650 mg Oral Q6H PRN Marixa Barber PA-C   650 mg at 02/18/23 0856    Or    acetaminophen (TYLENOL) suppository 650 mg  650 mg Rectal Q6H PRN Marixa Barber PA-C        cefTRIAXone (ROCEPHIN) 1,000 mg in sodium chloride 0.9 % 50 mL IVPB (mini-bag)  1,000 mg IntraVENous Q24H Marixa Barber PA-C   Stopped at 02/18/23 1014    And    azithromycin (ZITHROMAX) tablet 500 mg  500 mg Oral Q24H Marixa Barber PA-C   500 mg at 02/18/23 1601    guaiFENesin (MUCINEX) extended release tablet 600 mg  600 mg Oral BID Marixa Barber PA-C   600 mg at 02/18/23 2016    benzonatate (TESSALON) capsule 100 mg  100 mg Oral TID PRN Marixa Barber PA-C        enoxaparin (LOVENOX) injection 150 mg  1 mg/kg SubCUTAneous BID Marixa Barber PA-C   150 mg at 02/18/23 2016    dilTIAZem (CARDIZEM) tablet 60 mg  60 mg Oral 4 times per day Marixa Barber PA-C   60 mg at 02/19/23 0429    perflutren lipid microspheres (DEFINITY) injection 1.5 mL  1.5 mL IntraVENous ONCE PRN Annita Barber PA-C        furosemide (LASIX) injection 40 mg  40 mg IntraVENous Daily Marixa DYLAN Barber   40 mg at 02/18/23 0843    hydrALAZINE (APRESOLINE) injection 10 mg  10 mg IntraVENous Q4H PRN No Lopez MD   10 mg at 02/18/23 0253    ipratropium-albuterol (DUONEB) nebulizer solution 3 mL  1 vial Inhalation 4x daily Benja Robles MD   3 mL at 02/18/23 1936        Allergies:  Celebrex [celecoxib], Elavil [amitriptyline hcl], and Topamax     Review of Systems:     Constitutional: there has been no unanticipated weight loss. +fatigue  Eyes: No visual changes or diplopia. No scleral icterus. ENT: Nno hearing loss or vertigo. No mouth sores or sore throat. +headache  Cardiovascular: Reviewed in HPI  Respiratory: +cough + wheeze  Gastrointestinal: No abdominal pain, appetite loss, blood in stools. No change in bowel or bladder habits. Genitourinary: No dysuria, trouble voiding, or hematuria. Musculoskeletal:  No gait disturbance, weakness or joint complaints. Integumentary: No rash or pruritis. Neurological: No headache, diplopia, change in muscle strength, numbness or tingling. No change in gait, balance, coordination, mood, affect, memory, mentation, behavior. Psychiatric: No anxiety, no depression. Endocrine: No malaise, fatigue or temperature intolerance. No excessive thirst, fluid intake, or urination. No tremor. Hematologic/Lymphatic: No abnormal bruising or bleeding, blood clots or swollen lymph nodes. Allergic/Immunologic: No nasal congestion or hives.       Physical Examination:    Vitals:    02/19/23 0442   BP:    Pulse:    Resp:    Temp:    SpO2: 91%    Weight: (!) 330 lb 1.6 oz (149.7 kg)         General Appearance:  Alert, cooperative, no distress, appears stated age   Head:  Normocephalic, without obvious abnormality, atraumatic   Eyes:  PERRL, conjunctiva/corneas clear       Nose: Nares normal, no drainage or sinus tenderness   Throat: Lips, mucosa, and tongue normal   Neck: Supple, symmetrical, trachea midline, no adenopathy, thyroid: not enlarged, symmetric, no tenderness/mass/nodules, +JVD       Lungs:   +wheezes   Chest Wall:  No tenderness or deformity   Heart:  Irregularly irregular, 2/6 murmur   Abdomen:   Soft, non-tender, bowel sounds active all four quadrants,  no masses, no organomegaly           Extremities: Extremities normal, atraumatic, trace edema   Pulses: 2+ and symmetric   Skin: Skin color, texture, turgor normal, no rashes or lesions   Pysch: Normal mood and affect   Neurologic: Normal gross motor and sensory exam.         Labs  CBC:   Lab Results   Component Value Date/Time    WBC 5.5 02/19/2023 04:11 AM    RBC 4.47 02/19/2023 04:11 AM    HGB 13.3 02/19/2023 04:11 AM    HCT 40.1 02/19/2023 04:11 AM    MCV 89.7 02/19/2023 04:11 AM    RDW 14.3 02/19/2023 04:11 AM     02/19/2023 04:11 AM     CMP:    Lab Results   Component Value Date/Time     02/19/2023 04:11 AM    K 4.1 02/19/2023 04:11 AM     02/19/2023 04:11 AM    CO2 26 02/19/2023 04:11 AM    BUN 31 02/19/2023 04:11 AM    CREATININE 1.2 02/19/2023 04:11 AM    GFRAA >60 12/07/2021 08:01 AM    GFRAA >60 06/01/2013 10:21 AM    AGRATIO 1.6 02/17/2023 10:55 AM    LABGLOM >60 02/19/2023 04:11 AM    GLUCOSE 152 02/19/2023 04:11 AM    PROT 7.2 02/17/2023 10:55 AM    CALCIUM 9.2 02/19/2023 04:11 AM    BILITOT 1.8 02/17/2023 10:55 AM    ALKPHOS 89 02/17/2023 10:55 AM    AST 27 02/17/2023 10:55 AM    ALT 16 02/17/2023 10:55 AM     PT/INR:  No results found for: PTINR  Lab Results   Component Value Date    TROPONINI <0.01 02/17/2023       EKG:  I have reviewed EKG with the following interpretation:  Impression:  atrial fibrillation, right bundle branch block     Echo: none  Stress: none  Cath: none  MRI/EP/Other: none    Old notes reviewed  Telemetry reviewd  Ekg personally reviewed  Chest xray personally reviewed  Echo, cath, and   Medications and labs reviewed  moderate complexity/medical decision making due to extensive data review, extensive history review, independent review of data, potential for decompensation   moderate risk due to acute illness, evaluation of drug-drug interactions, medication management and diagnostic interventions  Discussed with wife    Assessment  Patient Active Problem List   Diagnosis    Arthritis    Anxiety    Mild intermittent asthma without complication    Colon polyp    Testosterone deficiency    left TSR    Impotence due to erectile dysfunction    Uncontrolled hypertension    Primary narcolepsy without cataplexy    Benign non-nodular prostatic hyperplasia with lower urinary tract symptoms    Hypercholesteremia    Neuropathy involving both lower extremities    Vitamin D deficiency    Morbid obesity with BMI of 40.0-44.9, adult (HCC)    NICOLE (obstructive sleep apnea)    Status post total right knee replacement    Atrial fibrillation (HCC)    Acute congestive heart failure ([facility] [address]    RBBB    Cardiomegaly    Pneumonia due to infectious organism    Shortness of breath    Moderate persistent asthma with exacerbation         Plan:    I had the opportunity to review the clinical symptoms and presentation of Alena Robb. Assessment/Plan:  Acute hypoxic respiratory failure  - new problem  - likely secondary to uri and hefpef  Plan:  - transition to torsemide 10 mg tomorrow   - stop abx    2. New onset atrial fibrillation  . [id number] of 3  - tsh normal  Plan:  - continue anticoagulation and rate control   - echo with valvular disease, poor quality study   - continue AV jeri blockers, transition to long acting calcium channel blocker     3. Valvular heart disease and aortic dilation   - new problem  Plan:  - fwill need outpatient cardiology follow up     4. Atypical left sided chest discomfort  - - worsens with coughing,  Troponin negative  Plan:  - consider stress test when recovered from acute illness    5.  Headache and backpain  - new problem  Plan:  - per primary I will address the patient's cardiac risk factors and adjusted pharmacologic treatment as needed. In addition, I have reinforced the need for patient directed risk factor modification. Tobacco use was discussed with the patient and educated on the negative effects. I have asked the patient to not utilize these agents. Thank you for allowing to us to participate in the care or Mercedes Morgan. Further evaluation will be based upon the patient's clinical course and testing results. All questions and concerns were addressed to the patient/family. Alternatives to my treatment were discussed. The note was completed using EMR. Every effort was made to ensure accuracy; however, inadvertent computerized transcription errors may be present. All questions and concerns were addressed to the patient/family. Alternatives to my treatment were discussed. The note was completed using EMR. Every effort was made to ensure accuracy; however, inadvertent computerized transcription errors may be present.   Jeremy Jones DO, MD 2/19/2023 8:53 AM

## 2023-02-19 NOTE — PROGRESS NOTES
RT Inhaler-Nebulizer Bronchodilator Protocol Note    There is a bronchodilator order in the chart from a provider indicating to follow the RT Bronchodilator Protocol and there is an Initiate RT Inhaler-Nebulizer Bronchodilator Protocol order as well (see protocol at bottom of note). CXR Findings:  No results found. The findings from the last RT Protocol Assessment were as follows:   History Pulmonary Disease: Chronic pulmonary disease  Respiratory Pattern: Regular pattern and RR 12-20 bpm  Breath Sounds: Slightly diminished and/or crackles  Cough: Strong, spontaneous, non-productive  Indication for Bronchodilator Therapy: On home bronchodilators  Bronchodilator Assessment Score: 4    Aerosolized bronchodilator medication orders have been revised according to the RT Inhaler-Nebulizer Bronchodilator Protocol below. Respiratory Therapist to perform RT Therapy Protocol Assessment initially then follow the protocol. Repeat RT Therapy Protocol Assessment PRN for score 0-3 or on second treatment, BID, and PRN for scores above 3. No Indications - adjust the frequency to every 6 hours PRN wheezing or bronchospasm, if no treatments needed after 48 hours then discontinue using Per Protocol order mode. If indication present, adjust the RT bronchodilator orders based on the Bronchodilator Assessment Score as indicated below. Use Inhaler orders unless patient has one or more of the following: on home nebulizer, not able to hold breath for 10 seconds, is not alert and oriented, cannot activate and use MDI correctly, or respiratory rate 25 breaths per minute or more, then use the equivalent nebulizer order(s) with same Frequency and PRN reasons based on the score. If a patient is on this medication at home then do not decrease Frequency below that used at home.     0-3 - enter or revise RT bronchodilator order(s) to equivalent RT Bronchodilator order with Frequency of every 4 hours PRN for wheezing or increased work of breathing using Per Protocol order mode. 4-6 - enter or revise RT Bronchodilator order(s) to two equivalent RT bronchodilator orders with one order with BID Frequency and one order with Frequency of every 4 hours PRN wheezing or increased work of breathing using Per Protocol order mode. 7-10 - enter or revise RT Bronchodilator order(s) to two equivalent RT bronchodilator orders with one order with TID Frequency and one order with Frequency of every 4 hours PRN wheezing or increased work of breathing using Per Protocol order mode. 11-13 - enter or revise RT Bronchodilator order(s) to one equivalent RT bronchodilator order with QID Frequency and an Albuterol order with Frequency of every 4 hours PRN wheezing or increased work of breathing using Per Protocol order mode. Greater than 13 - enter or revise RT Bronchodilator order(s) to one equivalent RT bronchodilator order with every 4 hours Frequency and an Albuterol order with Frequency of every 2 hours PRN wheezing or increased work of breathing using Per Protocol order mode.        Electronically signed by Natasha De La Cruz RCP on 2/19/2023 at 11:57 AM

## 2023-02-20 VITALS
SYSTOLIC BLOOD PRESSURE: 147 MMHG | HEART RATE: 77 BPM | WEIGHT: 315 LBS | HEIGHT: 71 IN | TEMPERATURE: 98.1 F | OXYGEN SATURATION: 94 % | BODY MASS INDEX: 44.1 KG/M2 | RESPIRATION RATE: 20 BRPM | DIASTOLIC BLOOD PRESSURE: 96 MMHG

## 2023-02-20 PROBLEM — I50.31 ACUTE DIASTOLIC CHF (CONGESTIVE HEART FAILURE) (HCC): Status: ACTIVE | Noted: 2023-02-20

## 2023-02-20 PROBLEM — E04.9 ENLARGED THYROID: Status: ACTIVE | Noted: 2023-02-20

## 2023-02-20 PROBLEM — R91.8 PULMONARY NODULES: Status: ACTIVE | Noted: 2023-02-20

## 2023-02-20 LAB
ANION GAP SERPL CALCULATED.3IONS-SCNC: 9 MMOL/L (ref 3–16)
BASOPHILS ABSOLUTE: 0 K/UL (ref 0–0.2)
BASOPHILS RELATIVE PERCENT: 0.1 %
BUN BLDV-MCNC: 32 MG/DL (ref 7–20)
CALCIUM SERPL-MCNC: 8.7 MG/DL (ref 8.3–10.6)
CHLORIDE BLD-SCNC: 102 MMOL/L (ref 99–110)
CO2: 26 MMOL/L (ref 21–32)
CREAT SERPL-MCNC: 1 MG/DL (ref 0.8–1.3)
EOSINOPHILS ABSOLUTE: 0 K/UL (ref 0–0.6)
EOSINOPHILS RELATIVE PERCENT: 0 %
GFR SERPL CREATININE-BSD FRML MDRD: >60 ML/MIN/{1.73_M2}
GLUCOSE BLD-MCNC: 123 MG/DL (ref 70–99)
HCT VFR BLD CALC: 39.5 % (ref 40.5–52.5)
HEMOGLOBIN: 13.1 G/DL (ref 13.5–17.5)
LYMPHOCYTES ABSOLUTE: 0.5 K/UL (ref 1–5.1)
LYMPHOCYTES RELATIVE PERCENT: 5.1 %
MCH RBC QN AUTO: 29.5 PG (ref 26–34)
MCHC RBC AUTO-ENTMCNC: 33.1 G/DL (ref 31–36)
MCV RBC AUTO: 89 FL (ref 80–100)
MONOCYTES ABSOLUTE: 0.4 K/UL (ref 0–1.3)
MONOCYTES RELATIVE PERCENT: 4.4 %
NEUTROPHILS ABSOLUTE: 8.9 K/UL (ref 1.7–7.7)
NEUTROPHILS RELATIVE PERCENT: 90.4 %
PDW BLD-RTO: 14.5 % (ref 12.4–15.4)
PLATELET # BLD: 149 K/UL (ref 135–450)
PMV BLD AUTO: 9.9 FL (ref 5–10.5)
POTASSIUM REFLEX MAGNESIUM: 4.2 MMOL/L (ref 3.5–5.1)
PRO-BNP: 2798 PG/ML (ref 0–124)
RBC # BLD: 4.44 M/UL (ref 4.2–5.9)
SODIUM BLD-SCNC: 137 MMOL/L (ref 136–145)
WBC # BLD: 9.8 K/UL (ref 4–11)

## 2023-02-20 PROCEDURE — 6370000000 HC RX 637 (ALT 250 FOR IP)

## 2023-02-20 PROCEDURE — 99233 SBSQ HOSP IP/OBS HIGH 50: CPT | Performed by: INTERNAL MEDICINE

## 2023-02-20 PROCEDURE — 6370000000 HC RX 637 (ALT 250 FOR IP): Performed by: INTERNAL MEDICINE

## 2023-02-20 PROCEDURE — 36415 COLL VENOUS BLD VENIPUNCTURE: CPT

## 2023-02-20 PROCEDURE — 83880 ASSAY OF NATRIURETIC PEPTIDE: CPT

## 2023-02-20 PROCEDURE — 2580000003 HC RX 258

## 2023-02-20 PROCEDURE — 80048 BASIC METABOLIC PNL TOTAL CA: CPT

## 2023-02-20 PROCEDURE — 6360000002 HC RX W HCPCS

## 2023-02-20 PROCEDURE — 6360000002 HC RX W HCPCS: Performed by: NURSE PRACTITIONER

## 2023-02-20 PROCEDURE — 85025 COMPLETE CBC W/AUTO DIFF WBC: CPT

## 2023-02-20 RX ORDER — GUAIFENESIN 600 MG/1
600 TABLET, EXTENDED RELEASE ORAL 2 TIMES DAILY
Qty: 20 TABLET | Refills: 0 | Status: ON HOLD | OUTPATIENT
Start: 2023-02-20 | End: 2023-02-25 | Stop reason: ALTCHOICE

## 2023-02-20 RX ORDER — LEVOFLOXACIN 750 MG/1
750 TABLET ORAL DAILY
Qty: 5 TABLET | Refills: 0 | Status: ON HOLD | OUTPATIENT
Start: 2023-02-20 | End: 2023-02-25 | Stop reason: ALTCHOICE

## 2023-02-20 RX ORDER — TORSEMIDE 20 MG/1
20 TABLET ORAL DAILY
Qty: 30 TABLET | Refills: 3 | Status: ON HOLD | OUTPATIENT
Start: 2023-02-21 | End: 2023-02-26 | Stop reason: HOSPADM

## 2023-02-20 RX ORDER — TORSEMIDE 20 MG/1
20 TABLET ORAL DAILY
Status: DISCONTINUED | OUTPATIENT
Start: 2023-02-21 | End: 2023-02-20 | Stop reason: HOSPADM

## 2023-02-20 RX ORDER — BENZONATATE 100 MG/1
100 CAPSULE ORAL 3 TIMES DAILY PRN
Qty: 21 CAPSULE | Refills: 0 | Status: SHIPPED | OUTPATIENT
Start: 2023-02-20 | End: 2023-02-27

## 2023-02-20 RX ORDER — TORSEMIDE 20 MG/1
10 TABLET ORAL ONCE
Status: COMPLETED | OUTPATIENT
Start: 2023-02-20 | End: 2023-02-20

## 2023-02-20 RX ORDER — DILTIAZEM HYDROCHLORIDE 240 MG/1
240 CAPSULE, COATED, EXTENDED RELEASE ORAL DAILY
Qty: 30 CAPSULE | Refills: 3 | Status: SHIPPED | OUTPATIENT
Start: 2023-02-21

## 2023-02-20 RX ORDER — PREDNISONE 10 MG/1
TABLET ORAL
Qty: 30 TABLET | Refills: 0 | Status: ON HOLD | OUTPATIENT
Start: 2023-02-20 | End: 2023-02-25 | Stop reason: ALTCHOICE

## 2023-02-20 RX ADMIN — TAMSULOSIN HYDROCHLORIDE 0.4 MG: 0.4 CAPSULE ORAL at 08:50

## 2023-02-20 RX ADMIN — METHYLPREDNISOLONE SODIUM SUCCINATE 40 MG: 40 INJECTION, POWDER, FOR SOLUTION INTRAMUSCULAR; INTRAVENOUS at 13:25

## 2023-02-20 RX ADMIN — DILTIAZEM HYDROCHLORIDE 240 MG: 240 CAPSULE, COATED, EXTENDED RELEASE ORAL at 08:50

## 2023-02-20 RX ADMIN — Medication 10 ML: at 08:52

## 2023-02-20 RX ADMIN — ATORVASTATIN CALCIUM 20 MG: 10 TABLET, FILM COATED ORAL at 08:50

## 2023-02-20 RX ADMIN — TORSEMIDE 10 MG: 20 TABLET ORAL at 08:50

## 2023-02-20 RX ADMIN — GUAIFENESIN 600 MG: 600 TABLET, EXTENDED RELEASE ORAL at 08:50

## 2023-02-20 RX ADMIN — GABAPENTIN 600 MG: 300 CAPSULE ORAL at 08:50

## 2023-02-20 RX ADMIN — ATENOLOL 100 MG: 50 TABLET ORAL at 08:49

## 2023-02-20 RX ADMIN — CEFTRIAXONE SODIUM 1000 MG: 1 INJECTION, POWDER, FOR SOLUTION INTRAMUSCULAR; INTRAVENOUS at 08:53

## 2023-02-20 RX ADMIN — METHYLPREDNISOLONE SODIUM SUCCINATE 40 MG: 40 INJECTION, POWDER, FOR SOLUTION INTRAMUSCULAR; INTRAVENOUS at 01:54

## 2023-02-20 RX ADMIN — GABAPENTIN 600 MG: 300 CAPSULE ORAL at 13:25

## 2023-02-20 RX ADMIN — ENOXAPARIN SODIUM 150 MG: 150 INJECTION SUBCUTANEOUS at 08:56

## 2023-02-20 RX ADMIN — DULOXETINE HYDROCHLORIDE 60 MG: 60 CAPSULE, DELAYED RELEASE ORAL at 08:50

## 2023-02-20 RX ADMIN — TORSEMIDE 10 MG: 20 TABLET ORAL at 10:52

## 2023-02-20 RX ADMIN — LOSARTAN POTASSIUM 100 MG: 100 TABLET, FILM COATED ORAL at 08:50

## 2023-02-20 NOTE — PROGRESS NOTES
[address]   Progress Note  Cardiology      HPI: Mr. Susy Barros is being seen today for f/u CHF and afib. He feels much better and denies specific complaints. Tele reviewed afib 78bpm        Physical Examination:    Vitals:    02/20/23 0303   BP: (!) 136/94   Pulse: 94   Resp: 20   Temp: 98.4 °F (36.9 °C)   SpO2: 94%          Constitutional and General Appearance: NAD   Respiratory:  Normal excursion and expansion without use of accessory muscles  Resp Auscultation: +scattered wheezing bilaterally  Cardiovascular: The apical impulses not displaced  Heart tones are crisp and normal; +Irregularly irregular  Cervical veins are not engorged  The carotid upstroke is normal in amplitude and contour without delay or bruit  Normal S1S2, No S3, No Murmur  Peripheral pulses are symmetrical and full  There is no clubbing, cyanosis of the extremities.   1+ BLE edema  Pedal Pulses: 2+ and equal   Abdomen:  No masses or tenderness  Liver/Spleen: No Abnormalities Noted  Neurological/Psychiatric:  Alert and oriented in all spheres  Moves all extremities well  No abnormalities of mood, affect, memory, mentation, or behavior are noted  Skin: warm and dry      Lab Results   Component Value Date    WBC 9.8 02/20/2023    HGB 13.1 (L) 02/20/2023    HCT 39.5 (L) 02/20/2023    MCV 89.0 02/20/2023     02/20/2023     Lab Results   Component Value Date    CREATININE 1.0 02/20/2023    BUN 32 (H) 02/20/2023     02/20/2023    K 4.2 02/20/2023     02/20/2023    CO2 26 02/20/2023     Lab Results   Component Value Date    INR 0.98 08/24/2016    PROTIME 11.2 08/24/2016           [id number] Score for Atrial Fibrillation Stroke Risk    Risk   Factors   Component Value   C CHF No 0   H HTN Yes 1   A2 Age >= 76 No,  (77 y.o.) 0   D DM No 0   S2 Prior Stroke/TIA No 0   V Vascular Disease No 0   A Age 74-69 Yes,  (67 y.o.) 1   Sc Sex male 0     [id number]  Score   2   Score last updated 2/17/23 3:54 PM EST        ECHO 2/18/23 Summary   Technically limited study given tachycardia and afib. Left ventricular systolic function is normal with ejection fraction   estimated at 55-60 %. No regional wall motion abnormalities are noted. There is mild concentric left ventricular hypertrophy with sigmoid shaped septum. Normal left ventricular diastolic filling pressure. Bi-atrial enlargement. The right ventricle is mildly enlarged. The aortic root is mildly dilated. The ascending aorta is moderately dilated. Mild mitral regurgitation. Mild aortic regurgitation is present. Mild tricuspid regurgitation. Systolic pulmonary artery pressure (SPAP) estimated at 45 mmHg (RA pressure   15 mmHg), consistent with mild pulmonary hypertension. Mild pulmonic regurgitation present. Assessment:  Maria Luz Membreno is a 77 y.o. patient who presented to [facility] with complaints of SOB. He has a PMH of HTN, HLD, Sleep Apnea, Anxiety, and BPH. No known cardiac history. EKG 8/24/16 SB 54 bpm.  No additional cardiac testing in chart. Mr. Julián Yun now presents with complaints of cough/congestions for 4 days and worsening ANGULO. Also c/o chest tightness with SOB and cough with occasional reddish phlegm. He c/o worse SOB laying down and \"racing heart beat\" yesterday. He has baseline LE edema. He had virtual visit with PCP and then went to PCP office today for evaluation and was Covid Negative. He was found to be tachycardic and low oxygen saturation and referred to ER. Admitting EKG AFib  bpm, RBBB (SB in 8/16). Admitting CXR Cardiomegaly and vascular congestion without evidence of interstitial edema. Admitting CT Chest negative pulmonary embolism Numerous small ground-glass nodular opacities are noted throughout the bilateral lower lobes likely reflecting an infectious/inflammatory process. Enlarged thyroid. Bonita <0.01, Pro-BNP 4870; TSH 1.52, normal renal fcn and CBC. Platelets 610.  He received 40mg IV lasix and atenolol 100mg po x 1.   Diagnosis of new-onset atrial fibrillation and diastolic CHF, uncontrolled HTN, and new RBBB plus possible respiratory infection in older male with HTN and HLD. Recs:  Diuresed well with 10# weight loss; Net negative 3.7L. Switched from IV lasix to demadex 20gm daily today. Newly dx diastolic CHF from new-onset afib with both conditions potentially harmful and life threatening requiring adequate treatment to minimize CVA risk and increased mortality with CHF. Continue cardizem 240mg qd and atenolol 100mg daily for HR control which is improved. I would switch from lovenox full dose for [facility] to eliqius 5mg BID. Defer to IM team.   Abx and inhalers per IM team  ECHO reviewed EF normal with LVH; mild MR/AR/TR/MA. OK for d/c from cardiac standpoint today if OK with IM doc. Will make f/u OV appt.   Signing off    Patient Active Problem List   Diagnosis    Arthritis    Anxiety    Mild intermittent asthma without complication    Colon polyp    Testosterone deficiency    left TSR    Impotence due to erectile dysfunction    Uncontrolled hypertension    Primary narcolepsy without cataplexy    Benign non-nodular prostatic hyperplasia with lower urinary tract symptoms    Hypercholesteremia    Neuropathy involving both lower extremities    Vitamin D deficiency    Morbid obesity with BMI of 40.0-44.9, adult (HCC)    NICOLE (obstructive sleep apnea)    Status post total right knee replacement    Atrial fibrillation (HCC)    Acute congestive heart failure (HCC)    RBBB    Cardiomegaly    Pneumonia due to infectious organism    Shortness of breath    Moderate persistent asthma with exacerbation

## 2023-02-20 NOTE — PLAN OF CARE
[facility]   HEART FAILURE PROGRAM      NAME:  [address] RECORD NUMBER:  [id number]  AGE: 77 y.o.    GENDER: male  : 1955  TODAY'S DATE:  2023    Subjective:     VISIT TYPE: Education     ADMITTING PHYSICIAN: Rajendra Meadows MD    ADMIT DATE: 2023    PAST MEDICAL HISTORY:      Diagnosis Date    Anxiety     Asthma     BPH     prostate ,     Dermatitis due to sun     severe    Hearing loss     decreased hearing left ear    Hyperlipidemia     Hypertension     pos GXT 3/01, neg GXT 3/07    Narcolepsy     Osteoarthritis     Sleep apnea     bipap    Superficial peroneal nerve neuropathy     bilateral     SCHEDULED MEDICATIONS:   [START ON 2023] torsemide  20 mg Oral Daily    dilTIAZem  240 mg Oral Daily    methylPREDNISolone  40 mg IntraVENous Q12H    tamsulosin  0.4 mg Oral BID    losartan  100 mg Oral Daily    gabapentin  600 mg Oral TID    atorvastatin  20 mg Oral Daily    atenolol  100 mg Oral Daily    DULoxetine  60 mg Oral Daily    sodium chloride flush  5-40 mL IntraVENous 2 times per day    cefTRIAXone (ROCEPHIN) IV  1,000 mg IntraVENous Q24H    guaiFENesin  600 mg Oral BID    enoxaparin  1 mg/kg SubCUTAneous BID     Objective:     ADMISSION DIAGNOSIS:   Cardiomegaly [I51.7]  Shortness of breath [R06.02]  Wheezing [R06.2]  Atrial fibrillation with RVR ([facility]) [I48.91]  Atrial fibrillation, unspecified type ([facility] [address] [I48.91]  Pneumonia due to infectious organism, unspecified laterality, unspecified part of lung [J18.9]    WEIGHTS:    Admission weight: (!) 320 lb (145.2 kg)   Wt Readings from Last 3 Encounters:   23 (!) 321 lb 12.8 oz (146 kg)   22 (!) 317 lb (143.8 kg)   22 (!) 325 lb 6 oz (147.6 kg)     INTAKE & OUTPUT:   Intake/Output Summary (Last 24 hours) at 2023 1239  Last data filed at 2023 1219  Gross per 24 hour   Intake 1698 ml   Output 1875 ml   Net -177 ml     ECHOCARDIOGRAM: EF is 55-60% on 02/2023    Assessment:     Patient sitting in bed at this time on room air. Pt denies shortness of breath; no complaints of chest pain. Patient stated at home he will become short of breath, weight gain, swelling in BLE, tired, and abdominal bloating. Patient lives at home with spouse who is able to help care for him. Patient still able to perform ADL's. Requires no oxygen at home but does have a CPAP. Provided the Patient with Heart Failure education on: signs/symptoms to monitor, medications, daily weights, low sodium diet, 2000 ml fluid restriction, and activity. Patient has a scale at home and is able to weigh his self. Stated he is only currently weighing his self one time per week. Reinforced the importance of daily weights and to report weight gain of 3 lbs in one day and 5 lbs in one week to Provider. Provided 1/L labeled water pitcher to monitor fluid intake. Reviewed foods that were high in sodium to avoid and how to read labels. Patient stated he is currently not following a low sodium diet and puts salt on everything he eats. Recommended Mrs. Gómez alternative to salt. Follow-up appointment scheduled for 2/23/23 at 1140 am with PCP Kate Hernández CNP. Provided Heart Failure nurse number for any further questions/assistance. CONSULTS:   [address]    Patient taking an ACEI/ARB/ARNI: Yes- Cozaar      Patient taking a BETA BLOCKER: Yes- Atenolol      Patient taking ALDACTONE: No     Patient taking Diuretic: No     Patient taking SGLT2: No     SCALE AVAILABLE: Yes     CURRENT DIET: ADULT DIET; Regular;  Low Sodium (2 gm); 2000 ml    Education:     EDUCATION STATUS: Patient Shirley Yang  [x]  Provided both written and verbal education on Heart Failure signs & symptoms  [x]  Received verbal acknowledgment/understanding of Heart Failure related causes  [x]  Provided instructions on daily medications  [x]  Provided instructions to monitor and record weight daily  [x]  Provided instructions to call if weight increases 3 lbs in one day or 5 lbs in one week  [x]  Provided instructions on how to maintain a low sodium diet  [x]  Provided recommendations on activity and exercise    []  Provided recommendations for smoking cessation programs      EDUCATIONAL MATERIALS PROVIDED:    [x]  [facility]): A Patient Education Guide Living with Heart Failure Booklet  [x]  Follow-up Appointment Reminder  [x]  Heart Failure Zones Self-Check Plan  [x]  Weight and Heart Failure Zone Log  [x]  AHA: HF and Your Ejection Fraction Explained  [x]  Sleep Disorders and Your Heart  [x]  AHA: HF Lynn Juancarlos Aids Patients at Home    PATIENT/CAREGIVER TEACHING:   Level of patient/caregiver understanding able to:   [x] Verbalize understanding   [] Demonstrate understanding       [] Teach back        [] Needs reinforcement     []  Other:      TEACHING TIME:  45 minutes       Recommendations:     [x]  Encourage to call Heart Failure [facility] [phone] with any questions or concerns. [x]  Encourage follow-up appointment compliance. Next Appointment: 2/23/23 at 1140 am with PCP Rosalio Grey CNP  [x]  Emphasize daily weights: Instruct patient to call the MD if weight gain of 3 lbs in 1 day or 5 lbs in a week. [x]  Review sodium restriction diet. Encourage patient to not add table salt and avoid foods high in sodium. [x]  Educate further on fluid restriction of 48 oz - 64 oz with labeled pitcher during inpatient admission. [x]  Continue to educate on signs & symptoms of Heart Failure.   []  Other:            Electronically signed by PRINCE Cifuentes, RN  on 2/20/2023 at 12:39 PM

## 2023-02-20 NOTE — DISCHARGE INSTRUCTIONS
Heart Failure Resources:    Heart Failure Interactive Workbook:   Go to [email or web address]/aha-heartfailure for a Free Heart Failure Interactive Workbook provided by [employer]. This interactive workbook will provide information on Healthier Living with Heart Failure. Please copy and paste link into search bar. Use your mouse to scroll through the pages. HF [place] mikel:   Heart Failure Free smart phone mikel available for iPhone and Android download. Use your phone to track sodium intake, fluid intake, symptoms, and weight. Low Sodium Diet:  Go to www. [employer]. org website for [employer] which is Low Sodium! [employer] is a dialysis company, but this website offers free seasonal cookbooks. Each quarter, they will release 25-30 new recipes with a breakdown of calories, sodium, and glucose. Recipes:   Go to [email or web address]/recipes website for free recipes.

## 2023-02-20 NOTE — PROGRESS NOTES
Bedside report and transfer of care given to FAUSTO Knox. Pt currently resting in bed with the call light within reach. Pt denies any other care needs at this time. Pt stable at this time.     Donna Lim RN

## 2023-02-20 NOTE — CARE COORDINATION
Review of chart screened for potential discharge planning needs. Contact with _pt/spouse____(pt/support person)   Role of discharge planner explained with verbalized understanding. Benefits check for Eliquis done w/ OP pharmace. Pt has OOP cost of $397 for Eliquis before deductible. The estimated cost after deductible is met is approximately $47/month. Pt will be provide 30 day trial free through OP pharmacy. DC order noted. IPTA. Lives w/sp. No Needs identified by pt/support person for barriers to discharge. Readmission Risk Score:10%  No discharge needs noted not following. MD and bedside RN please notify CM if needs arise for any discharge interventions.

## 2023-02-20 NOTE — FLOWSHEET NOTE
RN at bedside, patient alert and oriented. Compliant with Fluid restriction. Assessment complete, meds provided.   Discussed plan of care, call light within reach if needed

## 2023-02-20 NOTE — PROGRESS NOTES
Patient's meds delivered to room. Patient and wife given discharge instructions at this time, both state understanding. IV and monitor removed earlier. Patient denies any needs or questions prior to leaving. Patient up and ambulated in room to door, remained 90% with exertion. Request placed for patient to be taken out per wheelchair at this time.

## 2023-02-20 NOTE — PROGRESS NOTES
Patient resting in bed, AM assessment completed. Lungs decreased with exp wheezes noted. BS+. INT x 2. Patient on RA. No acute distress noted. Patient denies any needs. Call light in reach. Will continue to monitor.

## 2023-02-21 ENCOUNTER — TELEPHONE (OUTPATIENT)
Dept: FAMILY MEDICINE CLINIC | Age: 68
End: 2023-02-21

## 2023-02-21 ENCOUNTER — TELEPHONE (OUTPATIENT)
Dept: CARDIOLOGY CLINIC | Age: 68
End: 2023-02-21

## 2023-02-21 DIAGNOSIS — Z79.899 MEDICATION MANAGEMENT: Primary | ICD-10-CM

## 2023-02-21 LAB
BLOOD CULTURE, ROUTINE: NORMAL
CULTURE, BLOOD 2: NORMAL

## 2023-02-21 NOTE — TELEPHONE ENCOUNTER
LM on pt phone to return call. F/U appt made for pt on 3/8/23 @ 11:00 with [facility] at [place]. BMP ordered and pt needs to do in 7-10 days.

## 2023-02-21 NOTE — TELEPHONE ENCOUNTER
Care Transitions Initial Follow Up Call    Outreach made within 2 business days of discharge: Yes    Patient: Lord Degroot Patient : 1955   MRN: [id number]  Reason for Admission: There are no discharge diagnoses documented for the most recent discharge. Discharge Date: 23       Spoke with: Wife    Discharge department/facility: [facility] Patient Contact:  Was patient able to fill all prescriptions: Yes  Was patient instructed to bring all medications to the follow-up visit: Yes  Is patient taking all medications as directed in the discharge summary?  Yes  Does patient understand their discharge instructions: Yes  Does patient have questions or concerns that need addressed prior to 7-14 day follow up office visit: no    Scheduled appointment with PCP within 7-14 days    Follow Up  Future Appointments   Date Time Provider Tosin Shepherd   2023 11:40 AM JASEN Ying CNP [place]  Ping - [place]   2023  9:00 AM Cheryl Burrell, [address]

## 2023-02-23 ENCOUNTER — FOLLOWUP TELEPHONE ENCOUNTER (OUTPATIENT)
Dept: ADMINISTRATIVE | Age: 68
End: 2023-02-23

## 2023-02-23 ENCOUNTER — TELEPHONE (OUTPATIENT)
Dept: PULMONOLOGY | Age: 68
End: 2023-02-23

## 2023-02-23 ENCOUNTER — OFFICE VISIT (OUTPATIENT)
Dept: FAMILY MEDICINE CLINIC | Age: 68
End: 2023-02-23

## 2023-02-23 VITALS
HEART RATE: 64 BPM | WEIGHT: 315 LBS | SYSTOLIC BLOOD PRESSURE: 128 MMHG | TEMPERATURE: 98.3 F | BODY MASS INDEX: 45.61 KG/M2 | DIASTOLIC BLOOD PRESSURE: 84 MMHG | OXYGEN SATURATION: 92 %

## 2023-02-23 DIAGNOSIS — R91.8 PULMONARY NODULES: ICD-10-CM

## 2023-02-23 DIAGNOSIS — Z09 HOSPITAL DISCHARGE FOLLOW-UP: Primary | ICD-10-CM

## 2023-02-23 DIAGNOSIS — I50.31 ACUTE DIASTOLIC CHF (CONGESTIVE HEART FAILURE) (HCC): ICD-10-CM

## 2023-02-23 ASSESSMENT — PATIENT HEALTH QUESTIONNAIRE - PHQ9
SUM OF ALL RESPONSES TO PHQ QUESTIONS 1-9: 0
SUM OF ALL RESPONSES TO PHQ QUESTIONS 1-9: 0
SUM OF ALL RESPONSES TO PHQ9 QUESTIONS 1 & 2: 0
SUM OF ALL RESPONSES TO PHQ QUESTIONS 1-9: 0
2. FEELING DOWN, DEPRESSED OR HOPELESS: 0
SUM OF ALL RESPONSES TO PHQ QUESTIONS 1-9: 0
1. LITTLE INTEREST OR PLEASURE IN DOING THINGS: 0

## 2023-02-23 NOTE — PROGRESS NOTES
Post-Discharge Transitional Care  Follow Up      Franklyn Christianson   YOB: 1955    Date of Office Visit:  2/23/2023  Date of Hospital Admission: 2/17/23  Date of Hospital Discharge: 2/20/23  Risk of hospital readmission (high >=14%. Medium >=10%) :Readmission Risk Score: 9.6      Care management risk score Rising risk (score 2-5) and Complex Care (Scores >=6): No Risk Score On File     Non face to face  following discharge, date last encounter closed (first attempt may have been earlier): 02/21/2023    Call initiated 2 business days of discharge: Yes    ASSESSMENT/PLAN:   Hospital discharge follow-up  -     CA DISCHARGE MEDS RECONCILED W/ CURRENT OUTPATIENT MED LIST  -     Brain Natriuretic Peptide; Future  -     Comprehensive Metabolic Panel; Future  Acute diastolic CHF (congestive heart failure) (HCC)  -     Brain Natriuretic Peptide; Future  -     [facility]  Pulmonary nodules  -     [facility]  Presents today for hospital follow-up. Patient was recently hospitalized from 2/17-2/20 acute CHF, pulmonary nodules, pneumonia, cardiomegaly, enlarged thyroid and A-fib. Reports that since being home he is doing better. Patient continues to be on Levaquin with 2 days left. Patient was discontinued off lisinopril. Patient reports that he was started on torsemide as well as diltiazem. Patient also takes Eliquis twice a day. Patient reports using his inhaler as well as DuoNeb treatments and has tapered dose of prednisone. Patient reports he has been weighing himself daily as well as monitoring his blood pressure and heart rate. Patient has follow-up with cardiologist on 3/8. Labs ordered today and pending. Patient counseled the importance of diet, monitoring fluid and sodium intake as well as taking medications as directed. Patient to follow-up as directed. Medical Decision Making: moderate complexity  Return if symptoms worsen or fail to improve.     On this date 2/23/2023 I have spent 30 minutes reviewing previous notes, test results and face to face with the patient discussing the diagnosis and importance of compliance with the treatment plan as well as documenting on the day of the visit. Subjective:   HPI:  Follow up of Hospital problems/diagnosis(es): Presents today for hospital follow-up. Patient was recently hospitalized from 2/17 to 2/20 for A-fib, pneumonia, CHF, pulmonary nodules, cardiomegaly, enlarged thyroid. Patient presents today with his wife for follow-up. Inpatient course: Discharge summary reviewed- see chart. Interval history/Current status:. Stable    Patient Active Problem List   Diagnosis    Arthritis    Anxiety    Mild intermittent asthma without complication    Colon polyp    Testosterone deficiency    left TSR    Impotence due to erectile dysfunction    Uncontrolled hypertension    Primary narcolepsy without cataplexy    Benign non-nodular prostatic hyperplasia with lower urinary tract symptoms    Hypercholesteremia    Neuropathy involving both lower extremities    Vitamin D deficiency    Morbid obesity with BMI of 40.0-44.9, adult (HCC)    NICOLE (obstructive sleep apnea)    Status post total right knee replacement    Atrial fibrillation (HCC)    Acute congestive heart failure ([address]    RBBB    Cardiomegaly    Pneumonia due to infectious organism    Shortness of breath    Moderate persistent asthma with exacerbation    Acute diastolic CHF (congestive heart failure) ([address]    Pulmonary nodules    Enlarged thyroid       Medications listed as ordered at the time of discharge from hospital     Medication List            Accurate as of February 23, 2023 12:16 PM. If you have any questions, ask your nurse or doctor.                 CONTINUE taking these medications      albuterol sulfate  (90 Base) MCG/ACT inhaler  Commonly known as: Ventolin HFA  Inhale 2 puffs into the lungs 4 times daily as needed for Wheezing     apixaban 5 MG Tabs tablet  Commonly known as: Eliquis  Take 1 tablet by mouth 2 times daily     atenolol 100 MG tablet  Commonly known as: TENORMIN  TAKE 1 TABLET EVERY DAY     atorvastatin 20 MG tablet  Commonly known as: LIPITOR  TAKE 1 TABLET EVERY DAY     benzonatate 100 MG capsule  Commonly known as: TESSALON  Take 1 capsule by mouth 3 times daily as needed for Cough     dilTIAZem 240 MG extended release capsule  Commonly known as: CARDIZEM CD  Take 1 capsule by mouth daily     DULoxetine 60 MG extended release capsule  Commonly known as: CYMBALTA  TAKE 1 CAPSULE EVERY DAY     gabapentin 300 MG capsule  Commonly known as: NEURONTIN  TAKE 2 CAPSULES THREE TIMES DAILY     guaiFENesin 600 MG extended release tablet  Commonly known as: MUCINEX  Take 1 tablet by mouth 2 times daily     Handicap Placard Misc  by Does not apply route     ipratropium-albuterol 0.5-2.5 (3) MG/3ML Soln nebulizer solution  Commonly known as: DUONEB  Inhale 3 mLs into the lungs every 4 hours     levoFLOXacin 750 MG tablet  Commonly known as: Levaquin  Take 1 tablet by mouth daily for 5 days     losartan 100 MG tablet  Commonly known as: COZAAR  TAKE 1 TABLET EVERY DAY     predniSONE 10 MG tablet  Commonly known as: DELTASONE  Take 4 tablets once a day for 3 days, then take 3 tablets once a day for 3 days, then take 2 tablets once a day for 3 days, then take 1 tablet once a day for 3 days, then stop.      SAW PALMETTO     sildenafil 50 MG tablet  Commonly known as: VIAGRA     sulindac 200 MG tablet  Commonly known as: CLINORIL  TAKE 1 TABLET EVERY DAY     tamsulosin 0.4 MG capsule  Commonly known as: FLOMAX  TAKE 1 CAPSULE TWICE DAILY     thera/beta-carotene Tabs     torsemide 20 MG tablet  Commonly known as: DEMADEX  Take 1 tablet by mouth daily                Medications marked \"taking\" at this time  Outpatient Medications Marked as Taking for the 2/23/23 encounter (Office Visit) with JASEN Oliver CNP   Medication Sig Dispense Refill    dilTIAZem (CARDIZEM CD) 240 MG extended release capsule Take 1 capsule by mouth daily 30 capsule 3    benzonatate (TESSALON) 100 MG capsule Take 1 capsule by mouth 3 times daily as needed for Cough 21 capsule 0    guaiFENesin (MUCINEX) 600 MG extended release tablet Take 1 tablet by mouth 2 times daily 20 tablet 0    torsemide (DEMADEX) 20 MG tablet Take 1 tablet by mouth daily 30 tablet 3    apixaban (ELIQUIS) 5 MG TABS tablet Take 1 tablet by mouth 2 times daily 180 tablet 1    predniSONE (DELTASONE) 10 MG tablet Take 4 tablets once a day for 3 days, then take 3 tablets once a day for 3 days, then take 2 tablets once a day for 3 days, then take 1 tablet once a day for 3 days, then stop. 30 tablet 0    levoFLOXacin (LEVAQUIN) 750 MG tablet Take 1 tablet by mouth daily for 5 days 5 tablet 0    DULoxetine (CYMBALTA) 60 MG extended release capsule TAKE 1 CAPSULE EVERY DAY 90 capsule 1    gabapentin (NEURONTIN) 300 MG capsule TAKE 2 CAPSULES THREE TIMES DAILY 540 capsule 0    losartan (COZAAR) 100 MG tablet TAKE 1 TABLET EVERY DAY 90 tablet 1    albuterol sulfate HFA (VENTOLIN HFA) 108 (90 Base) MCG/ACT inhaler Inhale 2 puffs into the lungs 4 times daily as needed for Wheezing 18 g 5    ipratropium-albuterol (DUONEB) 0.5-2.5 (3) MG/3ML SOLN nebulizer solution Inhale 3 mLs into the lungs every 4 hours 360 mL 1    sulindac (CLINORIL) 200 MG tablet TAKE 1 TABLET EVERY DAY 90 tablet 1    tamsulosin (FLOMAX) 0.4 MG capsule TAKE 1 CAPSULE TWICE DAILY 180 capsule 1    atorvastatin (LIPITOR) 20 MG tablet TAKE 1 TABLET EVERY DAY 90 tablet 1    atenolol (TENORMIN) 100 MG tablet TAKE 1 TABLET EVERY DAY 90 tablet 1    sildenafil (VIAGRA) 50 MG tablet Take 50 mg by mouth as needed for Erectile Dysfunction      Handicap Placard MISC by Does not apply route 1 each 0    Multiple Vitamin (THERA) TABS Take 1 tablet by mouth daily. SAW PALMETTO Take  by mouth daily.           Medications patient taking as of now reconciled against medications ordered at time of hospital discharge: Yes    A comprehensive review of systems was negative except for what was noted in the HPI. Objective:    /84   Pulse 64   Temp 98.3 °F (36.8 °C) (Oral)   Wt (!) 327 lb (148.3 kg)   SpO2 92%   BMI 45.61 kg/m²   General Appearance: alert and oriented to person, place and time  Skin: warm and dry  Head: normocephalic and atraumatic  Eyes: pupils equal, round, and reactive to light  ENT: tympanic membrane, external ear and ear canal normal bilaterally, oropharynx clear and moist with normal mucous membranes  Neck: neck supple and non tender without mass   Pulmonary/Chest: no chest wall tenderness and wheezing present-expiratory posteriorly  Cardiovascular: normal rate and regular rhythm  Abdomen: soft, non-tender  Extremities: no cyanosis and no edema  Musculoskeletal: normal range of motion, no joint swelling, deformity or tenderness and   Neurologic: gait and coordination normal and speech normal      An electronic signature was used to authenticate this note.   --Karmen Givens, JASEN - CNP

## 2023-02-23 NOTE — TELEPHONE ENCOUNTER
Npt ref by Caesar Alicia, pulmonary nodules    Please advise on scheduling. 2/17/23  Narrative   EXAMINATION:   CTA OF THE CHEST 2/17/2023 12:41 pm       TECHNIQUE:   CTA of the chest was performed after the administration of intravenous   contrast.  Multiplanar reformatted images are provided for review. MIP   images are provided for review. Automated exposure control, iterative   reconstruction, and/or weight based adjustment of the mA/kV was utilized to   reduce the radiation dose to as low as reasonably achievable. COMPARISON:   None. HISTORY:   ORDERING SYSTEM PROVIDED HISTORY: concern PNA   TECHNOLOGIST PROVIDED HISTORY:   Reason for exam:->concern PNA   Decision Support Exception - unselect if not a suspected or confirmed   emergency medical condition->Emergency Medical Condition (MA)   Reason for Exam: Shortness of Breath (Pt reports 4 day history of   cough/congestion with SOB starting last night. Went to PCP today, tested   negative for COVID so she referred him to ER for further evaluation. )       FINDINGS:   Pulmonary Arteries: Pulmonary arteries are adequately opacified for   evaluation. No evidence of intraluminal filling defect to suggest pulmonary   embolism. Main pulmonary artery is normal in caliber. Mediastinum: No evidence of mediastinal lymphadenopathy. The heart and   pericardium demonstrate no acute abnormality. There is no acute abnormality   of the thoracic aorta. Lungs/pleura: Numerous small ground-glass nodular opacities are noted   throughout the bilateral lower lobes likely reflecting an   infectious/inflammatory process. No evidence of pleural effusion or   pneumothorax. Upper Abdomen: Limited images of the upper abdomen are unremarkable. Soft Tissues/Bones: No acute bone or soft tissue abnormality. The bilateral thyroid lobes are enlarged. Impression   No evidence of pulmonary embolism.        Numerous small ground-glass nodular opacities are noted throughout the   bilateral lower lobes likely reflecting an infectious/inflammatory process. Recommend follow-up chest CT in 3 months to ensure resolution. Enlarged thyroid. Recommend further evaluation with non emergent thyroid   ultrasound.

## 2023-02-23 NOTE — PATIENT INSTRUCTIONS
Please read the healthy family handout that you were given and share it with your family. Please compare this printed medication list with your medications at home to be sure they are the same. If you have any medications that are different please contact us immediately at [phone]. Also review your allergies that we have listed, these may also include medications that you have not been able to tolerate, make sure everything listed is correct. If you have any allergies that are different please contact us immediately at [phone]. You may receive a survey in the mail or by email asking about your experience during your visit today. Please complete and return to us so we know how we are serving you.

## 2023-02-23 NOTE — TELEPHONE ENCOUNTER
Heart Failure Follow-Up Call:    Call within 72 Hours of Discharge: Yes     Patient: Abimael Escamilla   Patient : 1955   MRN: [id number]    Date of discharge: 23    Discharge department/facility: [facility] / [facility]    Discharge Disposition: Home    RARS: Readmission Risk Score: 9.6    Spoke with: Julia Lomeli starting to feel better. Still has weakness but improving each day. No oxygen requirements at discharge. Patient followed up with PCP Ana Paula Hollingsworth CNP today and still doing well. Patient will have urology surgery rescheduled in a month due to recent hospitalization. Patient is weighing his self each day first thing in the morning. This morning he weighed 320 lbs and down 7 lbs since being home. Patient stated he and his wife are following the low sodium diet and monitoring how much fluids he takes in. Provided Heart Failure nurse number again if any further questions/assistance.      Follow Up  Future Appointments   Date Time Provider Tosin Shepherd   2023  8:15 AM MANJINDER Mahmood   3/8/2023 11:00 AM JASEN Omer CNP P [facility]   2023  9:00 AM JASEN Bartholomew CNP       Electronically signed by PRINCE Ang, RN  on 2023 at 12:49 PM

## 2023-02-23 NOTE — TELEPHONE ENCOUNTER
Heart Failure Follow-up Call:     1st Attempt at number [phone]; No Answer- Left HIPAA compliant voicemail with Non-Urgent Heart Failure Resource Line number for call back.     Electronically signed by Amelia Hoff MSN, RN  on 2/23/2023 at 12:40 PM

## 2023-02-25 ENCOUNTER — HOSPITAL ENCOUNTER (INPATIENT)
Age: 68
LOS: 1 days | Discharge: HOME | DRG: 312 | End: 2023-02-26
Attending: EMERGENCY MEDICINE | Admitting: INTERNAL MEDICINE
Payer: MEDICARE

## 2023-02-25 ENCOUNTER — APPOINTMENT (OUTPATIENT)
Dept: GENERAL RADIOLOGY | Age: 68
DRG: 312 | End: 2023-02-25
Payer: MEDICARE

## 2023-02-25 DIAGNOSIS — R55 SYNCOPE AND COLLAPSE: ICD-10-CM

## 2023-02-25 DIAGNOSIS — I48.20 CHRONIC ATRIAL FIBRILLATION (HCC): ICD-10-CM

## 2023-02-25 DIAGNOSIS — N18.9 CHRONIC KIDNEY DISEASE, UNSPECIFIED CKD STAGE: ICD-10-CM

## 2023-02-25 DIAGNOSIS — R00.1 SYMPTOMATIC BRADYCARDIA: Primary | ICD-10-CM

## 2023-02-25 LAB
A/G RATIO: 1.6 (ref 1.1–2.2)
ALBUMIN SERPL-MCNC: 4.2 G/DL (ref 3.4–5)
ALP BLD-CCNC: 74 U/L (ref 40–129)
ALT SERPL-CCNC: 32 U/L (ref 10–40)
ANION GAP SERPL CALCULATED.3IONS-SCNC: 9 MMOL/L (ref 3–16)
AST SERPL-CCNC: 16 U/L (ref 15–37)
BASOPHILS ABSOLUTE: 0 K/UL (ref 0–0.2)
BASOPHILS RELATIVE PERCENT: 0.4 %
BILIRUB SERPL-MCNC: 1.4 MG/DL (ref 0–1)
BILIRUBIN URINE: NEGATIVE
BLOOD, URINE: NEGATIVE
BUN BLDV-MCNC: 30 MG/DL (ref 7–20)
CALCIUM SERPL-MCNC: 9.3 MG/DL (ref 8.3–10.6)
CHLORIDE BLD-SCNC: 103 MMOL/L (ref 99–110)
CLARITY: CLEAR
CO2: 27 MMOL/L (ref 21–32)
COLOR: YELLOW
CREAT SERPL-MCNC: 1.3 MG/DL (ref 0.8–1.3)
EKG ATRIAL RATE: 267 BPM
EKG ATRIAL RATE: 60 BPM
EKG DIAGNOSIS: NORMAL
EKG DIAGNOSIS: NORMAL
EKG Q-T INTERVAL: 538 MS
EKG Q-T INTERVAL: 548 MS
EKG QRS DURATION: 146 MS
EKG QRS DURATION: 146 MS
EKG QTC CALCULATION (BAZETT): 452 MS
EKG QTC CALCULATION (BAZETT): 470 MS
EKG R AXIS: 44 DEGREES
EKG R AXIS: 67 DEGREES
EKG T AXIS: 24 DEGREES
EKG T AXIS: 29 DEGREES
EKG VENTRICULAR RATE: 41 BPM
EKG VENTRICULAR RATE: 46 BPM
EOSINOPHILS ABSOLUTE: 0.2 K/UL (ref 0–0.6)
EOSINOPHILS RELATIVE PERCENT: 1.1 %
GFR SERPL CREATININE-BSD FRML MDRD: 59 ML/MIN/{1.73_M2}
GLUCOSE BLD-MCNC: 133 MG/DL (ref 70–99)
GLUCOSE URINE: NEGATIVE MG/DL
HCT VFR BLD CALC: 48 % (ref 40.5–52.5)
HEMOGLOBIN: 16.1 G/DL (ref 13.5–17.5)
INFLUENZA A: NOT DETECTED
INFLUENZA B: NOT DETECTED
KETONES, URINE: NEGATIVE MG/DL
LACTIC ACID, SEPSIS: 1.5 MMOL/L (ref 0.4–1.9)
LEUKOCYTE ESTERASE, URINE: NEGATIVE
LYMPHOCYTES ABSOLUTE: 0.8 K/UL (ref 1–5.1)
LYMPHOCYTES RELATIVE PERCENT: 5.9 %
MCH RBC QN AUTO: 29.6 PG (ref 26–34)
MCHC RBC AUTO-ENTMCNC: 33.5 G/DL (ref 31–36)
MCV RBC AUTO: 88.3 FL (ref 80–100)
MICROSCOPIC EXAMINATION: NORMAL
MONOCYTES ABSOLUTE: 0.5 K/UL (ref 0–1.3)
MONOCYTES RELATIVE PERCENT: 3.7 %
NEUTROPHILS ABSOLUTE: 11.8 K/UL (ref 1.7–7.7)
NEUTROPHILS RELATIVE PERCENT: 88.9 %
NITRITE, URINE: NEGATIVE
PDW BLD-RTO: 14.5 % (ref 12.4–15.4)
PH UA: 6 (ref 5–8)
PLATELET # BLD: 218 K/UL (ref 135–450)
PMV BLD AUTO: 9.8 FL (ref 5–10.5)
POTASSIUM REFLEX MAGNESIUM: 4.6 MMOL/L (ref 3.5–5.1)
PRO-BNP: 2532 PG/ML (ref 0–124)
PROTEIN UA: NEGATIVE MG/DL
RBC # BLD: 5.43 M/UL (ref 4.2–5.9)
SARS-COV-2 RNA, RT PCR: NOT DETECTED
SODIUM BLD-SCNC: 139 MMOL/L (ref 136–145)
SPECIFIC GRAVITY UA: 1.02 (ref 1–1.03)
TOTAL PROTEIN: 6.9 G/DL (ref 6.4–8.2)
TROPONIN: <0.01 NG/ML
URINE REFLEX TO CULTURE: NORMAL
URINE TYPE: NORMAL
UROBILINOGEN, URINE: 0.2 E.U./DL
WBC # BLD: 13.2 K/UL (ref 4–11)

## 2023-02-25 PROCEDURE — 94640 AIRWAY INHALATION TREATMENT: CPT

## 2023-02-25 PROCEDURE — 71045 X-RAY EXAM CHEST 1 VIEW: CPT

## 2023-02-25 PROCEDURE — 2060000000 HC ICU INTERMEDIATE R&B

## 2023-02-25 PROCEDURE — 81003 URINALYSIS AUTO W/O SCOPE: CPT

## 2023-02-25 PROCEDURE — 6370000000 HC RX 637 (ALT 250 FOR IP): Performed by: INTERNAL MEDICINE

## 2023-02-25 PROCEDURE — 36415 COLL VENOUS BLD VENIPUNCTURE: CPT

## 2023-02-25 PROCEDURE — 80053 COMPREHEN METABOLIC PANEL: CPT

## 2023-02-25 PROCEDURE — 93005 ELECTROCARDIOGRAM TRACING: CPT | Performed by: EMERGENCY MEDICINE

## 2023-02-25 PROCEDURE — 99285 EMERGENCY DEPT VISIT HI MDM: CPT

## 2023-02-25 PROCEDURE — 94761 N-INVAS EAR/PLS OXIMETRY MLT: CPT

## 2023-02-25 PROCEDURE — 2580000003 HC RX 258: Performed by: INTERNAL MEDICINE

## 2023-02-25 PROCEDURE — 83605 ASSAY OF LACTIC ACID: CPT

## 2023-02-25 PROCEDURE — 84484 ASSAY OF TROPONIN QUANT: CPT

## 2023-02-25 PROCEDURE — 85025 COMPLETE CBC W/AUTO DIFF WBC: CPT

## 2023-02-25 PROCEDURE — 93010 ELECTROCARDIOGRAM REPORT: CPT | Performed by: INTERNAL MEDICINE

## 2023-02-25 PROCEDURE — 83880 ASSAY OF NATRIURETIC PEPTIDE: CPT

## 2023-02-25 PROCEDURE — 87636 SARSCOV2 & INF A&B AMP PRB: CPT

## 2023-02-25 RX ORDER — ONDANSETRON 4 MG/1
4 TABLET, ORALLY DISINTEGRATING ORAL EVERY 8 HOURS PRN
Status: DISCONTINUED | OUTPATIENT
Start: 2023-02-25 | End: 2023-02-26 | Stop reason: HOSPADM

## 2023-02-25 RX ORDER — ATENOLOL 50 MG/1
100 TABLET ORAL DAILY
Status: DISCONTINUED | OUTPATIENT
Start: 2023-02-25 | End: 2023-02-26

## 2023-02-25 RX ORDER — ACETAMINOPHEN 325 MG/1
650 TABLET ORAL EVERY 6 HOURS PRN
Status: DISCONTINUED | OUTPATIENT
Start: 2023-02-25 | End: 2023-02-26 | Stop reason: HOSPADM

## 2023-02-25 RX ORDER — IPRATROPIUM BROMIDE AND ALBUTEROL SULFATE 2.5; .5 MG/3ML; MG/3ML
1 SOLUTION RESPIRATORY (INHALATION) 4 TIMES DAILY
Status: DISCONTINUED | OUTPATIENT
Start: 2023-02-25 | End: 2023-02-26 | Stop reason: HOSPADM

## 2023-02-25 RX ORDER — GABAPENTIN 300 MG/1
300 CAPSULE ORAL 3 TIMES DAILY
Status: DISCONTINUED | OUTPATIENT
Start: 2023-02-25 | End: 2023-02-26 | Stop reason: HOSPADM

## 2023-02-25 RX ORDER — SODIUM CHLORIDE 0.9 % (FLUSH) 0.9 %
5-40 SYRINGE (ML) INJECTION EVERY 12 HOURS SCHEDULED
Status: DISCONTINUED | OUTPATIENT
Start: 2023-02-25 | End: 2023-02-26 | Stop reason: HOSPADM

## 2023-02-25 RX ORDER — ONDANSETRON 2 MG/ML
4 INJECTION INTRAMUSCULAR; INTRAVENOUS EVERY 6 HOURS PRN
Status: DISCONTINUED | OUTPATIENT
Start: 2023-02-25 | End: 2023-02-26 | Stop reason: HOSPADM

## 2023-02-25 RX ORDER — ATORVASTATIN CALCIUM 10 MG/1
20 TABLET, FILM COATED ORAL DAILY
Status: DISCONTINUED | OUTPATIENT
Start: 2023-02-25 | End: 2023-02-26 | Stop reason: HOSPADM

## 2023-02-25 RX ORDER — ACETAMINOPHEN 650 MG/1
650 SUPPOSITORY RECTAL EVERY 6 HOURS PRN
Status: DISCONTINUED | OUTPATIENT
Start: 2023-02-25 | End: 2023-02-26 | Stop reason: HOSPADM

## 2023-02-25 RX ORDER — TAMSULOSIN HYDROCHLORIDE 0.4 MG/1
0.4 CAPSULE ORAL DAILY
Status: DISCONTINUED | OUTPATIENT
Start: 2023-02-25 | End: 2023-02-26 | Stop reason: HOSPADM

## 2023-02-25 RX ORDER — M-VIT,TX,IRON,MINS/CALC/FOLIC 27MG-0.4MG
1 TABLET ORAL DAILY
Status: DISCONTINUED | OUTPATIENT
Start: 2023-02-25 | End: 2023-02-26 | Stop reason: HOSPADM

## 2023-02-25 RX ORDER — ALBUTEROL SULFATE 2.5 MG/3ML
2.5 SOLUTION RESPIRATORY (INHALATION) EVERY 4 HOURS PRN
Status: DISCONTINUED | OUTPATIENT
Start: 2023-02-25 | End: 2023-02-26 | Stop reason: HOSPADM

## 2023-02-25 RX ORDER — LOSARTAN POTASSIUM 100 MG/1
100 TABLET ORAL DAILY
Status: DISCONTINUED | OUTPATIENT
Start: 2023-02-25 | End: 2023-02-26 | Stop reason: HOSPADM

## 2023-02-25 RX ORDER — BENZONATATE 100 MG/1
100 CAPSULE ORAL 3 TIMES DAILY PRN
Status: DISCONTINUED | OUTPATIENT
Start: 2023-02-25 | End: 2023-02-26 | Stop reason: HOSPADM

## 2023-02-25 RX ORDER — TORSEMIDE 20 MG/1
20 TABLET ORAL DAILY
Status: DISCONTINUED | OUTPATIENT
Start: 2023-02-26 | End: 2023-02-26

## 2023-02-25 RX ORDER — ALBUTEROL SULFATE 90 UG/1
2 AEROSOL, METERED RESPIRATORY (INHALATION) 4 TIMES DAILY PRN
Status: DISCONTINUED | OUTPATIENT
Start: 2023-02-25 | End: 2023-02-25

## 2023-02-25 RX ORDER — POLYETHYLENE GLYCOL 3350 17 G/17G
17 POWDER, FOR SOLUTION ORAL DAILY PRN
Status: DISCONTINUED | OUTPATIENT
Start: 2023-02-25 | End: 2023-02-26 | Stop reason: HOSPADM

## 2023-02-25 RX ORDER — SODIUM CHLORIDE 9 MG/ML
INJECTION, SOLUTION INTRAVENOUS PRN
Status: DISCONTINUED | OUTPATIENT
Start: 2023-02-25 | End: 2023-02-26 | Stop reason: HOSPADM

## 2023-02-25 RX ORDER — SODIUM CHLORIDE 0.9 % (FLUSH) 0.9 %
5-40 SYRINGE (ML) INJECTION PRN
Status: DISCONTINUED | OUTPATIENT
Start: 2023-02-25 | End: 2023-02-26 | Stop reason: HOSPADM

## 2023-02-25 RX ORDER — DULOXETIN HYDROCHLORIDE 60 MG/1
60 CAPSULE, DELAYED RELEASE ORAL DAILY
Status: DISCONTINUED | OUTPATIENT
Start: 2023-02-25 | End: 2023-02-26 | Stop reason: HOSPADM

## 2023-02-25 RX ADMIN — GABAPENTIN 300 MG: 300 CAPSULE ORAL at 20:18

## 2023-02-25 RX ADMIN — SODIUM CHLORIDE, PRESERVATIVE FREE 10 ML: 5 INJECTION INTRAVENOUS at 20:19

## 2023-02-25 RX ADMIN — IPRATROPIUM BROMIDE AND ALBUTEROL SULFATE 3 ML: 2.5; .5 SOLUTION RESPIRATORY (INHALATION) at 19:51

## 2023-02-25 RX ADMIN — APIXABAN 5 MG: 5 TABLET, FILM COATED ORAL at 20:18

## 2023-02-25 ASSESSMENT — LIFESTYLE VARIABLES: HOW OFTEN DO YOU HAVE A DRINK CONTAINING ALCOHOL: NEVER

## 2023-02-25 ASSESSMENT — PAIN SCALES - GENERAL: PAINLEVEL_OUTOF10: 6

## 2023-02-25 ASSESSMENT — PAIN - FUNCTIONAL ASSESSMENT: PAIN_FUNCTIONAL_ASSESSMENT: 0-10

## 2023-02-25 ASSESSMENT — PAIN DESCRIPTION - LOCATION: LOCATION: BACK

## 2023-02-25 NOTE — ED PROVIDER NOTES
[address] ED      CHIEF COMPLAINT  Dizziness (Pt just released from stay for PNU, new-onset AFIB, CHF. Here today bc pt became dizzy, fell, denies LOC or hitting head, experienced chest pains this morning )       HISTORY OF PRESENT ILLNESS  Kenyon Romero is a 77 y.o. male with a past medical history of CHF and atrial fibrillation who presents to the ED complaining of dizziness. The patient had a recent hospitalization here at [facility] for new onset atrial fibrillation and CHF. He states that he went home, has been compliant with his medications and started to feel dizzy today. He states that he felt lightheaded, and then had an episode of syncope where he lost consciousness for a few seconds. He fell, but his wife and daughter helped him to the ground and he did not hit his head or sustain any injuries. The patient did have 1 minute of chest pain earlier this morning but he states it quickly resolved. He otherwise has not had any chest pain today. He denies any new shortness of breath. He was nauseated earlier, but this has resolved he did not vomit. He denies any abdominal pain. He states that his leg swelling is improved and he actually has lost about 35 pounds recently. He was started on multiple new medications after being discharged including diltiazem, losartan and Eliquis. The patient also recently finished a course of Levaquin and was given steroids in the hospital for pneumonia. He denies cough today. No other complaints, modifying factors or associated symptoms. I have reviewed the following from the nursing documentation.     Past Medical History:   Diagnosis Date    Anxiety     Asthma     BPH     prostate 5/05,     Dermatitis due to sun     severe    Hearing loss     decreased hearing left ear    Hyperlipidemia     Hypertension     pos GXT 3/01, neg GXT 3/07    Narcolepsy     Osteoarthritis     Sleep apnea     bipap    Superficial peroneal nerve neuropathy bilateral     Past Surgical History:   Procedure Laterality Date    APPENDECTOMY      CHOLECYSTECTOMY      COLONOSCOPY  9    KNEE SURGERY Right 2016    Rt total knee    OTHER SURGICAL HISTORY Right 2016    RIGHT TOTAL SHOULDER REPLACEMENT          SHOULDER ARTHROPLASTY  2012    Lt total shoulder arthroplasty    TOTAL KNEE ARTHROPLASTY Left 2016     Family History   Problem Relation Age of Onset    Cancer Mother         breast    High Blood Pressure Mother     Arthritis Father     Depression Father     Hearing Loss Father     Heart Disease Father     Stroke Father     Vision Loss Father      Social History     Socioeconomic History    Marital status:      Spouse name: Not on file    Number of children: Not on file    Years of education: Not on file    Highest education level: Not on file   Occupational History    Not on file   Tobacco Use    Smoking status: Former     Packs/day: 3.00     Years: 32.00     Pack years: 96.00     Types: Cigarettes     Quit date: 1999     Years since quittin.1    Smokeless tobacco: Never   Substance and Sexual Activity    Alcohol use: No    Drug use: No    Sexual activity: Not on file   Other Topics Concern    Not on file   Social History Narrative    Not on file     Social Determinants of Health     Financial Resource Strain: Not on file   Food Insecurity: Not on file   Transportation Needs: Not on file   Physical Activity: Not on file   Stress: Not on file   Social Connections: Not on file   Intimate Partner Violence: Not on file   Housing Stability: Not on file     No current facility-administered medications for this encounter.      Current Outpatient Medications   Medication Sig Dispense Refill    dilTIAZem (CARDIZEM CD) 240 MG extended release capsule Take 1 capsule by mouth daily 30 capsule 3    benzonatate (TESSALON) 100 MG capsule Take 1 capsule by mouth 3 times daily as needed for Cough 21 capsule 0    guaiFENesin (MUCINEX) 600 MG extended release tablet Take 1 tablet by mouth 2 times daily (Patient not taking: Reported on 2/25/2023) 20 tablet 0    torsemide (DEMADEX) 20 MG tablet Take 1 tablet by mouth daily 30 tablet 3    apixaban (ELIQUIS) 5 MG TABS tablet Take 1 tablet by mouth 2 times daily 180 tablet 1    predniSONE (DELTASONE) 10 MG tablet Take 4 tablets once a day for 3 days, then take 3 tablets once a day for 3 days, then take 2 tablets once a day for 3 days, then take 1 tablet once a day for 3 days, then stop. (Patient not taking: Reported on 2/25/2023) 30 tablet 0    levoFLOXacin (LEVAQUIN) 750 MG tablet Take 1 tablet by mouth daily for 5 days (Patient not taking: Reported on 2/25/2023) 5 tablet 0    DULoxetine (CYMBALTA) 60 MG extended release capsule TAKE 1 CAPSULE EVERY DAY 90 capsule 1    gabapentin (NEURONTIN) 300 MG capsule TAKE 2 CAPSULES THREE TIMES DAILY 540 capsule 0    losartan (COZAAR) 100 MG tablet TAKE 1 TABLET EVERY DAY 90 tablet 1    albuterol sulfate HFA (VENTOLIN HFA) 108 (90 Base) MCG/ACT inhaler Inhale 2 puffs into the lungs 4 times daily as needed for Wheezing 18 g 5    ipratropium-albuterol (DUONEB) 0.5-2.5 (3) MG/3ML SOLN nebulizer solution Inhale 3 mLs into the lungs every 4 hours 360 mL 1    sulindac (CLINORIL) 200 MG tablet TAKE 1 TABLET EVERY DAY 90 tablet 1    tamsulosin (FLOMAX) 0.4 MG capsule TAKE 1 CAPSULE TWICE DAILY 180 capsule 1    atorvastatin (LIPITOR) 20 MG tablet TAKE 1 TABLET EVERY DAY 90 tablet 1    atenolol (TENORMIN) 100 MG tablet TAKE 1 TABLET EVERY DAY 90 tablet 1    sildenafil (VIAGRA) 50 MG tablet Take 50 mg by mouth as needed for Erectile Dysfunction      Handicap Placard MISC by Does not apply route 1 each 0    Multiple Vitamin (THERA) TABS Take 1 tablet by mouth daily. SAW PALMETTO Take  by mouth daily.        Allergies   Allergen Reactions    Celebrex [Celecoxib]      Nosebleeds    Elavil [Amitriptyline Hcl]      Nose bleed    Topamax      Leg cramps       REVIEW OF SYSTEMS  10 systems reviewed, pertinent positives per HPI otherwise noted to be negative. PHYSICAL EXAM  /80   Pulse 64   Temp 97.6 °F (36.4 °C) (Oral)   Resp 15   Ht 5' 11\" (1.803 m)   Wt (!) 305 lb (138.3 kg)   SpO2 92%   BMI 42.54 kg/m²    Physical exam:  General appearance: awake and cooperative. no distress. Ill appearing. Skin: pale. Warm and dry. No acute rashes. HENT: Normocephalic. Atraumatic. Mucus membranes are moist. No drooling or stridor. Neck: supple  Eyes: LIVE. EOM intact. Heart: bradycardic rate. Irregularly irregular rhythm. No murmurs. Lungs: Respirations unlabored. Inspiratory crackles posterior lower lobes. No wheezes, rales, or rhonchi. Fair air exchange  Abdomen: No tenderness. Bowel sounds normoactive. Soft. Non distended. No peritoneal signs. Musculoskeletal: No extremity edema. Compartments soft. No deformity. No tenderness in the extremities. All extremities neurovascularly intact. Radial, Dp, and PT pulses +2/4 bilaterally  Neurological: Alert and oriented. No focal deficits. No aphasia or dysarthria. Psychiatric: Normal mood and affect. LABS  I have reviewed all labs for this visit.    Results for orders placed or performed during the hospital encounter of 02/25/23   COVID-19 & Influenza Combo    Specimen: Nasopharyngeal Swab   Result Value Ref Range    SARS-CoV-2 RNA, RT PCR NOT DETECTED NOT DETECTED    INFLUENZA A NOT DETECTED NOT DETECTED    INFLUENZA B NOT DETECTED NOT DETECTED   CBC with Auto Differential   Result Value Ref Range    WBC 13.2 (H) 4.0 - 11.0 K/uL    RBC 5.43 4.20 - 5.90 M/uL    Hemoglobin 16.1 13.5 - 17.5 g/dL    Hematocrit 48.0 40.5 - 52.5 %    MCV 88.3 80.0 - 100.0 fL    MCH 29.6 26.0 - 34.0 pg    MCHC 33.5 31.0 - 36.0 g/dL    RDW 14.5 12.4 - 15.4 %    Platelets 627 043 - 924 K/uL    MPV 9.8 5.0 - 10.5 fL    Neutrophils % 88.9 %    Lymphocytes % 5.9 %    Monocytes % 3.7 %    Eosinophils % 1.1 %    Basophils % 0.4 %    Neutrophils Absolute 11.8 (H) 1.7 - 7.7 K/uL    Lymphocytes Absolute 0.8 (L) 1.0 - 5.1 K/uL    Monocytes Absolute 0.5 0.0 - 1.3 K/uL    Eosinophils Absolute 0.2 0.0 - 0.6 K/uL    Basophils Absolute 0.0 0.0 - 0.2 K/uL   CMP w/ Reflex to MG   Result Value Ref Range    Sodium 139 136 - 145 mmol/L    Potassium reflex Magnesium 4.6 3.5 - 5.1 mmol/L    Chloride 103 99 - 110 mmol/L    CO2 27 21 - 32 mmol/L    Anion Gap 9 3 - 16    Glucose 133 (H) 70 - 99 mg/dL    BUN 30 (H) 7 - 20 mg/dL    Creatinine 1.3 0.8 - 1.3 mg/dL    Est, Glom Filt Rate 59 (A) >60    Calcium 9.3 8.3 - 10.6 mg/dL    Total Protein 6.9 6.4 - 8.2 g/dL    Albumin 4.2 3.4 - 5.0 g/dL    Albumin/Globulin Ratio 1.6 1.1 - 2.2    Total Bilirubin 1.4 (H) 0.0 - 1.0 mg/dL    Alkaline Phosphatase 74 40 - 129 U/L    ALT 32 10 - 40 U/L    AST 16 15 - 37 U/L   Troponin   Result Value Ref Range    Troponin <0.01 <0.01 ng/mL   Lactate, Sepsis   Result Value Ref Range    Lactic Acid, Sepsis 1.5 0.4 - 1.9 mmol/L   Brain Natriuretic Peptide   Result Value Ref Range    Pro-BNP 2,532 (H) 0 - 124 pg/mL   Urinalysis with Reflex to Culture    Specimen: Urine   Result Value Ref Range    Urine Type NotGiven    EKG 12 Lead   Result Value Ref Range    Ventricular Rate 46 BPM    Atrial Rate 267 BPM    QRS Duration 146 ms    Q-T Interval 538 ms    QTc Calculation (Bazett) 470 ms    R Axis 44 degrees    T Axis 24 degrees    Diagnosis       Atrial fibrillation with slow ventricular responseRight bundle branch blockAbnormal ECGWhen compared with ECG of 18-FEB-2023 12:28,Vent.  rate has decreased BY  30 BPMNonspecific T wave abnormality, improved in Inferior leadsNonspecific T wave abnormality no longer evident in Anterolateral leadsQT has shortenedConfirmed by Jaquan Santana MD, MARIFER ([id number]) on 2/25/2023 3:21:46 PM     EKG 12 Lead   Result Value Ref Range    Ventricular Rate 41 BPM    Atrial Rate 60 BPM    QRS Duration 146 ms    Q-T Interval 548 ms    QTc Calculation (Bazett) 452 ms    R Axis 67 degrees T Axis 29 degrees    Diagnosis       Atrial fibrillationwith slow ventricular responseRight bundle branch blockAbnormal ECGWhen compared with ECG of 25-FEB-2023 12:47, (unconfirmed)No significant change was foundConfirmed by Martin Mckenzie MD, [address] ([id number]) on 2/25/2023 3:22:33 PM       ECG  The Ekg interpreted by me shows  atrial fibrillation with a rate of 46  Axis is   Normal  QTc is  470  Intervals and Durations show RBBB     ST Segments: normal  Rate decreased from prior EKG dated 2/18/23     The Ekg interpreted by me shows  atrial fibrillation with a rate of 41  Axis is   Normal  QTc is  452  Intervals and Durations show RBBB  ST Segments: nonspecific ST abnormality no acute ischemia         Radiology: The following plain film was independently interpreted by myself:  Chest: was negative for infiltrate, effusion, pneumothorax, or wide mediastinum; shows interstitial edema   All other non-plain film images (e.g., CT Scan, Ultrasound) have been interpreted by the on-call radiologist.      RADIOLOGY  XR CHEST PORTABLE    Result Date: 2/25/2023  EXAMINATION: ONE XRAY VIEW OF THE CHEST 2/25/2023 1:11 pm COMPARISON: 02/17/2023 HISTORY: ORDERING SYSTEM PROVIDED HISTORY: dizzy TECHNOLOGIST PROVIDED HISTORY: Reason for exam:->dizzy Reason for Exam: Pt just released from stay for PNU, new-onset AFIB, CHF. Here today bc pt became dizzy, fell, denies LOC or hitting head, experienced chest pains this morning FINDINGS: Cardiomegaly. Cardiomediastinal silhouette and bony thorax are unchanged. Stable appearance of the lungs with mild pulmonary vascular congestion interstitial pulmonary edema. No pneumothorax or pleural effusion. Bilateral shoulder arthroplasties. Stable examination with cardiomegaly and mild central pulmonary vascular congestion suggesting mild CHF. XR CHEST PORTABLE    Result Date: 2/17/2023  EXAMINATION: ONE XRAY VIEW OF THE CHEST 2/17/2023 10:28 am COMPARISON: None.  HISTORY: ORDERING SYSTEM PROVIDED HISTORY: SOB TECHNOLOGIST PROVIDED HISTORY: Reason for exam:->SOB Reason for Exam: SOB FINDINGS: There is no confluent airspace consolidation or effusion. There is cardiomegaly and vascular congestion. There is no interstitial prominence. Cardiomegaly and vascular congestion without evidence of interstitial edema. No confluent airspace consolidation. CT CHEST PULMONARY EMBOLISM W CONTRAST    Result Date: 2/17/2023  EXAMINATION: CTA OF THE CHEST 2/17/2023 12:41 pm TECHNIQUE: CTA of the chest was performed after the administration of intravenous contrast.  Multiplanar reformatted images are provided for review. MIP images are provided for review. Automated exposure control, iterative reconstruction, and/or weight based adjustment of the mA/kV was utilized to reduce the radiation dose to as low as reasonably achievable. COMPARISON: None. HISTORY: ORDERING SYSTEM PROVIDED HISTORY: concern PNA TECHNOLOGIST PROVIDED HISTORY: Reason for exam:->concern PNA Decision Support Exception - unselect if not a suspected or confirmed emergency medical condition->Emergency Medical Condition (MA) Reason for Exam: Shortness of Breath (Pt reports 4 day history of cough/congestion with SOB starting last night. Went to PCP today, tested negative for COVID so she referred him to ER for further evaluation. ) FINDINGS: Pulmonary Arteries: Pulmonary arteries are adequately opacified for evaluation. No evidence of intraluminal filling defect to suggest pulmonary embolism. Main pulmonary artery is normal in caliber. Mediastinum: No evidence of mediastinal lymphadenopathy. The heart and pericardium demonstrate no acute abnormality. There is no acute abnormality of the thoracic aorta. Lungs/pleura: Numerous small ground-glass nodular opacities are noted throughout the bilateral lower lobes likely reflecting an infectious/inflammatory process. No evidence of pleural effusion or pneumothorax.  Upper Abdomen: Limited images of the upper abdomen are unremarkable. Soft Tissues/Bones: No acute bone or soft tissue abnormality. The bilateral thyroid lobes are enlarged. No evidence of pulmonary embolism. Numerous small ground-glass nodular opacities are noted throughout the bilateral lower lobes likely reflecting an infectious/inflammatory process. Recommend follow-up chest CT in 3 months to ensure resolution. Enlarged thyroid. Recommend further evaluation with non emergent thyroid ultrasound. No results found. Is this patient to be included in the SEP-1 Core Measure due to severe sepsis or septic shock? No   Exclusion criteria - the patient is NOT to be included for SEP-1 Core Measure due to: Infection is not suspected        ED COURSE/MDM  Patient seen and evaluated. Old records reviewed. Labs and imaging reviewed and results discussed with patient. The patient is a 70-year-old male with a past medical history of atrial fibrillation and CHF who presents to the ED for evaluation of syncope. The patient had a syncopal episode this morning and has felt lightheaded since that time. On arrival here, blood pressure is soft at 91/61. Patient is bradycardic with heart rate of 43 on arrival.  He is afebrile. He is ill-appearing, but responsive. The patient denies any active chest pain on arrival.  His EKG shows slow atrial fibrillation, no evidence of ischemia on EKG. His troponin is negative. There is no evidence of ACS. The patient appears to be doing well from a fluid status, chest x-ray does have interstitial edema however clinically the patient does not appear fluid overloaded. He is not hypoxic. Otherwise his blood work shows a leukocytosis of 13.2, he was recently on steroids and I do suspect this is reactive. No evidence of infection today. Lactate is normal at 1.5. There is no significant electrolyte abnormality or KAVYA, it appears that he has chronic renal insufficiency.  His blood pressure did slowly improve without intervention, and his heart rate stayed in the 40s at the beginning of his stay but then did improve to the 50s. I went over his medications with his wife. She states that the new medications include diltiazem, she states that he has been on atenolol for years. It is unclear if the diltiazem is causing his bradycardia and hypotension. I spoke with cardiology Dr. Paolo Harris, he recommended holding the diltiazem and seeing how the patient responds. There is the chance the patient would require pacemaker for tachybrady syndrome. I did initially admit the patient to [facility] and spoke with Dr. Kayla Gallagher who accepted. However [facility] is discharge dependent, and the patient actually wishes to stay here at [facility] as opposed to being transferred. Since his heart rate and blood pressure are improving I think is reasonable to keep him here at [facility] and assess his response to diltiazem. Dr. Paolo Harris was on board with this plan. I also spoke with Dr. Carito Verdin from hospitalist team who accepts. During the patient's ED course, the patient was given:  Medications - No data to display     CLINICAL IMPRESSION  1. Symptomatic bradycardia    2. Chronic kidney disease, unspecified CKD stage    3. Chronic atrial fibrillation ([address]    4. Syncope and collapse        Blood pressure 124/80, pulse 64, temperature 97.6 °F (36.4 °C), temperature source Oral, resp. rate 15, height 5' 11\" (1.803 m), weight (!) 305 lb (138.3 kg), SpO2 92 %. Patient was given scripts for the following medications. I counseled patient how to take these medications. New Prescriptions    No medications on file       Follow-up with:  No follow-up provider specified. DISCLAIMER: This chart was created using Dragon dictation software. Efforts were made by me to ensure accuracy, however some errors may be present due to limitations of this technology and occasionally words are not transcribed correctly.        Talita Bermeo, [place]  02/25/23 [id number]

## 2023-02-25 NOTE — PROGRESS NOTES
Consult has been called to Dr. Kia Lyman on 2/25/23. Spoke with [employer].  6:26 PM    Jessica Osborne  2/25/2023

## 2023-02-25 NOTE — ED NOTES
[id number] call placed to [facility] to begin new pt transfer to [facility]  02/25/23 1274

## 2023-02-25 NOTE — ED NOTES
Report given to [facility] [place].       Angelique English RN  02/25/23 2998

## 2023-02-25 NOTE — FLOWSHEET NOTE
02/25/23 1809   Vital Signs   Temp 98.4 °F (36.9 °C)   Temp Source Oral   Heart Rate 67   Resp 14   /78   MAP (Calculated) 92   BP Location Left lower arm   Level of Consciousness 0   MEWS Score 0   Oxygen Therapy   SpO2 93 %   O2 Device None (Room air)   Height and Weight   Height 5' 11\" (1.803 m)     Patient admitted to room 306 from ER. Patient oriented to room, call light, bed rails, phone, lights and bathroom. Patient instructed about the schedule of the day including: vital sign frequency, lab draws, possible tests, frequency of MD and staff rounds, daily weights, I &O's and prescribed diet. Telemetry box in place, patient aware of placement and reason. Bed locked, in lowest position, side rails up 2/4, call light within reach. Recliner Assessment  Patient is able to demonstrate the ability to move from a reclining position to an upright position within the recliner.

## 2023-02-25 NOTE — ED NOTES
[id number] call placed to [facility] cardiology      Doran Cabot  02/25/23 [phone] Consult completed with call back from Dr. Antionette Tay speaking with DR. Yaya Khanna  02/25/23 0619

## 2023-02-25 NOTE — PLAN OF CARE
Syncope. ..    53-year-old male, recent diagnosis of CHF and A-fib. He was discharged on the 20th, on atenolol 100 mg daily, Cardizem  mg daily, Eliquis twice daily, Cozaar and torsemide. .    Patient presenting with complaints of lightheadedness and syncope with witnessed LOC at home. Bradycardic with heart rate dropping to the 30s in the ED, hypotensive blood pressure 91/61. Patient observed without any intervention, rate and blood pressure stabilized. Monitor shows slow A-fib with heart rates of 50-60 now. .  Blood pressure stable now and patient asymptomatic. Troponin negative. Cardiology was consulted from the ED. ED physician spoke to Dr. Ernesto Calvin. Initial thoughts for possible transfer to [facility], however [facility] does not have any beds available. Cardiology okay with admitting here to [facility]. Admit to PCU. Hold Cardizem and atenolol and Cozaar. .  Monitor blood pressure and heart rate. Continue Eliquis  I have reordered torsemide for tomorrow morning. .    Cardiology consult.

## 2023-02-25 NOTE — ED NOTES
Patients wife assisted with urinal. Specimen collected and sent. Updated plan of care.       Clay Santana RN  02/25/23 [id number]

## 2023-02-26 VITALS
WEIGHT: 306.3 LBS | HEIGHT: 71 IN | HEART RATE: 74 BPM | BODY MASS INDEX: 42.88 KG/M2 | DIASTOLIC BLOOD PRESSURE: 73 MMHG | OXYGEN SATURATION: 96 % | TEMPERATURE: 97.6 F | SYSTOLIC BLOOD PRESSURE: 120 MMHG | RESPIRATION RATE: 20 BRPM

## 2023-02-26 PROBLEM — R00.1 SYMPTOMATIC BRADYCARDIA: Status: ACTIVE | Noted: 2023-02-26

## 2023-02-26 PROBLEM — N18.9 CHRONIC KIDNEY DISEASE: Status: ACTIVE | Noted: 2023-02-26

## 2023-02-26 LAB
ANION GAP SERPL CALCULATED.3IONS-SCNC: 9 MMOL/L (ref 3–16)
BASOPHILS ABSOLUTE: 0.1 K/UL (ref 0–0.2)
BASOPHILS RELATIVE PERCENT: 0.5 %
BUN BLDV-MCNC: 30 MG/DL (ref 7–20)
CALCIUM SERPL-MCNC: 9 MG/DL (ref 8.3–10.6)
CHLORIDE BLD-SCNC: 100 MMOL/L (ref 99–110)
CO2: 26 MMOL/L (ref 21–32)
CREAT SERPL-MCNC: 1.1 MG/DL (ref 0.8–1.3)
EOSINOPHILS ABSOLUTE: 0.2 K/UL (ref 0–0.6)
EOSINOPHILS RELATIVE PERCENT: 1.8 %
GFR SERPL CREATININE-BSD FRML MDRD: >60 ML/MIN/{1.73_M2}
GLUCOSE BLD-MCNC: 186 MG/DL (ref 70–99)
HCT VFR BLD CALC: 46.8 % (ref 40.5–52.5)
HEMOGLOBIN: 14.9 G/DL (ref 13.5–17.5)
LYMPHOCYTES ABSOLUTE: 1.5 K/UL (ref 1–5.1)
LYMPHOCYTES RELATIVE PERCENT: 13.2 %
MCH RBC QN AUTO: 28.9 PG (ref 26–34)
MCHC RBC AUTO-ENTMCNC: 31.9 G/DL (ref 31–36)
MCV RBC AUTO: 90.8 FL (ref 80–100)
MONOCYTES ABSOLUTE: 0.8 K/UL (ref 0–1.3)
MONOCYTES RELATIVE PERCENT: 6.9 %
NEUTROPHILS ABSOLUTE: 9 K/UL (ref 1.7–7.7)
NEUTROPHILS RELATIVE PERCENT: 77.6 %
PDW BLD-RTO: 14.4 % (ref 12.4–15.4)
PLATELET # BLD: 169 K/UL (ref 135–450)
PMV BLD AUTO: 9.2 FL (ref 5–10.5)
POTASSIUM REFLEX MAGNESIUM: 3.7 MMOL/L (ref 3.5–5.1)
RBC # BLD: 5.16 M/UL (ref 4.2–5.9)
SODIUM BLD-SCNC: 135 MMOL/L (ref 136–145)
WBC # BLD: 11.6 K/UL (ref 4–11)

## 2023-02-26 PROCEDURE — 80048 BASIC METABOLIC PNL TOTAL CA: CPT

## 2023-02-26 PROCEDURE — 94761 N-INVAS EAR/PLS OXIMETRY MLT: CPT

## 2023-02-26 PROCEDURE — 85025 COMPLETE CBC W/AUTO DIFF WBC: CPT

## 2023-02-26 PROCEDURE — 99234 HOSP IP/OBS SM DT SF/LOW 45: CPT | Performed by: INTERNAL MEDICINE

## 2023-02-26 PROCEDURE — 94640 AIRWAY INHALATION TREATMENT: CPT

## 2023-02-26 PROCEDURE — 36415 COLL VENOUS BLD VENIPUNCTURE: CPT

## 2023-02-26 PROCEDURE — 2580000003 HC RX 258: Performed by: INTERNAL MEDICINE

## 2023-02-26 PROCEDURE — 6370000000 HC RX 637 (ALT 250 FOR IP): Performed by: INTERNAL MEDICINE

## 2023-02-26 PROCEDURE — 99223 1ST HOSP IP/OBS HIGH 75: CPT | Performed by: INTERNAL MEDICINE

## 2023-02-26 RX ORDER — ATENOLOL 25 MG/1
25 TABLET ORAL DAILY
Status: DISCONTINUED | OUTPATIENT
Start: 2023-02-26 | End: 2023-02-26

## 2023-02-26 RX ORDER — LOSARTAN POTASSIUM 100 MG/1
50 TABLET ORAL DAILY
Qty: 15 TABLET | Refills: 0 | Status: SHIPPED | OUTPATIENT
Start: 2023-02-26 | End: 2023-02-28 | Stop reason: SINTOL

## 2023-02-26 RX ORDER — TORSEMIDE 20 MG/1
10 TABLET ORAL DAILY
Status: DISCONTINUED | OUTPATIENT
Start: 2023-02-27 | End: 2023-02-26 | Stop reason: HOSPADM

## 2023-02-26 RX ORDER — DILTIAZEM HYDROCHLORIDE 240 MG/1
240 CAPSULE, COATED, EXTENDED RELEASE ORAL DAILY
Status: DISCONTINUED | OUTPATIENT
Start: 2023-02-26 | End: 2023-02-26 | Stop reason: HOSPADM

## 2023-02-26 RX ORDER — TORSEMIDE 10 MG/1
10 TABLET ORAL DAILY
Qty: 30 TABLET | Refills: 3 | Status: SHIPPED | OUTPATIENT
Start: 2023-02-27 | End: 2023-02-28 | Stop reason: ALTCHOICE

## 2023-02-26 RX ADMIN — Medication 1 TABLET: at 10:47

## 2023-02-26 RX ADMIN — SODIUM CHLORIDE, PRESERVATIVE FREE 5 ML: 5 INJECTION INTRAVENOUS at 10:48

## 2023-02-26 RX ADMIN — IPRATROPIUM BROMIDE AND ALBUTEROL SULFATE 3 ML: 2.5; .5 SOLUTION RESPIRATORY (INHALATION) at 07:09

## 2023-02-26 RX ADMIN — DULOXETINE HYDROCHLORIDE 60 MG: 60 CAPSULE, DELAYED RELEASE ORAL at 10:46

## 2023-02-26 RX ADMIN — BENZONATATE 100 MG: 100 CAPSULE ORAL at 00:58

## 2023-02-26 RX ADMIN — GABAPENTIN 300 MG: 300 CAPSULE ORAL at 10:46

## 2023-02-26 RX ADMIN — IPRATROPIUM BROMIDE AND ALBUTEROL SULFATE 3 ML: 2.5; .5 SOLUTION RESPIRATORY (INHALATION) at 11:01

## 2023-02-26 RX ADMIN — DILTIAZEM HYDROCHLORIDE 240 MG: 240 CAPSULE, COATED, EXTENDED RELEASE ORAL at 10:47

## 2023-02-26 RX ADMIN — TAMSULOSIN HYDROCHLORIDE 0.4 MG: 0.4 CAPSULE ORAL at 10:46

## 2023-02-26 RX ADMIN — ATORVASTATIN CALCIUM 20 MG: 10 TABLET, FILM COATED ORAL at 10:46

## 2023-02-26 RX ADMIN — APIXABAN 5 MG: 5 TABLET, FILM COATED ORAL at 10:46

## 2023-02-26 NOTE — DISCHARGE SUMMARY
Please see combined H & P and discharge summary          Medication List        CHANGE how you take these medications      losartan 100 MG tablet  Commonly known as: COZAAR  Take 0.5 tablets by mouth daily  What changed: See the new instructions.      torsemide 10 MG tablet  Commonly known as: DEMADEX  Take 1 tablet by mouth daily  Start taking on: February 27, 2023  What changed:   medication strength  how much to take            CONTINUE taking these medications      albuterol sulfate  (90 Base) MCG/ACT inhaler  Commonly known as: Ventolin HFA  Inhale 2 puffs into the lungs 4 times daily as needed for Wheezing     apixaban 5 MG Tabs tablet  Commonly known as: Eliquis  Take 1 tablet by mouth 2 times daily     atorvastatin 20 MG tablet  Commonly known as: LIPITOR  TAKE 1 TABLET EVERY DAY     benzonatate 100 MG capsule  Commonly known as: TESSALON  Take 1 capsule by mouth 3 times daily as needed for Cough     dilTIAZem 240 MG extended release capsule  Commonly known as: CARDIZEM CD  Take 1 capsule by mouth daily     DULoxetine 60 MG extended release capsule  Commonly known as: CYMBALTA  TAKE 1 CAPSULE EVERY DAY     gabapentin 300 MG capsule  Commonly known as: NEURONTIN  TAKE 2 CAPSULES THREE TIMES DAILY     Handicap Placard Misc  by Does not apply route     ipratropium-albuterol 0.5-2.5 (3) MG/3ML Soln nebulizer solution  Commonly known as: DUONEB  Inhale 3 mLs into the lungs every 4 hours     SAW PALMETTO     sildenafil 50 MG tablet  Commonly known as: VIAGRA     sulindac 200 MG tablet  Commonly known as: CLINORIL  TAKE 1 TABLET EVERY DAY     tamsulosin 0.4 MG capsule  Commonly known as: FLOMAX  TAKE 1 CAPSULE TWICE DAILY     thera/beta-carotene Tabs            STOP taking these medications      atenolol 100 MG tablet  Commonly known as: TENORMIN     guaiFENesin 600 MG extended release tablet  Commonly known as: MUCINEX     levoFLOXacin 750 MG tablet  Commonly known as: Levaquin     predniSONE 10 MG tablet  Commonly known as: Miguel Avery               Where to Get Your Medications        These medications were sent to [facility] [address], [address] [place] [id number]      Phone: [phone]   losartan 100 MG tablet  torsemide 10 MG tablet           Jose Silva [place]  02/26/23  12:47 PM

## 2023-02-26 NOTE — PROGRESS NOTES
Hand off report given to Enmanuel Ruff RN. Patient is stable showing no signs of distress and has no current needs at this time. Call light is in reach and bed is in lowest position. Care is transferred at this time.

## 2023-02-26 NOTE — CONSULTS
[facility]  [phone]        Reason for Consultation/Chief Complaint: \"I have been asked to see the patient for bradycardia and syncope\"    History of Present Illness:  Lb Wheatley is a 77 y.o. patient who presented to the hospital with complaints of  near passing out on 2.25.23  Patient accompanied by his wife in room. History provided by patient and wife. On day of admission walking to see grand daughters Anam & Shira dyson before reaching [address] [place] [address] Rd he felt very dizzy and had to sit down. He felt he was weak and going to faint. He did no lose consciousness. Symptoms lasted about 15-20 min at least. Patient came to ED. He was noted to have slow heart rate at 41 per min. About 10 days ago he was admitted here with pneumonia and diagnosed with afib and CHF. Since then he has lost 34 lbs. No chest pain shortness of breath or palpitations. I have been asked to provide consultation regarding further management and testing. Past Medical History:   has a past medical history of Anxiety, Asthma, Atrial fibrillation ([address] BPH, CHF (congestive heart failure) ([address] Dermatitis due to sun, Hearing loss, Hyperlipidemia, Hypertension, Narcolepsy, Osteoarthritis, Pneumonia, Sleep apnea, and Superficial peroneal nerve neuropathy. Surgical History:   has a past surgical history that includes Cholecystectomy; Appendectomy; Colonoscopy (12/19/11); Total shoulder arthroplasty (6/19/2012); knee surgery (Right, 04/22/2016); Total knee arthroplasty (Left, 07/19/2016); and other surgical history (Right, 09/01/2016). Social History:   reports that he quit smoking about 24 years ago. His smoking use included cigarettes. He has a 96.00 pack-year smoking history. He has never used smokeless tobacco. He reports that he does not drink alcohol and does not use drugs.      Family History:  family history includes Arthritis in his father; Cancer in his mother; Depression in his father; Hearing Loss in his father; Heart Disease in his father; High Blood Pressure in his mother; Stroke in his father; Vision Loss in his father. Home Medications:  Were reviewed and are listed in nursing record. and/or listed below  Prior to Admission medications    Medication Sig Start Date End Date Taking?  Authorizing Provider   dilTIAZem (CARDIZEM CD) 240 MG extended release capsule Take 1 capsule by mouth daily 2/21/23   Dominick Aguila MD   benzonatate (TESSALON) 100 MG capsule Take 1 capsule by mouth 3 times daily as needed for Cough 2/20/23 2/27/23  Dominick Aguila MD   torsemide (DEMADEX) 20 MG tablet Take 1 tablet by mouth daily 2/21/23   Dominick Aguila MD   apixaban (ELIQUIS) 5 MG TABS tablet Take 1 tablet by mouth 2 times daily 2/20/23   Dominick Aguila MD   DULoxetine (CYMBALTA) 60 MG extended release capsule TAKE 1 CAPSULE EVERY DAY 11/28/22   JASEN Gillis CNP   gabapentin (NEURONTIN) 300 MG capsule TAKE 2 CAPSULES THREE TIMES DAILY 11/28/22 2/28/23  JASEN Gillis CNP   losartan (COZAAR) 100 MG tablet TAKE 1 TABLET EVERY DAY 11/28/22   JASEN Gillis CNP   albuterol sulfate HFA (VENTOLIN HFA) 108 (90 Base) MCG/ACT inhaler Inhale 2 puffs into the lungs 4 times daily as needed for Wheezing 9/27/22   JASEN Gillis CNP   ipratropium-albuterol (DUONEB) 0.5-2.5 (3) MG/3ML SOLN nebulizer solution Inhale 3 mLs into the lungs every 4 hours 9/27/22   JASEN Gillis CNP   sulindac (CLINORIL) 200 MG tablet TAKE 1 TABLET EVERY DAY 9/8/22   JASEN Gillis CNP   tamsulosin (FLOMAX) 0.4 MG capsule TAKE 1 CAPSULE TWICE DAILY 8/17/22   JASEN Gillis CNP   atorvastatin (LIPITOR) 20 MG tablet TAKE 1 TABLET EVERY DAY 8/5/22   JASEN Gillis CNP   atenolol (TENORMIN) 100 MG tablet TAKE 1 TABLET EVERY DAY 8/5/22   Alyssa Cagey, APRN - CNP   sildenafil (VIAGRA) 50 MG tablet Take 50 mg by mouth as needed for Erectile Dysfunction    Historical Provider, MD   Handicap Placard MISC by Does not apply route 12/2/21   Kvng Nakita, APRN - CNP   Multiple Vitamin (THERA) TABS Take 1 tablet by mouth daily. Historical Provider, MD PONCE ABHILASH Take  by mouth daily.     Historical Provider, MD        Allergies:  Celebrex [celecoxib], Elavil [amitriptyline hcl], and Topamax     Review of Systems:   12 point ROS negative in all areas as listed below except as in [place]  Constitutional, EENT,  GI, , Musculoskeletal, skin, neurological, hematological, endocrine, Psychiatric    Physical Examination:    Vitals:    02/26/23 0711   BP:    Pulse:    Resp:    Temp:    SpO2: 96%    Weight: (!) 306 lb 4.8 oz (138.9 kg)       Obese   General Appearance:  Alert, cooperative, no distress, appears stated age   Head:  Normocephalic, without obvious abnormality, atraumatic   Eyes:  PERRL, conjunctiva/corneas clear       Nose: Nares normal, no drainage or sinus tenderness   Throat: Lips, mucosa, and tongue normal   Neck: Supple, symmetrical, trachea midline, no adenopathy, thyroid: not enlarged, symmetric, no tenderness/mass/nodules, no carotid bruit or JVD       Lungs:   Exp rhonchi  to auscultation bilaterally, respirations unlabored   Chest Wall:  No tenderness or deformity   Heart:  Irreg and irregular rate and rhythm, S2 normal, no murmur, rub or gallop   Abdomen:   Soft, non-tender, bowel sounds active all four quadrants,  no masses, no organomegaly           Extremities: Extremities normal, atraumatic, no cyanosis or edema   Pulses: 2+ and symmetric   Skin: Skin color, texture, turgor normal, no rashes or lesions   Pysch: Normal mood and affect   Neurologic: Normal gross motor and sensory exam.         Labs  CBC:   Lab Results   Component Value Date/Time    WBC 13.2 02/25/2023 12:57 PM    RBC 5.43 02/25/2023 12:57 PM    HGB 16.1 02/25/2023 12:57 PM    HCT 48.0 02/25/2023 12:57 PM    MCV 88.3 02/25/2023 12:57 PM    RDW 14.5 02/25/2023 12:57 PM     02/25/2023 12:57 PM     CMP:    Lab Results Component Value Date/Time     02/25/2023 12:57 PM    K 4.6 02/25/2023 12:57 PM     02/25/2023 12:57 PM    CO2 27 02/25/2023 12:57 PM    BUN 30 02/25/2023 12:57 PM    CREATININE 1.3 02/25/2023 12:57 PM    GFRAA >60 12/07/2021 08:01 AM    GFRAA >60 06/01/2013 10:21 AM    AGRATIO 1.6 02/25/2023 12:57 PM    LABGLOM 59 02/25/2023 12:57 PM    GLUCOSE 133 02/25/2023 12:57 PM    PROT 6.9 02/25/2023 12:57 PM    CALCIUM 9.3 02/25/2023 12:57 PM    BILITOT 1.4 02/25/2023 12:57 PM    ALKPHOS 74 02/25/2023 12:57 PM    AST 16 02/25/2023 12:57 PM    ALT 32 02/25/2023 12:57 PM     PT/INR:  No results found for: PTINR  Lab Results   Component Value Date    TROPONINI <0.01 02/25/2023   Trop <0.01    Echo 2.18.23    Summary   Technically limited study given tachycardia and afib. Left ventricular systolic function is normal with ejection fraction   estimated at 55-60 %. No regional wall motion abnormalities are noted. There is mild concentric left ventricular hypertrophy with sigmoid shaped   septum. Normal left ventricular diastolic filling pressure. Bi-atrial enlargement. The right ventricle is mildly enlarged. The aortic root is mildly dilated. The ascending aorta is moderately dilated. Mild mitral regurgitation. Mild aortic regurgitation is present. Mild tricuspid regurgitation. Systolic pulmonary artery pressure (SPAP) estimated at 45 mmHg (RA pressure   15 mmHg), consistent with mild pulmonary hypertension. Mild pulmonic regurgitation present.       Consider repeat study when rate controlled if clinically indicate.d      Signature      ------------------------------------------------------------------   Electronically signed by Iris Ohara DO (Interpreting   physician) on 02/18/2023 at 11:43 AM   ----------------------------------------------------------------  EKG   Atrial fibrillationwith slow ventricular responseRight bundle branch blockAbnormal ECGWhen compared with ECG of 25-FEB-2023 12:47, (unconfirmed)No significant change was foundConfirmed by Kamlesh Guillermo MD, [address] (1988) on 2/25/2023 3:22:33 PM Atrial fibrillation with slow ventricular responseRight bundle branch blockAbnormal ECGWhen compared with ECG of 18-FEB-2023 12:28,Vent. rate has decreased BY  30 BPMNonspecific T wave abnormality, improved in Inferior leadsNonspecific T wave abnormality no longer evident in Anterolateral leadsQT has shortenedConfirmed by Kamlesh Guillermo MD [facility] ([id number]) on 2/25/2023 3:21:46 PM   CXRAY 2.25.23     FINDINGS:   Cardiomegaly. Cardiomediastinal silhouette and bony thorax are unchanged. Stable appearance of the lungs with mild pulmonary vascular congestion   interstitial pulmonary edema. No pneumothorax or pleural effusion. Bilateral shoulder arthroplasties. Impression   Stable examination with cardiomegaly and mild central pulmonary vascular   congestion suggesting mild CHF. Assessment  Afib slow ventricular response  Near syncope and collapse  Chronic diastolic CHF  Hypertension  Hyperlipidemia  Obesity   NICOLE  Plan:    I had the opportunity to review the clinical symptoms and presentation of Teddy Lawson. Overnight his heart rates have stablized and this am he remains in afib but rate 70- 80 no symptoms. I recommend that the patient undergo  medication reduction   His symptoms are related to hypoperfusion of brain due to bradycardia and probably hypovolemia due t0 34 lbs fluid loss in last two weeks  Suggest stopping Atenolol since he has asthma and is wheezing just stay on diltiazem  Reduce dose of demadex to half  Continue anticoagulant  No asa due to thrombocytopenia and not needed  Continue statin  Out patient follow up March 8 Dr Janell Salvador patient follow up Pulmonologist in March for NICOLE   Discussed with Dr Coletta Simmonds  OK to discharge  I will address the patient's cardiac risk factors and adjusted pharmacologic treatment as needed.  In addition, I have reinforced the need for patient directed risk factor modification. Tobacco use was discussed with the patient and educated on the negative effects. I have asked the patient to not utilize these agents. Thank you for allowing to us to participate in the care or [facility]. Further evaluation will be based upon the patient's clinical course and testing results. All questions and concerns were addressed to the patient/family. Alternatives to my treatment were discussed. The note was completed using EMR. Every effort was made to ensure accuracy; however, inadvertent computerized transcription errors may be present.

## 2023-02-26 NOTE — PROGRESS NOTES
Patient admitted to room 306 from ED. Patient oriented to room, call light, bed rails, phone, lights and bathroom. Patient instructed about the schedule of the day including: vital sign frequency, lab draws, possible tests, frequency of MD and staff rounds, daily weights, I &O's and prescribed diet. Telemetry box in place, patient aware of placement and reason. Bed locked, in lowest position, side rails up 2/4, call light within reach. Recliner Assessment  Patient is able to demonstrate the ability to move from a reclining position to an upright position within the recliner. 4 Eyes Skin Assessment     The patient is being assess for   Admission    I agree that 2 RN's have performed a thorough Head to Toe Skin Assessment on the patient. ALL assessment sites listed below have been assessed. Areas assessed for pressure by both nurses:   [x]   Head, Face, and Ears   [x]   Shoulders, Back, and Chest, Abdomen  [x]   Arms, Elbows, and Hands   [x]   Coccyx, Sacrum, and Ischium  [x]   Legs, Feet, and Heels  Lesion to left plantar, right post tibial, bruises to lower abdomen and bilateral forearms. Skin Assessed Under all Medical Devices by both nurses:  NA               All Mepilex Borders were peeled back and area peeked at by both nurses:  Yes  Please list where Mepilex Borders are located:  NA             **SHARE this note so that the co-signing nurse is able to place an eSignature**    Co-signer eSignature: Electronically signed by Mahi Mayorga RN on 2/25/23 at 9:08 PM EST    Does the Patient have Skin Breakdown related to pressure?   No            Manolo Prevention initiated:  No   Wound Care Orders initiated:  No      [facility] nurse consulted for Pressure Injury (Stage 3,4, Unstageable, DTI, NWPT, Complex wounds)and New or Established Ostomies:  No      Primary Nurse eSignature: Electronically signed by Chanda Hernández RN on 2/25/23 at 9:04 PM EST

## 2023-02-26 NOTE — FLOWSHEET NOTE
02/25/23 1916   Vital Signs   Temp 97.1 °F (36.2 °C)   Temp Source Oral   Heart Rate 80   Resp 14   /71   MAP (Calculated) 84   BP Location Right upper arm   BP Method Automatic   Patient Position High fowlers   Oxygen Therapy   SpO2 93 %   O2 Device None (Room air)   Pt arrived to pcu from ED. Call light and bedside table within reach. Shift assessment will be added.

## 2023-02-26 NOTE — PROGRESS NOTES
RT Inhaler-Nebulizer Bronchodilator Protocol Note    There is a bronchodilator order in the chart from a provider indicating to follow the RT Bronchodilator Protocol and there is an Initiate RT Inhaler-Nebulizer Bronchodilator Protocol order as well (see protocol at bottom of note). CXR Findings:  XR CHEST PORTABLE    Result Date: 2/25/2023  Stable examination with cardiomegaly and mild central pulmonary vascular congestion suggesting mild CHF. The findings from the last RT Protocol Assessment were as follows:   History Pulmonary Disease: (P) Chronic pulmonary disease  Respiratory Pattern: (P) Regular pattern and RR 12-20 bpm  Breath Sounds: (P) Slightly diminished and/or crackles  Cough: (P) Strong, spontaneous, non-productive  Indication for Bronchodilator Therapy: (P) On home bronchodilators  Bronchodilator Assessment Score: (P) 4    Aerosolized bronchodilator medication orders have been revised according to the RT Inhaler-Nebulizer Bronchodilator Protocol below. Respiratory Therapist to perform RT Therapy Protocol Assessment initially then follow the protocol. Repeat RT Therapy Protocol Assessment PRN for score 0-3 or on second treatment, BID, and PRN for scores above 3. No Indications - adjust the frequency to every 6 hours PRN wheezing or bronchospasm, if no treatments needed after 48 hours then discontinue using Per Protocol order mode. If indication present, adjust the RT bronchodilator orders based on the Bronchodilator Assessment Score as indicated below. Use Inhaler orders unless patient has one or more of the following: on home nebulizer, not able to hold breath for 10 seconds, is not alert and oriented, cannot activate and use MDI correctly, or respiratory rate 25 breaths per minute or more, then use the equivalent nebulizer order(s) with same Frequency and PRN reasons based on the score.   If a patient is on this medication at home then do not decrease Frequency below that used at home.    0-3 - enter or revise RT bronchodilator order(s) to equivalent RT Bronchodilator order with Frequency of every 4 hours PRN for wheezing or increased work of breathing using Per Protocol order mode. 4-6 - enter or revise RT Bronchodilator order(s) to two equivalent RT bronchodilator orders with one order with BID Frequency and one order with Frequency of every 4 hours PRN wheezing or increased work of breathing using Per Protocol order mode. 7-10 - enter or revise RT Bronchodilator order(s) to two equivalent RT bronchodilator orders with one order with TID Frequency and one order with Frequency of every 4 hours PRN wheezing or increased work of breathing using Per Protocol order mode. 11-13 - enter or revise RT Bronchodilator order(s) to one equivalent RT bronchodilator order with QID Frequency and an Albuterol order with Frequency of every 4 hours PRN wheezing or increased work of breathing using Per Protocol order mode. Greater than 13 - enter or revise RT Bronchodilator order(s) to one equivalent RT bronchodilator order with every 4 hours Frequency and an Albuterol order with Frequency of every 2 hours PRN wheezing or increased work of breathing using Per Protocol order mode.        Electronically signed by Elissa Burns RCP on 2/25/2023 at 7:55 PM

## 2023-02-26 NOTE — H&P
Combined Hospital Medicine History & Physical & Discharge Summary    PCP: JASEN Holm CNP    Date of Admission: 2/25/2023    Date of Service: Pt seen/examined on 2/26/2023     Date of Discharge: 2/26/2023    Discharging Provider: Dr. Светлана Rolle     Chief Complaint:    Chief Complaint   Patient presents with    Dizziness     Pt just released from stay for PNU, new-onset AFIB, CHF. Here today bc pt became dizzy, fell, denies LOC or hitting head, experienced chest pains this morning          History Of Present Illness: The patient is a 77 y.o. male with pmhx of atrial fibrillation CHF, NICOLE, anxiety who presented to [facility] ED with complaint of dizziness and pre-syncope  Patient had an episode yesterday at his grand-daughters volleyball game where he felt very light headed and dizzy, started to almost fall down but his daughter and wife caught him and lowered him to the ground slowly. Denies LOC or hitting his head. Was recently started on cardizem, atenolol, diuretics. Takes all of them in the morning. When arrived in the ED HR was in the 30s. He feels much improved today. No fever, chills, chest pain, nausea, vomiting, diarrhea, hematuria, dysuria.       Past Medical History:        Diagnosis Date    Anxiety     Asthma     Atrial fibrillation ([address]     BPH     prostate 5/05,     CHF (congestive heart failure) ([address]     Dermatitis due to sun     severe    Hearing loss     decreased hearing left ear    Hyperlipidemia     Hypertension     pos GXT 3/01, neg GXT 3/07    Narcolepsy     Osteoarthritis     Pneumonia     Sleep apnea     bipap    Superficial peroneal nerve neuropathy     bilateral       Past Surgical History:        Procedure Laterality Date    APPENDECTOMY      CHOLECYSTECTOMY      COLONOSCOPY  12/19/11    KNEE SURGERY Right 04/22/2016    Rt total knee    OTHER SURGICAL HISTORY Right 09/01/2016    RIGHT TOTAL SHOULDER REPLACEMENT          SHOULDER ARTHROPLASTY  6/19/2012    Lt total shoulder arthroplasty    TOTAL KNEE ARTHROPLASTY Left 07/19/2016       Medications Prior to Admission:    Prior to Admission medications    Medication Sig Start Date End Date Taking?  Authorizing Provider   dilTIAZem (CARDIZEM CD) 240 MG extended release capsule Take 1 capsule by mouth daily 2/21/23   Trang Tsai MD   benzonatate (TESSALON) 100 MG capsule Take 1 capsule by mouth 3 times daily as needed for Cough 2/20/23 2/27/23  Trang Tsai MD   torsemide (DEMADEX) 20 MG tablet Take 1 tablet by mouth daily 2/21/23   Trang Tsai MD   apixaban (ELIQUIS) 5 MG TABS tablet Take 1 tablet by mouth 2 times daily 2/20/23   Trang Tsai MD   DULoxetine (CYMBALTA) 60 MG extended release capsule TAKE 1 CAPSULE EVERY DAY 11/28/22   JASEN Hamilton CNP   gabapentin (NEURONTIN) 300 MG capsule TAKE 2 CAPSULES THREE TIMES DAILY 11/28/22 2/28/23  JASEN Hamilton CNP   losartan (COZAAR) 100 MG tablet TAKE 1 TABLET EVERY DAY 11/28/22   JASEN Hamilton CNP   albuterol sulfate HFA (VENTOLIN HFA) 108 (90 Base) MCG/ACT inhaler Inhale 2 puffs into the lungs 4 times daily as needed for Wheezing 9/27/22   JASEN Hamilton CNP   ipratropium-albuterol (DUONEB) 0.5-2.5 (3) MG/3ML SOLN nebulizer solution Inhale 3 mLs into the lungs every 4 hours 9/27/22   JASEN Hamilton CNP   sulindac (CLINORIL) 200 MG tablet TAKE 1 TABLET EVERY DAY 9/8/22   JASEN Hamilton CNP   tamsulosin (FLOMAX) 0.4 MG capsule TAKE 1 CAPSULE TWICE DAILY 8/17/22   JASEN Hamilton CNP   atorvastatin (LIPITOR) 20 MG tablet TAKE 1 TABLET EVERY DAY 8/5/22   JASEN Hamilton CNP   atenolol (TENORMIN) 100 MG tablet TAKE 1 TABLET EVERY DAY 8/5/22   JASEN Hamilton CNP   sildenafil (VIAGRA) 50 MG tablet Take 50 mg by mouth as needed for Erectile Dysfunction    Historical Provider, MD   Handicap Placard MISC by Does not apply route 12/2/21   Devin Rice APRN - CNP   Multiple Vitamin (THERA) TABS Take 1 tablet by mouth daily. Historical Provider, MD   SAW ABHILASH Take  by mouth daily. Historical Provider, MD       Allergies:  Celebrex [celecoxib], Elavil [amitriptyline hcl], and Topamax    Social History:      TOBACCO:   reports that he quit smoking about 24 years ago. His smoking use included cigarettes. He has a 96.00 pack-year smoking history. He has never used smokeless tobacco.  ETOH:   reports no history of alcohol use. Family History:   Positive as follows:        Problem Relation Age of Onset    Cancer Mother         breast    High Blood Pressure Mother     Arthritis Father     Depression Father     Hearing Loss Father     Heart Disease Father     Stroke Father     Vision Loss Father        REVIEW OF SYSTEMS:       Constitutional: Negative for fever   HENT: Negative for sore throat   Eyes: Negative for redness   Respiratory: Negative  for dyspnea, cough   Cardiovascular: Negative for chest pain   Gastrointestinal: Negative for vomiting, diarrhea   Genitourinary: Negative for hematuria   Musculoskeletal: Negative for arthralgias   Skin: Negative for rash   Neurological: +pre-syncope   Hematological: Negative for adenopathy   Psychiatric/Behavorial: Negative for anxiety    PHYSICAL EXAM:    BP (!) 137/91   Pulse 74   Temp 97.7 °F (36.5 °C) (Oral)   Resp 19   Ht 5' 11\" (1.803 m)   Wt (!) 306 lb 4.8 oz (138.9 kg)   SpO2 96%   BMI 42.72 kg/m²     Gen: No distress. Alert. Pleasant male   Eyes: PERRL. No sclera icterus. No conjunctival injection. ENT: No discharge. Pharynx clear. Neck: No JVD. No Carotid Bruit. Trachea midline. Resp: No accessory muscle use. No crackles. No rhonchi. +trace wheezing in lung bases bilaterally   CV: +irregularly irregular No murmur. No rub. No edema. Peripheral Pulses: +2 palpable, equal bilaterally   GI: Non-tender. Non-distended. No masses. No organomegaly. Normal bowel sounds. No hernia. Skin: Warm and dry. No nodule on exposed extremities.  No rash on exposed extremities. M/S: No cyanosis. No joint deformity. No clubbing. Neuro: Awake. Grossly nonfocal    Psych: Oriented x 3. No anxiety or agitation. Jaime Bean MD have reviewed the chart on Renny Amin and personally interviewed and examined patient, reviewed the data (labs and imaging) and after discussion with my PA formulated the plan. Agree with note with the following edits. HPI:     I reviewed the patient's Past Medical History, Past Surgical History, Medications, and Allergies. 77 y.o. male with pmhx of atrial fibrillation CHF, NICOLE, anxiety who presented to [facility] ED with complaint of dizziness and pre-syncope  Patient had an episode yesterday at his grand-daughters volBluespecball game where he felt very light headed and dizzy, started to almost fall down but his daughter and wife caught him and lowered him to the ground slowly. Denies LOC or hitting his head. Was recently started on cardizem, atenolol, diuretics. Takes all of them in the morning. When arrived in the ED HR was in the 30s. He feels much improved today. No fever, chills, chest pain, nausea, vomiting, diarrhea, hematuria, dysuria. General: elderly male, obese, healthy appearing   Awake, alert and oriented. Appears to be not in any distress  Mucous Membranes:  Pink , anicteric  Neck: No JVD, no carotid bruit, no thyromegaly  Chest:  Clear to auscultation bilaterally, no added sounds  Cardiovascular:  RRR S1S2 heard, no murmurs or gallops  Abdomen:  Soft, undistended, non tender, no organomegaly, BS present  Extremities: resolved pedal edema  No edema or cyanosis.  Distal pulses well felt  Neurological : grossly normal            Lab Results   Component Value Date    WBC 13.2 (H) 02/25/2023    HGB 16.1 02/25/2023    HCT 48.0 02/25/2023    MCV 88.3 02/25/2023     02/25/2023     Lab Results   Component Value Date     02/25/2023    K 4.6 02/25/2023     02/25/2023    CO2 27 02/25/2023    BUN 30 (H) 02/25/2023    CREATININE 1.3 02/25/2023    GLUCOSE 133 (H) 02/25/2023    CALCIUM 9.3 02/25/2023    PROT 6.9 02/25/2023    LABALBU 4.2 02/25/2023    BILITOT 1.4 (H) 02/25/2023    ALKPHOS 74 02/25/2023    AST 16 02/25/2023    ALT 32 02/25/2023    LABGLOM 59 (A) 02/25/2023    GFRAA >60 12/07/2021    AGRATIO 1.6 02/25/2023    GLOB 3.0 05/06/2019           CARDIAC ENZYMES  Recent Labs     02/25/23  1257   TROPONINI <0.01       U/A:    Lab Results   Component Value Date/Time    NITRITE neg 01/05/2016 12:42 PM    COLORU Yellow 02/25/2023 04:55 PM    WBCUA 0-2 02/17/2023 11:32 AM    RBCUA 0-2 02/17/2023 11:32 AM    CLARITYU Clear 02/25/2023 04:55 PM    SPECGRAV 1.020 02/25/2023 04:55 PM    LEUKOCYTESUR Negative 02/25/2023 04:55 PM    BLOODU Negative 02/25/2023 04:55 PM    GLUCOSEU Negative 02/25/2023 04:55 PM         CULTURES  COVID and Flu not detected    EKG:  Atrial fibrillationwith slow ventricular responseRight bundle branch blockAbnormal ECGWhen compared with ECG of 25-FEB-2023 12:47, (unconfirmed)No significant change was foundConfirmed by Michele Chan MD, [address] ([id number]) on 2/25/2023 3:22:33 PM    RADIOLOGY  XR CHEST PORTABLE   Preliminary Result   Stable examination with cardiomegaly and mild central pulmonary vascular   congestion suggesting mild CHF. Pertinent previous results reviewed   Echocardiogram from 2/17/23  Summary  Technically limited study given tachycardia and afib. Left ventricular systolic function is normal with ejection fraction estimated at 55-60 %. No regional wall motion abnormalities are noted. There is mild concentric left ventricular hypertrophy with sigmoid shaped septum. Normal left ventricular diastolic filling pressure. Bi-atrial enlargement. The right ventricle is mildly enlarged. The aortic root is mildly dilated. The ascending aorta is moderately dilated. Mild mitral regurgitation. Mild aortic regurgitation is present.   Mild tricuspid regurgitation. Systolic pulmonary artery pressure (SPAP) estimated at 45 mmHg (RA pressure 15 mmHg), consistent with mild pulmonary  hypertension. Mild pulmonic regurgitation present. Consider repeat study when rate controlled if clinically indicate.d  Signature  Electronically signed by Diane Elise DO (Interpreting    ASSESSMENT/PLAN:    #Atrial fibrillation with slow ventricular response   #Near syncope and collapse   -rates as low as 30s   -recently started on cardizem, atenolol, and lasix holding these   -HR is 70s today --  -cardiology consulted ok for discharge and -> resumed cardizem- stop atenolol and reduced torsemide dose   -on eliquis for [facility]    #Chronic diastolic HF   -last echo as above   Well compensated  -continue torsemide--> reduced     #HTN   -on losartan, atenolol, cardizem, lasix   -as above    #Asthma without AE   -continue prn inhalers     #NICOLE   -BIPAP nightly    #Neuropathy   -gabapentin and cymbalta     #HLD  -on statin     Discharged home in stable condition     Follow up with PCP and cardiology     DVT Prophylaxis: eliquis   Diet: ADULT DIET; Regular;  No Added Salt (3-4 gm)  Code Status: Full Code    LUCAS Blas  02/26/23  10:39 AM    Ok to dc to home  D/w wife    Mateo Mederos MD, 2/26/2023 12:34 PM

## 2023-02-26 NOTE — PLAN OF CARE
HEART FAILURE CARE PLAN:    Comorbidities Reviewed: Yes   Patient has a past medical history of Anxiety, Asthma, Atrial fibrillation ([facility] [address] BPH, CHF (congestive heart failure) ([facility] [address] Dermatitis due to sun, Hearing loss, Hyperlipidemia, Hypertension, Narcolepsy, Osteoarthritis, Pneumonia, Sleep apnea, and Superficial peroneal nerve neuropathy. ECHOCARDIOGRAM Reviewed: Yes   Patient's Ejection Fraction (EF) is greater than 40%    Weights Reviewed: Yes   Admission weight: (!) 305 lb (138.3 kg)   Wt Readings from Last 3 Encounters:   02/26/23 (!) 306 lb 4.8 oz (138.9 kg)   02/23/23 (!) 327 lb (148.3 kg)   02/20/23 (!) 321 lb 12.8 oz (146 kg)     Intake & Output Reviewed: Yes     Intake/Output Summary (Last 24 hours) at 2/26/2023 0700  Last data filed at 2/26/2023 0320  Gross per 24 hour   Intake 300 ml   Output 925 ml   Net -625 ml     Medications Reviewed: Yes   SCHEDULED HOSPITAL MEDICATIONS:   apixaban  5 mg Oral BID    [Held by provider] atenolol  100 mg Oral Daily    atorvastatin  20 mg Oral Daily    DULoxetine  60 mg Oral Daily    gabapentin  300 mg Oral TID    ipratropium-albuterol  1 vial Inhalation 4x daily    [Held by provider] losartan  100 mg Oral Daily    therapeutic multivitamin-minerals  1 tablet Oral Daily    tamsulosin  0.4 mg Oral Daily    torsemide  20 mg Oral Daily    sodium chloride flush  5-40 mL IntraVENous 2 times per day     ACE/ARB/ARNI is REQUIRED for EF </= 05% SYSTOLIC FAILURE:   ACE[de-identified] None  ARB[de-identified] Losartan  ARNI[de-identified] None    Evidenced-Based Beta Blocker is REQUIRED for EF </= 75% SYSTOLIC FAILURE:   [de-identified]  Atenolol    Diuretics:  [de-identified] Torsemide    Diet Reviewed: Yes   Diet NPO    Goal of Care Reviewed: Yes   Patient and/or Family's stated Goal of Care this Admission: reduce shortness of breath, increase activity tolerance, better understand heart failure and disease management, be more comfortable, reduce lower extremity edema, and unable to assess, will attempt again prior to discharge.

## 2023-02-26 NOTE — CARE COORDINATION
Advance Care Planning     General Advance Care Planning (ACP) Conversation    Date of Conversation: 2/25/2023  Conducted with: Patient with Decision Making Capacity    Healthcare Decision Maker:    Primary Decision Maker: Maribell Angela - [facility] - [phone]  Click here to complete Healthcare Decision Makers including selection of the Healthcare Decision Maker Relationship (ie \"Primary\"). Today we documented Decision Maker(s) consistent with Legal Next of Kin hierarchy.     Content/Action Overview:  Has NO ACP documents/care preferences - information provided, considering goals and options  Reviewed DNR/DNI and patient elects Full Code (Attempt Resuscitation)    Length of Voluntary ACP Conversation in minutes:  <16 minutes (Non-Billable)    Mikey Bauman RN

## 2023-02-26 NOTE — CARE COORDINATION
Case Management Assessment  Initial Evaluation    Date/Time of Evaluation: 2/26/2023 10:26 AM  Assessment Completed by: Sebastian Palmer RN    If patient is discharged prior to next notation, then this note serves as note for discharge by case management. Patient Name: Racheal Cabrera                   YOB: 1955  Diagnosis: Syncope and collapse [R55]  Chronic atrial fibrillation ([address] [I48.20]  Symptomatic bradycardia [R00.1]  Chronic kidney disease, unspecified CKD stage [N18.9]                   Date / Time: 2/25/2023 12:42 PM    Patient Admission Status: Inpatient   Readmission Risk (Low < 19, Mod (19-27), High > 27): Readmission Risk Score: 14.8    Current PCP: JASEN Gillis CNP  PCP verified by CM? Yes    Chart Reviewed: Yes      History Provided by: Patient, Spouse  Patient Orientation: Alert and Oriented, Person, Place, Situation, Self    Patient Cognition: Alert    Hospitalization in the last 30 days (Readmission):  Yes    If yes, Readmission Assessment in CM Navigator will be completed. Advance Directives:      Code Status: Full Code   Patient's Primary Decision Maker is: Legal Next of Kin    Primary Decision Maker: Magui Kirk - Spouse - [phone]    Discharge Planning:    Patient lives with: Spouse/Significant Other Type of Home: Trailer/Mobile Home (4STE, holds on to house)  Primary Care Giver: Self  Patient Support Systems include: Spouse/Significant Other   Current Financial resources: Medicare  Current community resources: None  Current services prior to admission: Other (Comment) (using a borrowed CPAP, his BIPAP is broke. ..plan fu with pulm to get new one.)            Current DME:              Type of Home Care services:  None    ADLS  Prior functional level: Independent in ADLs/IADLs  Current functional level: Independent in ADLs/IADLs    PT AM-PAC:   /24  OT AM-PAC:   /24    Family can provide assistance at DC:  Yes  Would you like Case Management to discuss the discharge plan with any other family members/significant others, and if so, who? Yes  Plans to Return to Present Housing: Yes  Other Identified Issues/Barriers to RETURNING to current housing: none  Potential Assistance needed at discharge: [address]            Potential DME:    Patient expects to discharge to: [facility] for transportation at discharge: Family    Financial    Payor: Rosie Arnav / Plan: Maame Garcia / Product Type: *No Product type* /     Does insurance require precert for SNF: Yes    Potential assistance Purchasing Medications: No  Meds-to-Beds request:        [address] Omer Bass [phone] - F [phone]  [address] [facility] [address]  Phone: [phone] Fax: [phone]    [facility] [address], [address] [place] [address] Avenue [phone] Ashley Ruano [phone]  [facility] [address] [id number]  Phone: [phone] Fax: [phone]      Notes:    Factors facilitating achievement of predicted outcomes: Family support, Motivated, Cooperative, and Pleasant    Barriers to discharge: none    Additional Case Management Notes: spoke with patient and wife, bedside. Reported uses a cane prn for distances. Both he and wife drive. Will be abl eot get to ay follow up appts. Will be seeing pulm for new Bipap, his broke. Is currently using borrowed CPAP. Currently denied needs. The Plan for Transition of Care is related to the following treatment goals of Syncope and collapse [R55]  Chronic atrial fibrillation (HCC) [I48.20]  Symptomatic bradycardia [R00.1]  Chronic kidney disease, unspecified CKD stage [I12.2]    IF APPLICABLE: The Patient and/or patient representative Wilian Garcia and his family were provided with a choice of provider and agrees with the discharge plan.  Freedom of choice list with basic dialogue that supports the patient's individualized plan of care/goals and shares the quality data associated with the providers was provided to:     Patient Representative Name:       The Patient and/or Patient Representative Agree with the Discharge Plan? Arabella Maravilla RN  Case Management Department    D/c order noted. No further needs.  Arabella Maravilla RN

## 2023-02-26 NOTE — PROGRESS NOTES
Patient educated on discharge instructions as well as new medications use, dosage, administration and possible side effects. Patient verified knowledge. IV removed without difficulty and dry dressing in place. Telemetry monitor removed and returned to [facility]. Pt left facility in stable condition to home with all of their personal belongings.

## 2023-02-28 ENCOUNTER — OFFICE VISIT (OUTPATIENT)
Dept: CARDIOLOGY CLINIC | Age: 68
End: 2023-02-28
Payer: MEDICARE

## 2023-02-28 ENCOUNTER — OFFICE VISIT (OUTPATIENT)
Dept: PULMONOLOGY | Age: 68
End: 2023-02-28
Payer: MEDICARE

## 2023-02-28 ENCOUNTER — NURSE ONLY (OUTPATIENT)
Dept: FAMILY MEDICINE CLINIC | Age: 68
End: 2023-02-28

## 2023-02-28 ENCOUNTER — TELEPHONE (OUTPATIENT)
Dept: PULMONOLOGY | Age: 68
End: 2023-02-28

## 2023-02-28 VITALS
SYSTOLIC BLOOD PRESSURE: 92 MMHG | WEIGHT: 315 LBS | OXYGEN SATURATION: 97 % | HEIGHT: 71 IN | RESPIRATION RATE: 17 BRPM | BODY MASS INDEX: 44.1 KG/M2 | HEART RATE: 51 BPM | DIASTOLIC BLOOD PRESSURE: 54 MMHG

## 2023-02-28 VITALS
HEART RATE: 70 BPM | WEIGHT: 312.8 LBS | TEMPERATURE: 98.5 F | HEIGHT: 71 IN | DIASTOLIC BLOOD PRESSURE: 70 MMHG | BODY MASS INDEX: 43.79 KG/M2 | SYSTOLIC BLOOD PRESSURE: 100 MMHG | OXYGEN SATURATION: 93 %

## 2023-02-28 DIAGNOSIS — I48.19 PERSISTENT ATRIAL FIBRILLATION (HCC): ICD-10-CM

## 2023-02-28 DIAGNOSIS — I50.32 CHRONIC DIASTOLIC CONGESTIVE HEART FAILURE (HCC): ICD-10-CM

## 2023-02-28 DIAGNOSIS — I50.31 ACUTE DIASTOLIC CHF (CONGESTIVE HEART FAILURE) (HCC): ICD-10-CM

## 2023-02-28 DIAGNOSIS — G47.33 OSA (OBSTRUCTIVE SLEEP APNEA): ICD-10-CM

## 2023-02-28 DIAGNOSIS — R05.9 COUGH, UNSPECIFIED TYPE: ICD-10-CM

## 2023-02-28 DIAGNOSIS — R06.02 SOB (SHORTNESS OF BREATH): ICD-10-CM

## 2023-02-28 DIAGNOSIS — R93.89 ABNORMAL CT OF THE CHEST: Primary | ICD-10-CM

## 2023-02-28 DIAGNOSIS — R00.1 SYMPTOMATIC BRADYCARDIA: ICD-10-CM

## 2023-02-28 DIAGNOSIS — Z09 HOSPITAL DISCHARGE FOLLOW-UP: ICD-10-CM

## 2023-02-28 DIAGNOSIS — G47.33 OSA (OBSTRUCTIVE SLEEP APNEA): Primary | ICD-10-CM

## 2023-02-28 DIAGNOSIS — I95.2 HYPOTENSION DUE TO DRUGS: Primary | ICD-10-CM

## 2023-02-28 LAB
A/G RATIO: 1.5 (ref 1.1–2.2)
ALBUMIN SERPL-MCNC: 4.1 G/DL (ref 3.4–5)
ALP BLD-CCNC: 85 U/L (ref 40–129)
ALT SERPL-CCNC: 29 U/L (ref 10–40)
ANION GAP SERPL CALCULATED.3IONS-SCNC: 13 MMOL/L (ref 3–16)
AST SERPL-CCNC: 15 U/L (ref 15–37)
BILIRUB SERPL-MCNC: 1.3 MG/DL (ref 0–1)
BUN BLDV-MCNC: 30 MG/DL (ref 7–20)
CALCIUM SERPL-MCNC: 9.4 MG/DL (ref 8.3–10.6)
CHLORIDE BLD-SCNC: 102 MMOL/L (ref 99–110)
CO2: 26 MMOL/L (ref 21–32)
CREAT SERPL-MCNC: 1.5 MG/DL (ref 0.8–1.3)
GFR SERPL CREATININE-BSD FRML MDRD: 50 ML/MIN/{1.73_M2}
GLUCOSE BLD-MCNC: 82 MG/DL (ref 70–99)
POTASSIUM SERPL-SCNC: 4.6 MMOL/L (ref 3.5–5.1)
PRO-BNP: 564 PG/ML (ref 0–124)
SODIUM BLD-SCNC: 141 MMOL/L (ref 136–145)
TOTAL PROTEIN: 6.9 G/DL (ref 6.4–8.2)

## 2023-02-28 PROCEDURE — G8484 FLU IMMUNIZE NO ADMIN: HCPCS | Performed by: INTERNAL MEDICINE

## 2023-02-28 PROCEDURE — 93000 ELECTROCARDIOGRAM COMPLETE: CPT | Performed by: INTERNAL MEDICINE

## 2023-02-28 PROCEDURE — G8427 DOCREV CUR MEDS BY ELIG CLIN: HCPCS | Performed by: INTERNAL MEDICINE

## 2023-02-28 PROCEDURE — 1123F ACP DISCUSS/DSCN MKR DOCD: CPT | Performed by: INTERNAL MEDICINE

## 2023-02-28 PROCEDURE — G8417 CALC BMI ABV UP PARAM F/U: HCPCS | Performed by: INTERNAL MEDICINE

## 2023-02-28 PROCEDURE — 3074F SYST BP LT 130 MM HG: CPT | Performed by: INTERNAL MEDICINE

## 2023-02-28 PROCEDURE — 1111F DSCHRG MED/CURRENT MED MERGE: CPT | Performed by: INTERNAL MEDICINE

## 2023-02-28 PROCEDURE — 3017F COLORECTAL CA SCREEN DOC REV: CPT | Performed by: INTERNAL MEDICINE

## 2023-02-28 PROCEDURE — 1036F TOBACCO NON-USER: CPT | Performed by: INTERNAL MEDICINE

## 2023-02-28 PROCEDURE — 99204 OFFICE O/P NEW MOD 45 MIN: CPT | Performed by: INTERNAL MEDICINE

## 2023-02-28 PROCEDURE — 99214 OFFICE O/P EST MOD 30 MIN: CPT | Performed by: INTERNAL MEDICINE

## 2023-02-28 PROCEDURE — 3078F DIAST BP <80 MM HG: CPT | Performed by: INTERNAL MEDICINE

## 2023-02-28 RX ORDER — BUDESONIDE AND FORMOTEROL FUMARATE DIHYDRATE 160; 4.5 UG/1; UG/1
2 AEROSOL RESPIRATORY (INHALATION) 2 TIMES DAILY
Qty: 1 EACH | Refills: 5 | Status: SHIPPED | OUTPATIENT
Start: 2023-02-28

## 2023-02-28 NOTE — PATIENT INSTRUCTIONS
Plan:  Current epic labs from 2/26/23 reviewed with patient  Current medications reviewed. Refills given as warranted. Stop taking losartan since your bp is still low  Stop taking torsemide. If you gain 3 pounds over night or 5 pound in a week you can take 1/2 torsemide pill 5 mg as needed for swelling  Take atorvastatin and tamsulosin at bedtime.   Keep your follow up appointment with Dr. Nona Montano on 3/8/23  I recommend holding off on your procedure until you are feeling better    Follow up with Dr. Nona Montano 3/8/23

## 2023-02-28 NOTE — PROGRESS NOTES
[facility]                                                                    CHIEF COMPLAINT: Pulmonary nodules    Consulting provider: JASEN Grullon CNP      HPI:   Patient had CT chest 2/17/2023 in ED concerns for pneumonia. CT reviewed by me and showed small bilateral groundglass nodular opacities throughout bilateral lower lobes. Treated for PNA, had cough with yellow sputum at that time. Now occasional cough. No hemoptysis. No associated adenopathy. Not sure what makes it better or worse. Has SOB on exertion. Quit smoking 2000, smoked 1.5-2 ppd for 33 years. Uses Albuterol 3-4 times/day with help. Diagnosed with severe NICOLE 23 years ago was given BiPAP. BiPAP broke 5 years ago and he replaced it with CPAP on his own. Snoring, apnea and hypersomnia without CPAP. Uses his CPAP every night. Can not sleep without it. Past Medical History:   Diagnosis Date    A-fib [facility])     Anxiety     Asthma     Atrial fibrillation (HCC)     BPH     prostate 5/05,     CHF (congestive heart failure) ([facility] [address]     Dermatitis due to sun     severe    Hearing loss     decreased hearing left ear    Hyperlipidemia     Hypertension     pos GXT 3/01, neg GXT 3/07    Narcolepsy     Osteoarthritis     Pneumonia     Sleep apnea     bipap    Superficial peroneal nerve neuropathy     bilateral       Past Surgical History:        Procedure Laterality Date    APPENDECTOMY      CHOLECYSTECTOMY      COLONOSCOPY  12/19/11    KNEE SURGERY Right 04/22/2016    Rt total knee    OTHER SURGICAL HISTORY Right 09/01/2016    RIGHT TOTAL SHOULDER REPLACEMENT          SHOULDER ARTHROPLASTY  6/19/2012    Lt total shoulder arthroplasty    TOTAL KNEE ARTHROPLASTY Left 07/19/2016       Allergies:  is allergic to celebrex [celecoxib], elavil [amitriptyline hcl], and topamax. Social History:    TOBACCO:   reports that he quit smoking about 24 years ago.  His smoking use included cigarettes. He has a 96.00 pack-year smoking history. He has never used smokeless tobacco.  ETOH:   reports no history of alcohol use.       Family History:       Problem Relation Age of Onset    Cancer Mother         breast    High Blood Pressure Mother     Arthritis Father     Depression Father     Hearing Loss Father     Heart Disease Father     Stroke Father     Vision Loss Father        Current Medications:    Current Outpatient Medications:     budesonide-formoterol (SYMBICORT) 160-4.5 MCG/ACT AERO, Inhale 2 puffs into the lungs 2 times daily, Disp: 1 each, Rfl: 5    losartan (COZAAR) 100 MG tablet, Take 0.5 tablets by mouth daily (Patient taking differently: Take 50 mg by mouth daily HALF OF PILL), Disp: 15 tablet, Rfl: 0    torsemide (DEMADEX) 10 MG tablet, Take 1 tablet by mouth daily (Patient taking differently: Take 10 mg by mouth daily HALF A PILL), Disp: 30 tablet, Rfl: 3    dilTIAZem (CARDIZEM CD) 240 MG extended release capsule, Take 1 capsule by mouth daily, Disp: 30 capsule, Rfl: 3    apixaban (ELIQUIS) 5 MG TABS tablet, Take 1 tablet by mouth 2 times daily, Disp: 180 tablet, Rfl: 1    DULoxetine (CYMBALTA) 60 MG extended release capsule, TAKE 1 CAPSULE EVERY DAY, Disp: 90 capsule, Rfl: 1    gabapentin (NEURONTIN) 300 MG capsule, TAKE 2 CAPSULES THREE TIMES DAILY, Disp: 540 capsule, Rfl: 0    albuterol sulfate HFA (VENTOLIN HFA) 108 (90 Base) MCG/ACT inhaler, Inhale 2 puffs into the lungs 4 times daily as needed for Wheezing, Disp: 18 g, Rfl: 5    ipratropium-albuterol (DUONEB) 0.5-2.5 (3) MG/3ML SOLN nebulizer solution, Inhale 3 mLs into the lungs every 4 hours, Disp: 360 mL, Rfl: 1    sulindac (CLINORIL) 200 MG tablet, TAKE 1 TABLET EVERY DAY, Disp: 90 tablet, Rfl: 1    tamsulosin (FLOMAX) 0.4 MG capsule, TAKE 1 CAPSULE TWICE DAILY, Disp: 180 capsule, Rfl: 1    atorvastatin (LIPITOR) 20 MG tablet, TAKE 1 TABLET EVERY DAY, Disp: 90 tablet, Rfl: 1    sildenafil (VIAGRA) 50 MG tablet, Take 50 mg by mouth as needed for Erectile Dysfunction, Disp: , Rfl:     Handicap Placard MISC, by Does not apply route, Disp: 1 each, Rfl: 0    Multiple Vitamin (THERA) TABS, Take 1 tablet by mouth daily. , Disp: , Rfl:     SAW PALMETTO, Take  by mouth daily. , Disp: , Rfl:       REVIEW OF SYSTEMS:  Constitutional: Negative for fever  HENT: Negative for sore throat  Eyes: Negative for redness   Respiratory: + dyspnea, cough  Cardiovascular: Negative for chest pain  Gastrointestinal: Negative for vomiting, diarrhea   Genitourinary: Negative for hematuria   Musculoskeletal: Negative for arthralgias   Skin: Negative for rash  Neurological: Negative for syncope  Hematological: Negative for adenopathy  Psychiatric/Behavorial: Negative for anxiety      Objective:   PHYSICAL EXAM:    BP (!) 92/54 (Site: Right Upper Arm, Position: Sitting, Cuff Size: Large Adult)   Pulse 51   Resp 17   Ht 5' 11\" (1.803 m)   Wt (!) 315 lb (142.9 kg)   SpO2 97% Comment: on ra  BMI 43.93 kg/m²   Gen: No distress. Eyes: PERRL. No sclera icterus. No conjunctival injection. ENT: No discharge. Pharynx clear. Neck: Trachea midline. No obvious mass. Resp: No accessory muscle use. No crackles. No wheezes. No rhonchi. No dullness on percussion. Good air entry. CV: Regular rate. Regular rhythm. No murmur or rub. 1+ LE edema. GI: Non-tender. Non-distended. No hernia. Skin: Warm and dry. No nodule on exposed extremities. Lymph: No cervical LAD. No supraclavicular LAD. M/S: No cyanosis. No joint deformity. No clubbing. Neuro: Awake. Alert. Moves all four extremities. Psych: Oriented x 3. No anxiety. DATA reviewed by me:   CT chest 2/17/2023 imaging reviewed by me and showed  No PE  Small groundglass nodular opacities      Assessment:       Abnormal CT chest 2/17/2023 with mosaic attenuation and GGO nodular opacities. Likely obstructive airway disease with superimposed edema/atypical pneumonia.   Patient was treated for pneumonia at that time.  SOB and cough-likely obstructive airway disease  Childhood asthma   Severe NICOLE on CPAP   Snoring, apnea and hypersomnia without CPAP.  Quit smoking 2000, smoked 1.5-2 ppd for 33 years        Plan:       PFTs and 6MW  Trial of Symbicort 160/4.5 2 puffs BID   Continue Albuterol 2 puffs Q4-6 hrs PRN  CT chest 3 months to document resolution of abnormalities  Obtain old sleep studies records   Full night CPAP/BiPAP titration   Continue CPAP 6-8 hrs at night and during naps.

## 2023-02-28 NOTE — PROGRESS NOTES
[facility] Office follow up Note  2023     Subjective:  Mr. Carey Duran is here for bradycardia afib and dizziness s/p recent hospital discharge 23  PMH CHF, Cardiomegaly  C/o dizzy, low bp, low heart rate    HPI:   Admitted -23 with complaints of near passing out. He was walking to see grand daughters Yeison dyson before reaching [facility] he felt very dizzy and had to sit down. He felt he was weak and going to faint. He did not lose consciousness. Symptoms lasted about 15-20 min at least. Patient came to ED. He was noted to have slow heart rate at 41 per min  His meds were reduced and he was to follow up in office but his condition has not improved. About 10 days ago he was admitted here with pneumonia and diagnosed with afib and CHF. Since then he has lost 34 lbs. Today, he reports he gets dizzy, his heart rate and bp drop about 2 hours after he takes his medication. He feels like he is drunk until evening when the medication starts to wear off. He reports home bp drops below 100. His bp this morning was 121/74 before he took his medication. He was at the office of pulmonologist 4 hrs later his systolic was in 04'E  Patient denies current edema, chest pain, sob, palpitations, or syncope. Patient is taking all cardiac medications as prescribed and tolerates them well. Patient is vaccinated against Covid. Moderna     12 point ROS negative in all areas as listed below except as in [place]  Constitutional, EENT, pulmonary, GI, , Musculoskeletal, skin, neurological, hematological, endocrine, Psychiatric    Reviewed past medical history, social, and family history.    Smokin  Alcohol:0  Recreational Drugs:0  :    Past Medical History:   Diagnosis Date    A-fib ([facility] [address]     Anxiety     Asthma     Atrial fibrillation (HCC)     BPH     prostate ,     CHF (congestive heart failure) ([facility] [address]     Dermatitis due to sun     severe    Hearing loss     decreased hearing left ear Hyperlipidemia     Hypertension     pos GXT 3/01, neg GXT 3/07    Narcolepsy     Osteoarthritis     Pneumonia     Sleep apnea     bipap    Superficial peroneal nerve neuropathy     bilateral     Past Surgical History:   Procedure Laterality Date    APPENDECTOMY      CHOLECYSTECTOMY      COLONOSCOPY  12/19/11    KNEE SURGERY Right 04/22/2016    Rt total knee    OTHER SURGICAL HISTORY Right 09/01/2016    RIGHT TOTAL SHOULDER REPLACEMENT          SHOULDER ARTHROPLASTY  6/19/2012    Lt total shoulder arthroplasty    TOTAL KNEE ARTHROPLASTY Left 07/19/2016       Objective:   /70   Pulse 70   Temp 98.5 °F (36.9 °C)   Ht 5' 11\" (1.803 m)   Wt (!) 312 lb 12.8 oz (141.9 kg)   SpO2 93%   BMI 43.63 kg/m²     Wt Readings from Last 3 Encounters:   02/28/23 (!) 312 lb 12.8 oz (141.9 kg)   02/28/23 (!) 315 lb (142.9 kg)   02/26/23 (!) 306 lb 4.8 oz (138.9 kg)       Physical Exam:  General: No Respiratory distress, appears well developed and well nourished. Eyes:  Sclera nonicteric  Nose/Sinuses:  negative findings: nose shows no deformity, asymmetry, or inflammation, nasal mucosa normal, septum midline with no perforation or bleeding  Back:  no pain to palpation  Joint:  no active joint inflammation  Musculoskeletal:  negative  Skin:  Warm and dry  Neck:  Negative for JVD and Carotid Bruits. Chest:  expiratory wheeze bilaterally to auscultation, respiration easy  Cardiovascular:  irreg irreg S1 variable S2 normal, no murmur, no rub or thrill.   Abdomen:  Soft normal liver and spleen  Extremities:   No edema, clubbing, cyanosis,  Pulses:  pedal pulses are normal.  Neuro: intact    Medications:   Outpatient Encounter Medications as of 2/28/2023   Medication Sig Dispense Refill    budesonide-formoterol (SYMBICORT) 160-4.5 MCG/ACT AERO Inhale 2 puffs into the lungs 2 times daily 1 each 5    dilTIAZem (CARDIZEM CD) 240 MG extended release capsule Take 1 capsule by mouth daily 30 capsule 3    apixaban (ELIQUIS) 5 MG TABS tablet Take 1 tablet by mouth 2 times daily 180 tablet 1    DULoxetine (CYMBALTA) 60 MG extended release capsule TAKE 1 CAPSULE EVERY DAY 90 capsule 1    gabapentin (NEURONTIN) 300 MG capsule TAKE 2 CAPSULES THREE TIMES DAILY 540 capsule 0    albuterol sulfate HFA (VENTOLIN HFA) 108 (90 Base) MCG/ACT inhaler Inhale 2 puffs into the lungs 4 times daily as needed for Wheezing 18 g 5    ipratropium-albuterol (DUONEB) 0.5-2.5 (3) MG/3ML SOLN nebulizer solution Inhale 3 mLs into the lungs every 4 hours 360 mL 1    sulindac (CLINORIL) 200 MG tablet TAKE 1 TABLET EVERY DAY 90 tablet 1    tamsulosin (FLOMAX) 0.4 MG capsule TAKE 1 CAPSULE TWICE DAILY (Patient taking differently: Take 0.4 mg by mouth at bedtime) 180 capsule 1    atorvastatin (LIPITOR) 20 MG tablet TAKE 1 TABLET EVERY DAY 90 tablet 1    sildenafil (VIAGRA) 50 MG tablet Take 50 mg by mouth as needed for Erectile Dysfunction      Handicap Placard MISC by Does not apply route 1 each 0    Multiple Vitamin (THERA) TABS Take 1 tablet by mouth daily. SAW PALMETTO Take  by mouth daily. [DISCONTINUED] losartan (COZAAR) 100 MG tablet Take 0.5 tablets by mouth daily (Patient taking differently: Take 50 mg by mouth daily HALF OF PILL) 15 tablet 0    [DISCONTINUED] torsemide (DEMADEX) 10 MG tablet Take 1 tablet by mouth daily (Patient taking differently: Take 10 mg by mouth daily HALF A PILL) 30 tablet 3     No facility-administered encounter medications on file as of 2/28/2023.         Lab Data:  CBC:   Recent Labs     02/26/23  1051   WBC 11.6*   HGB 14.9   HCT 46.8   MCV 90.8        BMP:   Recent Labs     02/28/23  0806      K 4.6      CO2 26   BUN 30*   CREATININE 1.5*     LIVER PROFILE:   Recent Labs     02/28/23  0806   AST 15   ALT 29   BILITOT 1.3*   ALKPHOS 85     LIPID:   Lab Results   Component Value Date    CHOL 193 05/06/2019    CHOL 165 12/15/2016    CHOL 151 03/08/2016     Lab Results   Component Value Date    TRIG 106 05/06/2019    TRIG 86 12/15/2016    TRIG 61 03/08/2016     Lab Results   Component Value Date    HDL 41 12/14/2022    HDL 51 12/07/2021    HDL 45 03/10/2020     Lab Results   Component Value Date    LDLCALC 66 12/14/2022    LDLCALC 56 12/07/2021    LDLCALC 47 03/10/2020     Lab Results   Component Value Date    LABVLDL 15 12/14/2022    LABVLDL 22 12/07/2021    LABVLDL 15 03/10/2020     No results found for: CHOLHDLRATIO  PT/INR: No results for input(s): PROTIME, INR in the last 72 hours. A1C:   Lab Results   Component Value Date    LABA1C 5.2 12/14/2022     BNP:  No results for input(s): BNP in the last 72 hours. IMAGING:   EKG 2.28.23  afib ventricular rate 65 RBBB   Echo 2.18.23    Summary   Technically limited study given tachycardia and afib. Left ventricular systolic function is normal with ejection fraction   estimated at 55-60 %. No regional wall motion abnormalities are noted. There is mild concentric left ventricular hypertrophy with sigmoid shaped   septum. Normal left ventricular diastolic filling pressure. Bi-atrial enlargement. The right ventricle is mildly enlarged. The aortic root is mildly dilated. The ascending aorta is moderately dilated. Mild mitral regurgitation. Mild aortic regurgitation is present. Mild tricuspid regurgitation. Systolic pulmonary artery pressure (SPAP) estimated at 45 mmHg (RA pressure   15 mmHg), consistent with mild pulmonary hypertension. Mild pulmonic regurgitation present. Consider repeat study when rate controlled if clinically indicate.d     EKG   Atrial fibrillationwith slow ventricular responseRight bundle branch blockAbnormal ECGWhen compared with ECG of 25-FEB-2023 12:47, (unconfirmed)No significant change was foundConfirmed by Jean Holliday MD, [address] ([id number]) on 2/25/2023 3:22:33 PM Atrial fibrillation with slow ventricular responseRight bundle branch blockAbnormal ECGWhen compared with ECG of 18-FEB-2023 12:28,Vent.  rate has decreased BY 30 BPMNonspecific T wave abnormality, improved in Inferior leadsNonspecific T wave abnormality no longer evident in Anterolateral leadsQT has shortenedConfirmed by Estrellita Cranker MD, [place] ([id number]) on 2/25/2023 3:21:46 PM     CXRAY 2.25.23      FINDINGS:   Cardiomegaly. Cardiomediastinal silhouette and bony thorax are unchanged. Stable appearance of the lungs with mild pulmonary vascular congestion   interstitial pulmonary edema. No pneumothorax or pleural effusion. Bilateral shoulder arthroplasties. Impression   Stable examination with cardiomegaly and mild central pulmonary vascular   congestion suggesting mild CHF. Assessment:  Frantz Cavanaugh was seen today for follow-up from hospital, congestive heart failure, hypertension, hyperlipidemia and dizziness. Diagnoses and all orders for this visit:    Hypotension due to drugs    Persistent atrial fibrillation (HCC)  -     EKG 12 lead    Chronic diastolic congestive heart failure (HCC)  -     EKG 12 lead    Symptomatic bradycardia  -     EKG 12 lead        Plan:  Current epic labs from 2/26/23 reviewed with patient  Current medications reviewed. Meds adjusted Refills given as warranted. Stop taking losartan since your bp is still low  Stop taking torsemide. Monitor for dyspnea and weight gain. If you gain 3 pounds over night or 5 pound in a week you can take 1/2 torsemide pill 5 mg as needed for swelling  Take atorvastatin and tamsulosin at bedtime. Keep your follow up appointment   I recommend holding off on your procedure until you are feeling better    Follow up with Olvin Mclean NP 3/8/23    This note is scribed in the presence of Dr. Tab Higginbotham MD by Stephanie Paul RN.    I, Dr. Judith Jacobs, personally performed the services described in this documentation, as scribed by the above signed scribe in my presence. It is both accurate and complete to my knowledge.  I agree with the details independently gathered by the clinical support staff, while the remaining scribed note accurately describes my personal service to the patient. QUALITY MEASURES  1. Tobacco Cessation Counseling: NA  2. Retake of BP if >140/90:   NA  3. Documentation to PCP/referring for new patient:  Sent to PCP at close of office visit  4. CAD patient on anti-platelet: NA  5. CAD patient on STATIN therapy:  Yes  6.  Patient with CHF and aFib on anticoagulation:  Yes     Funmilayo Celis MD, MD 2/28/2023 8:07 PM

## 2023-03-03 NOTE — PROGRESS NOTES
Physician Progress Note      PATIENT:               Alfonso Shook  [facility] #:                  [id number]  :                       1955  ADMIT DATE:       2023 12:42 PM  [address] [place] DATE:        2023 1:29 PM  RESPONDING  PROVIDER #:        Harjinder Saldaña        QUERY TEXT:    Stage of Chronic Kidney Disease: Please provide further specificity, if known. Clinical indicators include:  Options provided:  -- Chronic kidney disease stage 1  -- Chronic kidney disease stage 2  -- Chronic kidney disease stage 3  -- Chronic kidney disease stage 3a  -- Chronic kidney disease stage 3b  -- Chronic kidney disease stage 4  -- Chronic kidney disease stage 5  -- Chronic kidney disease stage 5, requiring dialysis  -- End stage renal disease  -- Other - I will add my own diagnosis  -- Disagree - Not applicable / Not valid  -- Disagree - Clinically Unable to determine / Unknown        PROVIDER RESPONSE TEXT:    Provider dismissed this query because it was not applicable to the patient or   not a valid query. No CKD, its an KAVYA          QUERY TEXT:    Pt admitted with syncope. Per the cardiology consult note on -His symptoms   are related to hypo-perfusion of brain due to bradycardia and probably   hypovolemia due to 34 lbs fluid loss in last two weeks. If possible, please   document in progress notes and discharge summary if treating the following:     The medical record reflects the following:  Risk Factors: advanced age, recent hospitalization for new afib/CHF, htn,   obesity  Clinical Indicators: per cardio consult: I recommend that the patient undergo    medication reduction  His symptoms are related to hypo-perfusion of brain due to bradycardia and   probably hypovolemia due to 34 lbs fluid loss in last two weeks, rates as low   as 30's  Treatment: cardiology consult, tele, stop atenolol, reduce dose of demadex    Thank you,  Sugey Tellez RN,BSN,CCDS,CRCR  Options provided:  -- Syncope due to hypo-perfusion of brain due to bradycardia and probably   hypovolemia  -- Syncope due to, please specify underlying cause. -- Other - I will add my own diagnosis  -- Disagree - Not applicable / Not valid  -- Disagree - Clinically unable to determine / Unknown  -- Refer to Clinical Documentation Reviewer    PROVIDER RESPONSE TEXT:    The patient with syncope due to hypo-perfusion of brain due to bradycardia and   probably hypovolemia.     Query created by: Ynes Wooten on 3/2/2023 7:27 AM      Electronically signed by:  Edward Arias 3/3/2023 1:26 PM

## 2023-03-08 ENCOUNTER — OFFICE VISIT (OUTPATIENT)
Dept: CARDIOLOGY CLINIC | Age: 68
End: 2023-03-08
Payer: MEDICARE

## 2023-03-08 VITALS
BODY MASS INDEX: 44.02 KG/M2 | WEIGHT: 314.4 LBS | HEIGHT: 71 IN | HEART RATE: 56 BPM | TEMPERATURE: 98.7 F | SYSTOLIC BLOOD PRESSURE: 130 MMHG | OXYGEN SATURATION: 95 % | DIASTOLIC BLOOD PRESSURE: 86 MMHG

## 2023-03-08 DIAGNOSIS — I48.19 PERSISTENT ATRIAL FIBRILLATION (HCC): ICD-10-CM

## 2023-03-08 DIAGNOSIS — I50.32 CHRONIC DIASTOLIC (CONGESTIVE) HEART FAILURE (HCC): Primary | ICD-10-CM

## 2023-03-08 PROCEDURE — 3075F SYST BP GE 130 - 139MM HG: CPT | Performed by: NURSE PRACTITIONER

## 2023-03-08 PROCEDURE — 99215 OFFICE O/P EST HI 40 MIN: CPT | Performed by: NURSE PRACTITIONER

## 2023-03-08 PROCEDURE — G8427 DOCREV CUR MEDS BY ELIG CLIN: HCPCS | Performed by: NURSE PRACTITIONER

## 2023-03-08 PROCEDURE — G8484 FLU IMMUNIZE NO ADMIN: HCPCS | Performed by: NURSE PRACTITIONER

## 2023-03-08 PROCEDURE — 1036F TOBACCO NON-USER: CPT | Performed by: NURSE PRACTITIONER

## 2023-03-08 PROCEDURE — 1123F ACP DISCUSS/DSCN MKR DOCD: CPT | Performed by: NURSE PRACTITIONER

## 2023-03-08 PROCEDURE — 3079F DIAST BP 80-89 MM HG: CPT | Performed by: NURSE PRACTITIONER

## 2023-03-08 PROCEDURE — 3017F COLORECTAL CA SCREEN DOC REV: CPT | Performed by: NURSE PRACTITIONER

## 2023-03-08 PROCEDURE — G8417 CALC BMI ABV UP PARAM F/U: HCPCS | Performed by: NURSE PRACTITIONER

## 2023-03-08 PROCEDURE — 1111F DSCHRG MED/CURRENT MED MERGE: CPT | Performed by: NURSE PRACTITIONER

## 2023-03-08 ASSESSMENT — ENCOUNTER SYMPTOMS
GASTROINTESTINAL NEGATIVE: 1
SHORTNESS OF BREATH: 1

## 2023-03-08 NOTE — PROGRESS NOTES
[address]   Cardiology Note              Date:  March 8, 2023  Patientname: Merna Flores  YOB: 1955    Primary Care physician: JASEN Torres CNP    HISTORY OF PRESENT ILLNESS: Merna Flores is a 77 y.o. male with a history of afib, HFpEF, HTN, HLD, NICOLE. He was admitted 2/17/2023 for SOB and palpitations. Found to have new afib and CHF. Echo showed EF 55-60%. Discharged 2/20 on diltiazem, atenolol, eliquis, torsemide. He was readmitted 2/25/2023 for dizziness and pre syncope. EKG showed AF rate 46. Atenolol stopped, torsemide decreased to 10 mg daily. At office visit 2/28/2023, he had continued dizziness and weight loss, losartan stopped and torsemide changed to prn only. Today he presents for follow up for afib and CHF. He is feeling better since medications decreased. Dizziness has improved but occasionally feels off balance. Shortness of breath has significantly improved though he has shortness of breath and fatigue with strenuous exertion, such as walking to and from his barn (about 200 yards with slight incline) he needs to stop and rest at the end. This has been going on for a few years. He has no chest pain. Edema improved. He is compliant with CPAP. Home BP 110s-120s/70s, HR 70s-90s. Home weight stable 300-304 lbs. Office weight today 3/8/2023: 314 lbs (304 lbs on home scale)  Office weight 2/28/2023: 312 lbs    Cardiologist: Dr. Caridad Narayanan and Dr. Brad De La Torre both evaluated 2/2023    Past Medical History:   has a past medical history of A-fib ([address] Anxiety, Asthma, Atrial fibrillation ([address] BPH, CHF (congestive heart failure) ([address] Dermatitis due to sun, Hearing loss, Hyperlipidemia, Hypertension, Narcolepsy, Osteoarthritis, Pneumonia, Sleep apnea, and Superficial peroneal nerve neuropathy. Past Surgical History:   has a past surgical history that includes Cholecystectomy; Appendectomy; Colonoscopy (12/19/11);  Total shoulder arthroplasty (6/19/2012); knee surgery (Right, 04/22/2016); Total knee arthroplasty (Left, 07/19/2016); and other surgical history (Right, 09/01/2016). Home Medications:    Prior to Admission medications    Medication Sig Start Date End Date Taking? Authorizing Provider   budesonide-formoterol (SYMBICORT) 160-4.5 MCG/ACT AERO Inhale 2 puffs into the lungs 2 times daily 2/28/23  Yes Maynor Ramires MD   dilTIAZem (CARDIZEM CD) 240 MG extended release capsule Take 1 capsule by mouth daily 2/21/23  Yes Watson Maravilla MD   apixaban (ELIQUIS) 5 MG TABS tablet Take 1 tablet by mouth 2 times daily 2/20/23  Yes Watson Maravilla MD   DULoxetine (CYMBALTA) 60 MG extended release capsule TAKE 1 CAPSULE EVERY DAY 11/28/22  Yes JASEN Veras CNP   gabapentin (NEURONTIN) 300 MG capsule TAKE 2 CAPSULES THREE TIMES DAILY 11/28/22 3/8/23 Yes JASEN Veras CNP   albuterol sulfate HFA (VENTOLIN HFA) 108 (90 Base) MCG/ACT inhaler Inhale 2 puffs into the lungs 4 times daily as needed for Wheezing 9/27/22  Yes JASEN Veras CNP   ipratropium-albuterol (DUONEB) 0.5-2.5 (3) MG/3ML SOLN nebulizer solution Inhale 3 mLs into the lungs every 4 hours 9/27/22  Yes JASEN Veras CNP   sulindac (CLINORIL) 200 MG tablet TAKE 1 TABLET EVERY DAY 9/8/22  Yes JASEN Veras CNP   tamsulosin (FLOMAX) 0.4 MG capsule TAKE 1 CAPSULE TWICE DAILY  Patient taking differently: Take 0.4 mg by mouth at bedtime 8/17/22  Yes JASEN Veras CNP   atorvastatin (LIPITOR) 20 MG tablet TAKE 1 TABLET EVERY DAY 8/5/22  Yes JASEN Veras CNP   sildenafil (VIAGRA) 50 MG tablet Take 50 mg by mouth as needed for Erectile Dysfunction   Yes Historical Provider, MD   Handicap Placard MISC by Does not apply route 12/2/21  Yes JASEN Veras CNP   Multiple Vitamin (THERA) TABS Take 1 tablet by mouth daily. Yes Historical Provider, MD KRIS HUNG Take  by mouth daily.    Yes Historical Provider, MD     Allergies:  Celebrex [celecoxib], Elavil [amitriptyline hcl], and Topamax    Social History:   reports that he quit smoking about 24 years ago. His smoking use included cigarettes. He has a 96.00 pack-year smoking history. He has never used smokeless tobacco. He reports that he does not drink alcohol and does not use drugs.     Family History: family history includes Arthritis in his father; Cancer in his mother; Depression in his father; Hearing Loss in his father; Heart Disease in his father; High Blood Pressure in his mother; Stroke in his father; Vision Loss in his father.    Review of Systems   Review of Systems   Constitutional:  Positive for fatigue.   Respiratory:  Positive for shortness of breath.    Cardiovascular: Negative.    Gastrointestinal: Negative.    Neurological:  Positive for light-headedness.     OBJECTIVE:    Vital signs:    /86   Pulse 56   Temp 98.7 °F (37.1 °C)   Ht 5' 11\" (1.803 m)   Wt (!) 314 lb 6.4 oz (142.6 kg)   SpO2 95%   BMI 43.85 kg/m²      Physical Exam:  Constitutional:  Comfortable and alert, NAD, appears stated age, obese  Eyes: PERRL, sclera nonicteric  Neck:  Supple, no masses, no thyroidmegaly, no JVD  Skin:  Warm and dry; no rash or lesions  Heart:  Irregular, normal apex, S1 and S2 normal, no M/G/R  Lungs:  Normal respiratory effort; clear; no wheezing/rhonchi/rales  Abdomen: soft, non tender, + bowel sounds  Extremities:  No edema or cyanosis; no clubbing  Neuro: alert and oriented, moves legs and arms equally, normal mood and affect    Data Reviewed:      EKG 2/28/2023:  AF RBBB    Echo 2/18/2023:   Technically limited study given tachycardia and afib.   Left ventricular systolic function is normal with ejection fraction   estimated at 55-60 %.   No regional wall motion abnormalities are noted.   There is mild concentric left ventricular hypertrophy with sigmoid shaped   septum.   Normal left ventricular diastolic filling pressure.   Bi-atrial enlargement.   The right ventricle is mildly enlarged.    The aortic root is mildly dilated. The ascending aorta is moderately dilated. Mild mitral regurgitation. Mild aortic regurgitation is present. Mild tricuspid regurgitation. Systolic pulmonary artery pressure (SPAP) estimated at 45 mmHg (RA pressure   15 mmHg), consistent with mild pulmonary hypertension. Mild pulmonic regurgitation present. Cardiology Labs Reviewed:   CBC: No results for input(s): WBC, HGB, HCT, PLT in the last 72 hours. BMP:No results for input(s): NA, K, CO2, BUN, CREATININE, LABGLOM, GLUCOSE in the last 72 hours. PT/INR: No results for input(s): PROTIME, INR in the last 72 hours. APTT:No results for input(s): APTT in the last 72 hours. FASTING LIPID PANEL:  Lab Results   Component Value Date/Time    HDL 41 12/14/2022 08:40 AM    HDL 48 08/20/2011 09:44 AM    LDLCALC 66 12/14/2022 08:40 AM    TRIG 106 05/06/2019 09:35 AM     LIVER PROFILE:No results for input(s): AST, ALT, ALB in the last 72 hours. BNP:   Lab Results   Component Value Date/Time    PROBNP 564 02/28/2023 08:06 AM    PROBNP 2,532 02/25/2023 12:57 PM    PROBNP 2,798 02/20/2023 05:13 AM    PROBNP 4,870 02/17/2023 10:55 AM     Reviewed all labs and imaging today    Assessment:   Dizziness, near syncope: due to hypotension/hypovolemia, now improved on decreased meds  Persistent atrial fibrillation: ongoing but stable   - [id number] score 3 (CHF, HTN, age) on eliquis   - diagnosed 2/2023  HFpEF: compensated  HTN: controlled  HLD  NICOLE: compliant with CPAP    Plan:   1. Reviewed afib therapies/interventions (DCCV, ablation, pharmacologic) in detail today with patient and his wife. All questions answered. Because he is feeling better, he would like to defer at this time. 2. Recheck BMP due to prior KAVYA  3. Continue diltiazem 240 mg daily; atenolol stopped due to bradycardia  4. Continue statin, eliquis  5. Losartan stopped due to hypotension  6.  Torsemide 5 mg PRN for shortness of breath, edema, weight gain; developed dehydration on scheduled 20 mg daily; currently euvolemic and has not needed prn dose  7. Stay active along with a healthy diet  8. Check BP at home and call the office if consistently out of goal range  9. Follow up in 2 months with Dr. Yessica Recinos at [facility] ([facility])    More than 45 minutes of time was spent in direct patient contact during this visit, more than 50% of which was spent educating regarding CHF, medication details, afib and lifestyle modifications.     Nita Alcala, APRN-CNP  [address]  [phone]

## 2023-03-09 ENCOUNTER — TELEPHONE (OUTPATIENT)
Dept: CARDIOLOGY CLINIC | Age: 68
End: 2023-03-09

## 2023-03-09 ENCOUNTER — HOSPITAL ENCOUNTER (OUTPATIENT)
Age: 68
Discharge: HOME | End: 2023-03-09
Payer: MEDICARE

## 2023-03-09 DIAGNOSIS — I50.32 CHRONIC DIASTOLIC (CONGESTIVE) HEART FAILURE (HCC): ICD-10-CM

## 2023-03-09 LAB
ANION GAP SERPL CALCULATED.3IONS-SCNC: 8 MMOL/L (ref 3–16)
BUN BLDV-MCNC: 37 MG/DL (ref 7–20)
CALCIUM SERPL-MCNC: 9.6 MG/DL (ref 8.3–10.6)
CHLORIDE BLD-SCNC: 108 MMOL/L (ref 99–110)
CO2: 26 MMOL/L (ref 21–32)
CREAT SERPL-MCNC: 1.2 MG/DL (ref 0.8–1.3)
GFR SERPL CREATININE-BSD FRML MDRD: >60 ML/MIN/{1.73_M2}
GLUCOSE BLD-MCNC: 95 MG/DL (ref 70–99)
POTASSIUM SERPL-SCNC: 4.3 MMOL/L (ref 3.5–5.1)
SODIUM BLD-SCNC: 142 MMOL/L (ref 136–145)

## 2023-03-09 PROCEDURE — 80048 BASIC METABOLIC PNL TOTAL CA: CPT

## 2023-03-09 PROCEDURE — 36415 COLL VENOUS BLD VENIPUNCTURE: CPT

## 2023-03-09 NOTE — TELEPHONE ENCOUNTER
----- Message from JASEN Castle - CNP sent at 3/9/2023 12:27 PM EST -----  Please let him know that kidney function has improved from prior. No changes and follow up as planned. Thanks!

## 2023-03-09 NOTE — TELEPHONE ENCOUNTER
Created telephone encounter. Spoke with David James relayed message per [facility] regarding labs. Pt wife verbalized understanding.

## 2023-03-10 DIAGNOSIS — J45.20 MILD INTERMITTENT ASTHMA WITHOUT COMPLICATION: ICD-10-CM

## 2023-03-10 RX ORDER — IPRATROPIUM BROMIDE AND ALBUTEROL SULFATE 2.5; .5 MG/3ML; MG/3ML
SOLUTION RESPIRATORY (INHALATION)
Qty: 720 ML | Refills: 3 | Status: SHIPPED | OUTPATIENT
Start: 2023-03-10

## 2023-03-14 ENCOUNTER — TELEPHONE (OUTPATIENT)
Dept: CARDIOLOGY CLINIC | Age: 68
End: 2023-03-14

## 2023-03-14 NOTE — LETTER
March 14, 2023       [address] [place] [id number]      Dear Farideh Cardoso this may concern. Garcia Course is ok to proceed from a cardiac perspective and ok to hold eliquis 2 days prior to procedure. Recommend restarting as soon as possible after procedure    If you have any questions or concerns, please don't hesitate to call.     Sincerely,        Marlyn Orozco, JASEN - CNP

## 2023-03-14 NOTE — TELEPHONE ENCOUNTER
Ok to proceed from a cardiac perspective and ok to hold eliquis 2 days prior to procedure. Recommend restarting as soon as possible after procedure. Thank you!

## 2023-03-14 NOTE — TELEPHONE ENCOUNTER
Nba Kwon, 1955    Cardiac Risk Assessment    What type of procedure are you having? CYSTO     When is your procedure scheduled for? TBD    Medications to be stopped. ELIQUIS-3 DAYS PRIOR    What physician is performing your procedure?   Megan Ge    Phone Number:   [phone]    Fax number to send the letter:   [phone]    Cardiologist:   JEFFREY/JONATHAN    Last Appointment:   3.9.23-JONATHAN  2.28.23-SHANIKA    Next Appointment:   6.7.23-JEFFREY

## 2023-03-20 ENCOUNTER — TELEPHONE (OUTPATIENT)
Dept: CARDIOLOGY CLINIC | Age: 68
End: 2023-03-20

## 2023-03-20 DIAGNOSIS — I48.19 PERSISTENT ATRIAL FIBRILLATION (HCC): Primary | ICD-10-CM

## 2023-03-20 RX ORDER — DILTIAZEM HYDROCHLORIDE 240 MG/1
240 CAPSULE, COATED, EXTENDED RELEASE ORAL DAILY
Qty: 90 CAPSULE | Refills: 3 | Status: SHIPPED | OUTPATIENT
Start: 2023-03-20

## 2023-03-20 NOTE — TELEPHONE ENCOUNTER
PT'S Wife called back. She stated that the only thing his ins will cover is coumadin. Xarelto is not an option  She stated they can try that and see if they will be able to manage the copay for the blood draws. Pt  uses fake company [employer]  90 day mail in [facility].

## 2023-03-20 NOTE — TELEPHONE ENCOUNTER
Wife called stating PT cannot afford the Eliquis. She did call the company regarding PT asst.  But, does not meet the guidelines. She is wanting to know if he can be put on a ASA regimen? Wife also stated that pt cannot not afford getting blood draws if was put on Coumadin. Stated they are on a very fixed income. Last of 3.8.23  Assessment:   Dizziness, near syncope: due to hypotension/hypovolemia, now improved on decreased meds  Persistent atrial fibrillation: ongoing but stable              - [id number] score 3 (CHF, HTN, age) on eliquis              - diagnosed 2/2023  HFpEF: compensated  HTN: controlled  HLD  NICOLE: compliant with CPAP     Plan:   1. Reviewed afib therapies/interventions (DCCV, ablation, pharmacologic) in detail today with patient and his wife. All questions answered. Because he is feeling better, he would like to defer at this time. 2. Recheck BMP due to prior KAVYA  3. Continue diltiazem 240 mg daily; atenolol stopped due to bradycardia  4. Continue statin, eliquis  5. Losartan stopped due to hypotension  6. Torsemide 5 mg PRN for shortness of breath, edema, weight gain; developed dehydration on scheduled 20 mg daily; currently euvolemic and has not needed prn dose  7. Stay active along with a healthy diet  8. Check BP at home and call the office if consistently out of goal range  9. Follow up in 2 months with Dr. Chepe Morris at [facility] ([facility])     More than 45 minutes of time was spent in direct patient contact during this visit, more than 50% of which was spent educating regarding CHF, medication details, afib and lifestyle modifications.      Lidia Seth, APRN-CNP  [facility] [place]  [phone]

## 2023-03-20 NOTE — TELEPHONE ENCOUNTER
LAST OV 3.8.23    Assessment:   Dizziness, near syncope: due to hypotension/hypovolemia, now improved on decreased meds  Persistent atrial fibrillation: ongoing but stable              - [id number] score 3 (CHF, HTN, age) on eliquis              - diagnosed 2/2023  HFpEF: compensated  HTN: controlled  HLD  NICOLE: compliant with CPAP     Plan:   1. Reviewed afib therapies/interventions (DCCV, ablation, pharmacologic) in detail today with patient and his wife. All questions answered. Because he is feeling better, he would like to defer at this time. 2. Recheck BMP due to prior KAVYA  3. Continue diltiazem 240 mg daily; atenolol stopped due to bradycardia  4. Continue statin, eliquis  5. Losartan stopped due to hypotension  6. Torsemide 5 mg PRN for shortness of breath, edema, weight gain; developed dehydration on scheduled 20 mg daily; currently euvolemic and has not needed prn dose  7. Stay active along with a healthy diet  8. Check BP at home and call the office if consistently out of goal range  9. Follow up in 2 months with Dr. Yudelka Jang at [facility] ([facility])     More than 45 minutes of time was spent in direct patient contact during this visit, more than 50% of which was spent educating regarding CHF, medication details, afib and lifestyle modifications.      JASEN Nelson-CNP  [facility]  [phone]

## 2023-03-21 RX ORDER — ATORVASTATIN CALCIUM 20 MG/1
TABLET, FILM COATED ORAL
Qty: 90 TABLET | Refills: 1 | Status: SHIPPED | OUTPATIENT
Start: 2023-03-21

## 2023-03-23 NOTE — TELEPHONE ENCOUNTER
Central schedule juanis called and stated that pt called in to set up pts schedule with them for the coumadin clinic. She said it is suppose to be with [place]. Do we have a number? Please call pt back.

## 2023-03-23 NOTE — TELEPHONE ENCOUNTER
Spoke to pt's wife Misael Phillips to let her know that once the referral is in chart that the coumadin clinic will contact them to get scheduled and set up and start the education process for her and the pt.  She VU

## 2023-03-28 RX ORDER — WARFARIN SODIUM 5 MG/1
5 TABLET ORAL DAILY
Qty: 90 TABLET | Refills: 1 | Status: SHIPPED | OUTPATIENT
Start: 2023-03-28

## 2023-03-28 NOTE — TELEPHONE ENCOUNTER
Spoke to pts wife regarding coumadin clinic referral.  He has about 9 days of Eliquis remianing. Ordered warfarin 5 mg tablets to mail order pharmacy. Plan to start warfarin on 4/5 and overlap with Eliquis for 2 days, then continue on warfarin alone. Scheduled fist INR check for 4/10 at 1100 at [facility].   Guillaume Ward, PharmD 12:46 PM EDT 3/28/23

## 2023-03-31 DIAGNOSIS — R35.0 FREQUENT URINATION: ICD-10-CM

## 2023-03-31 RX ORDER — TAMSULOSIN HYDROCHLORIDE 0.4 MG/1
CAPSULE ORAL
Qty: 180 CAPSULE | Refills: 1 | Status: SHIPPED | OUTPATIENT
Start: 2023-03-31

## 2023-04-17 ENCOUNTER — ANTI-COAG VISIT (OUTPATIENT)
Dept: PHARMACY | Age: 68
End: 2023-04-17
Payer: MEDICARE

## 2023-04-17 DIAGNOSIS — I48.19 PERSISTENT ATRIAL FIBRILLATION (HCC): Primary | ICD-10-CM

## 2023-04-17 LAB — INTERNATIONAL NORMALIZATION RATIO, POC: 3.1

## 2023-04-17 PROCEDURE — 85610 PROTHROMBIN TIME: CPT | Performed by: PHARMACIST

## 2023-04-17 PROCEDURE — 99211 OFF/OP EST MAY X REQ PHY/QHP: CPT | Performed by: PHARMACIST

## 2023-04-17 NOTE — PROGRESS NOTES
Mr. Andrew Mariee is here for management of anticoagulation for AFib. PMH also significant for CHF, HLD, HTN. He presents today w/out complaint. Pt verifies dosing regimen as listed above. Pt denies s/s bleeding/bruising/swelling/SOB. No BRBPR. No melena. Address missed doses  Reviewed pt medication list  No changes in RX/OTCs/Herbal medications. Reviewed dietary concerns  Address EToH and tobacco use. INR now slightly high today after dose increase last week. Will reduce dose this week. INR 3.1 is slightly above therapeutic range of 2-3. Recommend to decrease dose to 7.5 mg MWF and 5 mg all other days (already took 10 mg this morning). Patient has 5 mg tablets. Will continue to monitor and check INR in 7 days. Dosing reminder card given with phone number, appointment date and time.    Return to clinic: 4/24 @ [address] PharmD 9:24 AM EDT 4/17/23

## 2023-04-19 ENCOUNTER — TELEPHONE (OUTPATIENT)
Dept: PULMONOLOGY | Age: 68
End: 2023-04-19

## 2023-04-19 NOTE — TELEPHONE ENCOUNTER
Patient cancelled appointment on 5/30/23 with Dr. Angelika Stevenson for f/u to discuss CT/PFT. Pt also cx PFt and CT scan    Reason: On fixed income and cannot afford copayments for these appts. Patient did not reschedule appointment. Appointment rescheduled for N/A. Last OV 2/28/23 (New Pt visit)   Assessment:       Abnormal CT chest 2/17/2023 with mosaic attenuation and GGO nodular opacities. Likely obstructive airway disease with superimposed edema/atypical pneumonia. Patient was treated for pneumonia at that time. SOB and cough-likely obstructive airway disease  Childhood asthma   Severe NICOLE on CPAP   Snoring, apnea and hypersomnia without CPAP. Quit smoking 2000, smoked 1.5-2 ppd for 33 years         Plan:       PFTs and 6MW  Trial of Symbicort 160/4.5 2 puffs BID   Continue Albuterol 2 puffs Q4-6 hrs PRN  CT chest 3 months to document resolution of abnormalities  Obtain old sleep studies records   Full night CPAP/BiPAP titration   Continue CPAP 6-8 hrs at night and during naps.

## 2023-04-26 DIAGNOSIS — M19.90 ARTHRITIS: ICD-10-CM

## 2023-04-26 RX ORDER — SULINDAC 200 MG/1
TABLET ORAL
Qty: 90 TABLET | Refills: 1 | Status: SHIPPED | OUTPATIENT
Start: 2023-04-26

## 2023-04-26 NOTE — TELEPHONE ENCOUNTER
Future appt scheduled 05/16/2023                   Last appt 02/23/2023      Last Written 09/08/2022    sulindac (CLINORIL) 200 MG tablet  09/08/2022  #90  1 RF

## 2023-05-01 ENCOUNTER — ANTI-COAG VISIT (OUTPATIENT)
Dept: PHARMACY | Age: 68
End: 2023-05-01
Payer: MEDICARE

## 2023-05-01 DIAGNOSIS — I48.19 PERSISTENT ATRIAL FIBRILLATION (HCC): Primary | ICD-10-CM

## 2023-05-01 LAB — INTERNATIONAL NORMALIZATION RATIO, POC: 4

## 2023-05-01 PROCEDURE — 85610 PROTHROMBIN TIME: CPT | Performed by: PHARMACIST

## 2023-05-01 PROCEDURE — 99211 OFF/OP EST MAY X REQ PHY/QHP: CPT | Performed by: PHARMACIST

## 2023-05-01 NOTE — PROGRESS NOTES
MrRedd Tipton is here for management of anticoagulation for AFib. PMH also significant for CHF, HLD, HTN. He presents today w/out complaint. Pt verifies dosing regimen as listed above. Pt denies s/s bleeding/bruising/swelling/SOB. No BRBPR. No melena. Address missed doses  Reviewed pt medication list  No changes in RX/OTCs/Herbal medications. Reviewed dietary concerns  Address EToH and tobacco use. Planning to start keto diet, will likely increase greens some going forward. Will plan to make small dose reduction today, and allow for increase in Vit K in diet to help lower INR as well    INR 4.0 is above therapeutic range of 2-3. Recommend to hold dose tomorrow, then decrease dose to 7.5 mg M/F and 5 mg all other days (already took 7.5 mg this morning). Patient has 5 mg tablets. Will continue to monitor and check INR in 2 weeks. Dosing reminder card given with phone number, appointment date and time.    Return to clinic: 5/15 @  [facility], PharmD 10:34 AM EDT 5/1/23

## 2023-05-15 ENCOUNTER — ANTI-COAG VISIT (OUTPATIENT)
Dept: PHARMACY | Age: 68
End: 2023-05-15
Payer: MEDICARE

## 2023-05-15 DIAGNOSIS — I48.19 PERSISTENT ATRIAL FIBRILLATION (HCC): Primary | ICD-10-CM

## 2023-05-15 LAB — INTERNATIONAL NORMALIZATION RATIO, POC: 3.5

## 2023-05-15 PROCEDURE — 99211 OFF/OP EST MAY X REQ PHY/QHP: CPT | Performed by: PHARMACIST

## 2023-05-15 PROCEDURE — 85610 PROTHROMBIN TIME: CPT | Performed by: PHARMACIST

## 2023-05-15 NOTE — PROGRESS NOTES
Mr. Nicole Rothman is here for management of anticoagulation for AFib. PMH also significant for CHF, HLD, HTN. He presents today w/out complaint. Pt verifies dosing regimen as listed above. Pt denies s/s bleeding/bruising/swelling/SOB. No BRBPR. No melena. Address missed doses  Reviewed pt medication list  No changes in RX/OTCs/Herbal medications. Reviewed dietary concerns  Address EToH and tobacco use. Planning to start keto diet, will likely increase greens some going forward. They were in [facility] for a week since last visit so diet was different than usual.    INR improved from last visit but still slightly high, will decrease dose again. INR 3.5 is above therapeutic range of 2-3. Recommend to decrease dose to 7.5 mg Mon and 5 mg all other days (already took 7.5 mg this morning). Patient has 5 mg tablets. Will continue to monitor and check INR in 2 weeks. Dosing reminder card given with phone number, appointment date and time.    Return to clinic: 5/31/23 @  0900    Kiersten Huff, PharmD 9:16 AM EDT 5/15/23

## 2023-05-16 RX ORDER — SILDENAFIL 50 MG/1
50 TABLET, FILM COATED ORAL PRN
Qty: 30 TABLET | Refills: 1 | Status: SHIPPED | OUTPATIENT
Start: 2023-05-16 | End: 2023-08-14

## 2023-05-19 ENCOUNTER — TELEPHONE (OUTPATIENT)
Dept: FAMILY MEDICINE CLINIC | Age: 68
End: 2023-05-19

## 2023-05-19 NOTE — TELEPHONE ENCOUNTER
Pt had to cancel his last [facility] visit. I called to set it back up. Spoke with the wife. She wasn't home so she will call us to schedule when she's at home.

## 2023-05-31 ENCOUNTER — ANTI-COAG VISIT (OUTPATIENT)
Dept: PHARMACY | Age: 68
End: 2023-05-31
Payer: MEDICARE

## 2023-05-31 DIAGNOSIS — I48.19 PERSISTENT ATRIAL FIBRILLATION (HCC): Primary | ICD-10-CM

## 2023-05-31 LAB — INTERNATIONAL NORMALIZATION RATIO, POC: 2.7

## 2023-05-31 PROCEDURE — 85610 PROTHROMBIN TIME: CPT | Performed by: PHARMACIST

## 2023-05-31 PROCEDURE — 99211 OFF/OP EST MAY X REQ PHY/QHP: CPT | Performed by: PHARMACIST

## 2023-05-31 NOTE — PROGRESS NOTES
Mr. Daysi Wahl is here for management of anticoagulation for AFib. PMH also significant for CHF, HLD, HTN. He presents today w/out complaint. Pt verifies dosing regimen as listed above. Pt denies s/s bleeding/bruising/swelling/SOB. No BRBPR. No melena. Address missed doses  Reviewed pt medication list  No changes in RX/OTCs/Herbal medications. Reviewed dietary concerns  Address EToH and tobacco use. Will be moving to [facility] next month. Will recheck INR one more time before they leave. INR 2.7 is within therapeutic range of 2-3. Recommend to continue dose of 7.5 mg Mon and 5 mg all other days   Patient has 5 mg tablets. Will continue to monitor and check INR in 2.5 weeks. Dosing reminder card given with phone number, appointment date and time.    Return to clinic: 6/19/23 @  0900    Joshua WilliamD 8:56 AM EDT 5/31/23

## 2023-06-07 ENCOUNTER — HOSPITAL ENCOUNTER (OUTPATIENT)
Age: 68
Discharge: HOME | End: 2023-06-07
Payer: MEDICARE

## 2023-06-07 ENCOUNTER — OFFICE VISIT (OUTPATIENT)
Dept: CARDIOLOGY CLINIC | Age: 68
End: 2023-06-07
Payer: MEDICARE

## 2023-06-07 VITALS
WEIGHT: 315 LBS | DIASTOLIC BLOOD PRESSURE: 72 MMHG | HEART RATE: 77 BPM | SYSTOLIC BLOOD PRESSURE: 122 MMHG | BODY MASS INDEX: 44.1 KG/M2 | HEIGHT: 71 IN | OXYGEN SATURATION: 94 %

## 2023-06-07 DIAGNOSIS — Z79.899 MEDICATION MANAGEMENT: ICD-10-CM

## 2023-06-07 DIAGNOSIS — Z87.891 HISTORY OF TOBACCO ABUSE: ICD-10-CM

## 2023-06-07 DIAGNOSIS — G47.33 OSA (OBSTRUCTIVE SLEEP APNEA): ICD-10-CM

## 2023-06-07 DIAGNOSIS — I45.10 RBBB: ICD-10-CM

## 2023-06-07 DIAGNOSIS — I48.19 PERSISTENT ATRIAL FIBRILLATION (HCC): Primary | ICD-10-CM

## 2023-06-07 DIAGNOSIS — I51.7 CARDIOMEGALY: ICD-10-CM

## 2023-06-07 DIAGNOSIS — I50.31 ACUTE DIASTOLIC CHF (CONGESTIVE HEART FAILURE) (HCC): ICD-10-CM

## 2023-06-07 LAB
ANION GAP SERPL CALCULATED.3IONS-SCNC: 11 MMOL/L (ref 3–16)
BASOPHILS # BLD: 0.1 K/UL (ref 0–0.2)
BASOPHILS NFR BLD: 1 %
BUN SERPL-MCNC: 28 MG/DL (ref 7–20)
CALCIUM SERPL-MCNC: 9 MG/DL (ref 8.3–10.6)
CHLORIDE SERPL-SCNC: 109 MMOL/L (ref 99–110)
CO2 SERPL-SCNC: 25 MMOL/L (ref 21–32)
CREAT SERPL-MCNC: 1.5 MG/DL (ref 0.8–1.3)
DEPRECATED RDW RBC AUTO: 14.8 % (ref 12.4–15.4)
EOSINOPHIL # BLD: 0.3 K/UL (ref 0–0.6)
EOSINOPHIL NFR BLD: 5.2 %
GFR SERPLBLD CREATININE-BSD FMLA CKD-EPI: 50 ML/MIN/{1.73_M2}
GLUCOSE SERPL-MCNC: 69 MG/DL (ref 70–99)
HCT VFR BLD AUTO: 41.6 % (ref 40.5–52.5)
HGB BLD-MCNC: 13.6 G/DL (ref 13.5–17.5)
LYMPHOCYTES # BLD: 1.2 K/UL (ref 1–5.1)
LYMPHOCYTES NFR BLD: 18.6 %
MCH RBC QN AUTO: 29 PG (ref 26–34)
MCHC RBC AUTO-ENTMCNC: 32.7 G/DL (ref 31–36)
MCV RBC AUTO: 88.5 FL (ref 80–100)
MONOCYTES # BLD: 0.7 K/UL (ref 0–1.3)
MONOCYTES NFR BLD: 10.4 %
NEUTROPHILS # BLD: 4.3 K/UL (ref 1.7–7.7)
NEUTROPHILS NFR BLD: 64.8 %
PLATELET # BLD AUTO: 175 K/UL (ref 135–450)
PMV BLD AUTO: 9.6 FL (ref 5–10.5)
POTASSIUM SERPL-SCNC: 4.7 MMOL/L (ref 3.5–5.1)
RBC # BLD AUTO: 4.7 M/UL (ref 4.2–5.9)
SODIUM SERPL-SCNC: 145 MMOL/L (ref 136–145)
WBC # BLD AUTO: 6.7 K/UL (ref 4–11)

## 2023-06-07 PROCEDURE — G8427 DOCREV CUR MEDS BY ELIG CLIN: HCPCS | Performed by: INTERNAL MEDICINE

## 2023-06-07 PROCEDURE — 3078F DIAST BP <80 MM HG: CPT | Performed by: INTERNAL MEDICINE

## 2023-06-07 PROCEDURE — 3074F SYST BP LT 130 MM HG: CPT | Performed by: INTERNAL MEDICINE

## 2023-06-07 PROCEDURE — 1036F TOBACCO NON-USER: CPT | Performed by: INTERNAL MEDICINE

## 2023-06-07 PROCEDURE — 3017F COLORECTAL CA SCREEN DOC REV: CPT | Performed by: INTERNAL MEDICINE

## 2023-06-07 PROCEDURE — 80048 BASIC METABOLIC PNL TOTAL CA: CPT

## 2023-06-07 PROCEDURE — G8417 CALC BMI ABV UP PARAM F/U: HCPCS | Performed by: INTERNAL MEDICINE

## 2023-06-07 PROCEDURE — 36415 COLL VENOUS BLD VENIPUNCTURE: CPT

## 2023-06-07 PROCEDURE — 85025 COMPLETE CBC W/AUTO DIFF WBC: CPT

## 2023-06-07 PROCEDURE — 99214 OFFICE O/P EST MOD 30 MIN: CPT | Performed by: INTERNAL MEDICINE

## 2023-06-07 PROCEDURE — 1123F ACP DISCUSS/DSCN MKR DOCD: CPT | Performed by: INTERNAL MEDICINE

## 2023-06-07 RX ORDER — WARFARIN SODIUM 5 MG/1
5 TABLET ORAL DAILY
Qty: 90 TABLET | Refills: 1 | Status: SHIPPED | OUTPATIENT
Start: 2023-06-07

## 2023-06-07 RX ORDER — ATORVASTATIN CALCIUM 20 MG/1
20 TABLET, FILM COATED ORAL DAILY
Qty: 90 TABLET | Refills: 1 | Status: SHIPPED | OUTPATIENT
Start: 2023-06-07

## 2023-06-07 RX ORDER — DILTIAZEM HYDROCHLORIDE 360 MG/1
360 CAPSULE, EXTENDED RELEASE ORAL DAILY
Qty: 90 CAPSULE | Refills: 1 | Status: SHIPPED | OUTPATIENT
Start: 2023-06-07

## 2023-06-07 NOTE — PROGRESS NOTES
11\" (1.803 m)      Constitutional and General Appearance: NAD   Respiratory:  Normal excursion and expansion without use of accessory muscles  Resp Auscultation: Clear, no crackles or wheezes   Cardiovascular: The apical impulses not displaced  Heart tones are crisp and normal  Cervical veins are not engorged  The carotid upstroke is normal in amplitude and contour without delay or bruit  Normal S1S2, No S3, Soft KATARINA, Irregularly irregular  Peripheral pulses are symmetrical and full  There is no clubbing, cyanosis of the extremities. No edema  Femoral Arteries: 2+ and equal  Pedal Pulses: 2+ and equal   Abdomen:  No masses or tenderness  Liver/Spleen: No Abnormalities Noted  Neurological/Psychiatric:  Alert and oriented in all spheres  Moves all extremities well  Exhibits normal gait balance and coordination  No abnormalities of mood, affect, memory, mentation, or behavior are noted  Skin:  Skin: warm and dry. Lab Results   Component Value Date    CHOL 193 05/06/2019    CHOL 165 12/15/2016    CHOL 151 03/08/2016     Lab Results   Component Value Date    TRIG 106 05/06/2019    TRIG 86 12/15/2016    TRIG 61 03/08/2016     Lab Results   Component Value Date    HDL 41 12/14/2022    HDL 51 12/07/2021    HDL 45 03/10/2020     Lab Results   Component Value Date    LDLCALC 66 12/14/2022    LDLCALC 56 12/07/2021    LDLCALC 47 03/10/2020     Lab Results   Component Value Date    LABVLDL 15 12/14/2022    LABVLDL 22 12/07/2021    LABVLDL 15 03/10/2020     No results found for: CHOLHDLRATIO     Assessment:     1. Persistent atrial fibrillation ([address]): Diagnosed 2/23 and unknown duration. No significant symptoms now and overall doing well. Mainstay of med mgt will be HR control and AC. He may switch to eliquis in [place] but too expensive right now. 2. RBBB: Found on EKG 2/23. Follow clinically only. 3. HTN: Suboptimal blood pressure control recently and will increase cardizem to 360mg daily.  Told him that if he

## 2023-06-07 NOTE — PATIENT INSTRUCTIONS
Plan:  Current medications reviewed. Refills given as warranted. Goal bp is less than 130/80  Recommend getting set up with cardiologist or coumadin clinic in [place]  -talk to your friends in [place] about who they use. -See Olivia Sandoval here just before you move which will give you a little time to get set up with someone. Increase Cardizem to 360 mg once daily. You will not be able to cut your current pills in half because they are capsules.    -if you notice that you get weaker are more dizzy. Your bp or heart rate may be too low. You can decrease back to 240 mg daily   No cardiac testing at this time. As long as you feel good while you are in atrial fibrillation then we only treat you with medications. If you begin feeling bad then there are procedures we can do to help you feel better. Repeat CBC and BMP blood work. You will not need to be fasting for this blood work.       Follow up with me as needed in the future

## 2023-06-08 ENCOUNTER — TELEPHONE (OUTPATIENT)
Dept: CARDIOLOGY CLINIC | Age: 68
End: 2023-06-08

## 2023-06-08 DIAGNOSIS — Z79.899 MEDICATION MANAGEMENT: Primary | ICD-10-CM

## 2023-06-08 NOTE — TELEPHONE ENCOUNTER
----- Message from Lawanda Mayfield MD sent at 6/8/2023  7:32 AM EDT -----  Kidney function mildly worsened. Would suggest staying hydrated to help flush kidneys and when he gets to [place] would have basic metabolic panel lab repeated to recheck. I do not see any ACE-I or ARB meds on list and please verify. Also make sure not taking OTC NSAIDS either.

## 2023-06-08 NOTE — TELEPHONE ENCOUNTER
Sulindac is a NSAID and needs to stop or minimize as much as possible as this may be contributing to his kidney dysfunction.

## 2023-06-09 NOTE — TELEPHONE ENCOUNTER
Eduar Vila MD  [facility] Staff 17 hours ago (2:59 PM)     SM  Sulindac is a NSAID and needs to stop or minimize as much as possible as this may be contributing to his kidney dysfunction. Note         Patito Atul Zhong routed conversation to Eduar Vila MD 18 hours ago (2:40 PM)     Gavi Obregon 18 hours ago (2:40 PM)     AM  Manuel Garrido and wife Aidee Humphrey returned call. Pt takes: gabapentin, tamsulosin, duloxetine, atorvastatin, sulindac, diltiazem, warfarin, and torsemide at home. Pt and wife both v/u regarding lab results. Pt stated he is not taking any NSAIDS at home. Repeat BMP order placed.           Note

## 2023-06-28 DIAGNOSIS — G57.93 NEUROPATHY INVOLVING BOTH LOWER EXTREMITIES: ICD-10-CM

## 2023-06-30 RX ORDER — DULOXETIN HYDROCHLORIDE 60 MG/1
CAPSULE, DELAYED RELEASE ORAL
Qty: 90 CAPSULE | Refills: 0 | Status: SHIPPED | OUTPATIENT
Start: 2023-06-30

## 2023-06-30 RX ORDER — GABAPENTIN 300 MG/1
CAPSULE ORAL
Qty: 540 CAPSULE | Refills: 0 | Status: SHIPPED | OUTPATIENT
Start: 2023-06-30 | End: 2023-09-30

## 2023-07-03 ENCOUNTER — TELEPHONE (OUTPATIENT)
Dept: CARDIOLOGY CLINIC | Age: 68
End: 2023-07-03

## 2023-07-03 NOTE — TELEPHONE ENCOUNTER
Pt's wife called I with some questions regarding the pt's warfarin (COUMADIN) 5 MG tablet. Pt's wife stated that they are moving and can't get into new cardiologist until 7/31/23.

## 2023-07-05 NOTE — TELEPHONE ENCOUNTER
He had it done in hospital in [place] last Thursday and it was 2.0.  when should he have this rechecked? ?

## 2023-07-05 NOTE — TELEPHONE ENCOUNTER
INR 7/29/2023 2.0  He's on 5 mg Tuesday-Sunday and 7.5 mg on Monday. Should he continue this dose and when to recheck. He will be established with [facility] in [place] 7/30/2023    She may need order faxed to [facility] in [place].   Wife is getting fax number,    SMM OOT

## 2023-12-07 ENCOUNTER — ANTI-COAG VISIT (OUTPATIENT)
Dept: PHARMACY | Age: 68
End: 2023-12-07

## 2023-12-07 DIAGNOSIS — I48.19 PERSISTENT ATRIAL FIBRILLATION (HCC): Primary | ICD-10-CM

## 2023-12-07 NOTE — PROGRESS NOTES
Patient moved to [place], will resolve coumadin clinic episode.   Emerita Scott, PharmD 3:11 PM EST 12/7/23

## 2024-12-28 SDOH — ECONOMIC STABILITY: INCOME INSECURITY: HOW HARD IS IT FOR YOU TO PAY FOR THE VERY BASICS LIKE FOOD, HOUSING, MEDICAL CARE, AND HEATING?: NOT VERY HARD

## 2024-12-28 SDOH — ECONOMIC STABILITY: TRANSPORTATION INSECURITY
IN THE PAST 12 MONTHS, HAS LACK OF TRANSPORTATION KEPT YOU FROM MEETINGS, WORK, OR FROM GETTING THINGS NEEDED FOR DAILY LIVING?: NO

## 2024-12-28 SDOH — ECONOMIC STABILITY: FOOD INSECURITY: WITHIN THE PAST 12 MONTHS, THE FOOD YOU BOUGHT JUST DIDN'T LAST AND YOU DIDN'T HAVE MONEY TO GET MORE.: NEVER TRUE

## 2024-12-28 SDOH — ECONOMIC STABILITY: FOOD INSECURITY: WITHIN THE PAST 12 MONTHS, YOU WORRIED THAT YOUR FOOD WOULD RUN OUT BEFORE YOU GOT MONEY TO BUY MORE.: NEVER TRUE

## 2024-12-28 ASSESSMENT — PATIENT HEALTH QUESTIONNAIRE - PHQ9
SUM OF ALL RESPONSES TO PHQ9 QUESTIONS 1 & 2: 0
SUM OF ALL RESPONSES TO PHQ QUESTIONS 1-9: 0
SUM OF ALL RESPONSES TO PHQ9 QUESTIONS 1 & 2: 0
2. FEELING DOWN, DEPRESSED OR HOPELESS: NOT AT ALL
SUM OF ALL RESPONSES TO PHQ QUESTIONS 1-9: 0
SUM OF ALL RESPONSES TO PHQ QUESTIONS 1-9: 0
2. FEELING DOWN, DEPRESSED OR HOPELESS: NOT AT ALL
SUM OF ALL RESPONSES TO PHQ QUESTIONS 1-9: 0
1. LITTLE INTEREST OR PLEASURE IN DOING THINGS: NOT AT ALL
1. LITTLE INTEREST OR PLEASURE IN DOING THINGS: NOT AT ALL

## 2025-01-02 ENCOUNTER — TELEPHONE (OUTPATIENT)
Dept: FAMILY MEDICINE CLINIC | Age: 70
End: 2025-01-02

## 2025-01-02 NOTE — TELEPHONE ENCOUNTER
Patient is requesting refill of requip 4 mg twice daily   This was prescribed by doctor in [place]   Send to [facility]   Future appt 1/14    Last appt 2/23/23

## 2025-01-13 PROBLEM — I50.9 ACUTE CONGESTIVE HEART FAILURE (HCC): Status: RESOLVED | Noted: 2023-02-17 | Resolved: 2025-01-13

## 2025-01-13 NOTE — PROGRESS NOTES
CHIEF COMPLAINT  Chief Complaint   Patient presents with    Check-Up        HPI   Nick Hawkins is a 69 y.o. male who presents to the office accompanied by his wife to reestablish care.  Patient was previously a patient however they did move to [place] for the past 2 years.  Patient reports moving back approximately 6 weeks ago.  Patient denies any episodes of dizziness, lightheadedness, chest pain or shortness of breath.  Patient reports that he does have episodes of indigestion but contributes it to what he eats.  Patient is noncompliant with diet and exercise.  Patient denies any abdominal pain, nausea, vomiting or diarrhea.  No dark or tarry stools.  Patient denies any changes in bowel or bladder function.  Patient reports ongoing left lower extremity edema.  Patient reports taking medications as directed.  Patient reports that he was established with a cardiologist in [place] but has not reestablished with previous provider since moving back.  No other complaints, modifying factors or associated symptoms.     Nursing notes reviewed.   Past Medical History:   Diagnosis Date    A-fib (HCC)     Acute congestive heart failure (HCC) 02/17/2023    Anxiety     Asthma     Atrial fibrillation (HCC)     BPH     prostate 5/05,     CHF (congestive heart failure) (HCC)     Dermatitis due to sun     severe    Hearing loss     decreased hearing left ear    Hyperlipidemia     Hypertension     pos GXT 3/01, neg GXT 3/07    Narcolepsy     Osteoarthritis     Pneumonia     Sleep apnea     bipap    Superficial peroneal nerve neuropathy     bilateral    Urinary incontinence 01/01/2024     Past Surgical History:   Procedure Laterality Date    APPENDECTOMY      CHOLECYSTECTOMY      COLONOSCOPY  12/19/2011    KNEE SURGERY Right 04/22/2016    Rt total knee    OTHER SURGICAL HISTORY Right 09/01/2016    RIGHT TOTAL SHOULDER REPLACEMENT          SHOULDER ARTHROPLASTY  06/19/2012    Lt total shoulder arthroplasty    TOTAL KNEE

## 2025-01-14 ENCOUNTER — HOSPITAL ENCOUNTER (INPATIENT)
Age: 70
LOS: 2 days | Discharge: HOME | End: 2025-01-16
Attending: INTERNAL MEDICINE | Admitting: INTERNAL MEDICINE
Payer: MEDICARE

## 2025-01-14 ENCOUNTER — TELEPHONE (OUTPATIENT)
Dept: FAMILY MEDICINE CLINIC | Age: 70
End: 2025-01-14

## 2025-01-14 ENCOUNTER — HOSPITAL ENCOUNTER (EMERGENCY)
Age: 70
Discharge: ANOTHER HOSPITAL | End: 2025-01-14
Attending: EMERGENCY MEDICINE
Payer: MEDICARE

## 2025-01-14 ENCOUNTER — APPOINTMENT (OUTPATIENT)
Dept: GENERAL RADIOLOGY | Age: 70
End: 2025-01-14
Payer: MEDICARE

## 2025-01-14 ENCOUNTER — OFFICE VISIT (OUTPATIENT)
Dept: FAMILY MEDICINE CLINIC | Age: 70
End: 2025-01-14
Payer: MEDICARE

## 2025-01-14 VITALS
DIASTOLIC BLOOD PRESSURE: 76 MMHG | BODY MASS INDEX: 44.1 KG/M2 | SYSTOLIC BLOOD PRESSURE: 135 MMHG | WEIGHT: 315 LBS | HEIGHT: 71 IN

## 2025-01-14 VITALS
DIASTOLIC BLOOD PRESSURE: 101 MMHG | OXYGEN SATURATION: 93 % | RESPIRATION RATE: 18 BRPM | WEIGHT: 315 LBS | TEMPERATURE: 98.3 F | HEART RATE: 95 BPM | BODY MASS INDEX: 44.1 KG/M2 | HEIGHT: 71 IN | SYSTOLIC BLOOD PRESSURE: 153 MMHG

## 2025-01-14 DIAGNOSIS — M79.89 LEG SWELLING: ICD-10-CM

## 2025-01-14 DIAGNOSIS — R35.0 FREQUENT URINATION: ICD-10-CM

## 2025-01-14 DIAGNOSIS — I21.11 ST ELEVATION MYOCARDIAL INFARCTION INVOLVING RIGHT CORONARY ARTERY (HCC): Primary | ICD-10-CM

## 2025-01-14 DIAGNOSIS — N18.9 CHRONIC KIDNEY DISEASE, UNSPECIFIED CKD STAGE: ICD-10-CM

## 2025-01-14 DIAGNOSIS — I48.19 PERSISTENT ATRIAL FIBRILLATION (HCC): Primary | ICD-10-CM

## 2025-01-14 DIAGNOSIS — R06.02 SHORTNESS OF BREATH: ICD-10-CM

## 2025-01-14 DIAGNOSIS — G57.93 NEUROPATHY INVOLVING BOTH LOWER EXTREMITIES: ICD-10-CM

## 2025-01-14 DIAGNOSIS — E78.00 HYPERCHOLESTEREMIA: ICD-10-CM

## 2025-01-14 DIAGNOSIS — Z79.01 CHRONIC ANTICOAGULATION: ICD-10-CM

## 2025-01-14 DIAGNOSIS — Z12.5 ENCOUNTER FOR SCREENING PROSTATE SPECIFIC ANTIGEN (PSA) MEASUREMENT: ICD-10-CM

## 2025-01-14 DIAGNOSIS — R07.9 CHEST PAIN: ICD-10-CM

## 2025-01-14 DIAGNOSIS — I10 ESSENTIAL HYPERTENSION: ICD-10-CM

## 2025-01-14 DIAGNOSIS — F41.9 ANXIETY: ICD-10-CM

## 2025-01-14 DIAGNOSIS — I50.9 CHRONIC CONGESTIVE HEART FAILURE, UNSPECIFIED HEART FAILURE TYPE (HCC): ICD-10-CM

## 2025-01-14 DIAGNOSIS — I50.32 CHRONIC HEART FAILURE WITH PRESERVED EJECTION FRACTION (HFPEF) (HCC): ICD-10-CM

## 2025-01-14 DIAGNOSIS — R73.03 PREDIABETES: ICD-10-CM

## 2025-01-14 DIAGNOSIS — M19.90 ARTHRITIS: ICD-10-CM

## 2025-01-14 DIAGNOSIS — R07.9 CHEST PAIN, UNSPECIFIED TYPE: ICD-10-CM

## 2025-01-14 DIAGNOSIS — E55.9 VITAMIN D DEFICIENCY: ICD-10-CM

## 2025-01-14 DIAGNOSIS — G47.33 OSA (OBSTRUCTIVE SLEEP APNEA): ICD-10-CM

## 2025-01-14 DIAGNOSIS — I48.0 PAROXYSMAL ATRIAL FIBRILLATION (HCC): Primary | ICD-10-CM

## 2025-01-14 DIAGNOSIS — R91.8 PULMONARY NODULES: ICD-10-CM

## 2025-01-14 PROBLEM — R94.31 ABNORMAL EKG: Status: ACTIVE | Noted: 2025-01-14

## 2025-01-14 PROBLEM — I21.02 STEMI INVOLVING LEFT ANTERIOR DESCENDING CORONARY ARTERY (HCC): Status: ACTIVE | Noted: 2025-01-14

## 2025-01-14 PROBLEM — Z98.890 STATUS POST CARDIAC CATHETERIZATION: Status: ACTIVE | Noted: 2025-01-14

## 2025-01-14 LAB
ALBUMIN SERPL-MCNC: 4.3 G/DL (ref 3.4–5)
ALBUMIN SERPL-MCNC: 4.3 G/DL (ref 3.4–5)
ALBUMIN/GLOB SERPL: 1.3 {RATIO} (ref 1.1–2.2)
ALBUMIN/GLOB SERPL: 1.7 {RATIO} (ref 1.1–2.2)
ALP SERPL-CCNC: 94 U/L (ref 40–129)
ALP SERPL-CCNC: 98 U/L (ref 40–129)
ALT SERPL-CCNC: 10 U/L (ref 10–40)
ALT SERPL-CCNC: 22 U/L (ref 10–40)
ANION GAP SERPL CALCULATED.3IONS-SCNC: 10 MMOL/L (ref 3–16)
ANION GAP SERPL CALCULATED.3IONS-SCNC: 12 MMOL/L (ref 3–16)
APTT BLD: 34.7 SEC (ref 22.1–36.4)
AST SERPL-CCNC: 19 U/L (ref 15–37)
AST SERPL-CCNC: 70 U/L (ref 15–37)
BASOPHILS # BLD: 0.1 K/UL (ref 0–0.2)
BASOPHILS # BLD: 0.1 K/UL (ref 0–0.2)
BASOPHILS NFR BLD: 1.1 %
BASOPHILS NFR BLD: 1.3 %
BILIRUB SERPL-MCNC: 0.5 MG/DL (ref 0–1)
BILIRUB SERPL-MCNC: 0.7 MG/DL (ref 0–1)
BUN SERPL-MCNC: 18 MG/DL (ref 7–20)
BUN SERPL-MCNC: 18 MG/DL (ref 7–20)
CALCIUM SERPL-MCNC: 10.1 MG/DL (ref 8.3–10.6)
CALCIUM SERPL-MCNC: 10.3 MG/DL (ref 8.3–10.6)
CHLORIDE SERPL-SCNC: 105 MMOL/L (ref 99–110)
CHLORIDE SERPL-SCNC: 91 MMOL/L (ref 99–110)
CHOLEST SERPL-MCNC: 149 MG/DL (ref 0–199)
CO2 SERPL-SCNC: 26 MMOL/L (ref 21–32)
CO2 SERPL-SCNC: 28 MMOL/L (ref 21–32)
CREAT SERPL-MCNC: 0.9 MG/DL (ref 0.8–1.3)
CREAT SERPL-MCNC: 1 MG/DL (ref 0.8–1.3)
DEPRECATED RDW RBC AUTO: 15.1 % (ref 12.4–15.4)
DEPRECATED RDW RBC AUTO: 15.6 % (ref 12.4–15.4)
ECHO BSA: 2.76 M2
EKG ATRIAL RATE: 62 BPM
EKG DIAGNOSIS: NORMAL
EKG P AXIS: 59 DEGREES
EKG P-R INTERVAL: 198 MS
EKG Q-T INTERVAL: 424 MS
EKG QRS DURATION: 138 MS
EKG QTC CALCULATION (BAZETT): 430 MS
EKG R AXIS: 1 DEGREES
EKG T AXIS: 50 DEGREES
EKG VENTRICULAR RATE: 62 BPM
EOSINOPHIL # BLD: 0.2 K/UL (ref 0–0.6)
EOSINOPHIL # BLD: 0.2 K/UL (ref 0–0.6)
EOSINOPHIL NFR BLD: 1.9 %
EOSINOPHIL NFR BLD: 2.1 %
GFR SERPLBLD CREATININE-BSD FMLA CKD-EPI: 80 ML/MIN/{1.73_M2}
GFR SERPLBLD CREATININE-BSD FMLA CKD-EPI: >90 ML/MIN/{1.73_M2}
GLUCOSE SERPL-MCNC: 91 MG/DL (ref 70–99)
GLUCOSE SERPL-MCNC: 94 MG/DL (ref 70–99)
HCT VFR BLD AUTO: 43.1 % (ref 40.5–52.5)
HCT VFR BLD AUTO: 44 % (ref 40.5–52.5)
HDLC SERPL-MCNC: 66 MG/DL (ref 40–60)
HGB BLD-MCNC: 14.3 G/DL (ref 13.5–17.5)
HGB BLD-MCNC: 14.3 G/DL (ref 13.5–17.5)
INR PPP: 1.58 (ref 0.85–1.15)
INR PPP: 1.72 (ref 0.85–1.15)
LDL CHOLESTEROL: 65 MG/DL
LYMPHOCYTES # BLD: 1 K/UL (ref 1–5.1)
LYMPHOCYTES # BLD: 1.1 K/UL (ref 1–5.1)
LYMPHOCYTES NFR BLD: 12.4 %
LYMPHOCYTES NFR BLD: 13.1 %
MCH RBC QN AUTO: 28.2 PG (ref 26–34)
MCH RBC QN AUTO: 28.8 PG (ref 26–34)
MCHC RBC AUTO-ENTMCNC: 32.5 G/DL (ref 31–36)
MCHC RBC AUTO-ENTMCNC: 33.2 G/DL (ref 31–36)
MCV RBC AUTO: 86.6 FL (ref 80–100)
MCV RBC AUTO: 86.7 FL (ref 80–100)
MONOCYTES # BLD: 0.7 K/UL (ref 0–1.3)
MONOCYTES # BLD: 0.8 K/UL (ref 0–1.3)
MONOCYTES NFR BLD: 8.4 %
MONOCYTES NFR BLD: 9.3 %
NEUTROPHILS # BLD: 6 K/UL (ref 1.7–7.7)
NEUTROPHILS # BLD: 6.2 K/UL (ref 1.7–7.7)
NEUTROPHILS NFR BLD: 74.2 %
NEUTROPHILS NFR BLD: 76.2 %
NT-PROBNP SERPL-MCNC: 400 PG/ML (ref 0–124)
PLATELET # BLD AUTO: 152 K/UL (ref 135–450)
PLATELET # BLD AUTO: 154 K/UL (ref 135–450)
PMV BLD AUTO: 8.7 FL (ref 5–10.5)
PMV BLD AUTO: 9.8 FL (ref 5–10.5)
POTASSIUM SERPL-SCNC: 4.2 MMOL/L (ref 3.5–5.1)
POTASSIUM SERPL-SCNC: 4.4 MMOL/L (ref 3.5–5.1)
PROT SERPL-MCNC: 6.9 G/DL (ref 6.4–8.2)
PROT SERPL-MCNC: 7.6 G/DL (ref 6.4–8.2)
PROTHROMBIN TIME: 19 SEC (ref 11.9–14.9)
PROTHROMBIN TIME: 20.3 SEC (ref 11.9–14.9)
PSA SERPL DL<=0.01 NG/ML-MCNC: 2.05 NG/ML (ref 0–4)
RBC # BLD AUTO: 4.98 M/UL (ref 4.2–5.9)
RBC # BLD AUTO: 5.07 M/UL (ref 4.2–5.9)
SODIUM SERPL-SCNC: 131 MMOL/L (ref 136–145)
SODIUM SERPL-SCNC: 141 MMOL/L (ref 136–145)
TRIGL SERPL-MCNC: 89 MG/DL (ref 0–150)
TROPONIN, HIGH SENSITIVITY: 26 NG/L (ref 0–22)
VLDLC SERPL CALC-MCNC: 18 MG/DL
WBC # BLD AUTO: 8.1 K/UL (ref 4–11)
WBC # BLD AUTO: 8.1 K/UL (ref 4–11)

## 2025-01-14 PROCEDURE — 2500000003 HC RX 250 WO HCPCS: Performed by: INTERNAL MEDICINE

## 2025-01-14 PROCEDURE — 6370000000 HC RX 637 (ALT 250 FOR IP): Performed by: INTERNAL MEDICINE

## 2025-01-14 PROCEDURE — 84484 ASSAY OF TROPONIN QUANT: CPT

## 2025-01-14 PROCEDURE — B2151ZZ FLUOROSCOPY OF LEFT HEART USING LOW OSMOLAR CONTRAST: ICD-10-PCS | Performed by: INTERNAL MEDICINE

## 2025-01-14 PROCEDURE — G8417 CALC BMI ABV UP PARAM F/U: HCPCS | Performed by: NURSE PRACTITIONER

## 2025-01-14 PROCEDURE — 71045 X-RAY EXAM CHEST 1 VIEW: CPT

## 2025-01-14 PROCEDURE — 99291 CRITICAL CARE FIRST HOUR: CPT

## 2025-01-14 PROCEDURE — 93458 L HRT ARTERY/VENTRICLE ANGIO: CPT | Performed by: INTERNAL MEDICINE

## 2025-01-14 PROCEDURE — 1036F TOBACCO NON-USER: CPT | Performed by: NURSE PRACTITIONER

## 2025-01-14 PROCEDURE — G8427 DOCREV CUR MEDS BY ELIG CLIN: HCPCS | Performed by: NURSE PRACTITIONER

## 2025-01-14 PROCEDURE — C1769 GUIDE WIRE: HCPCS | Performed by: INTERNAL MEDICINE

## 2025-01-14 PROCEDURE — C1887 CATHETER, GUIDING: HCPCS | Performed by: INTERNAL MEDICINE

## 2025-01-14 PROCEDURE — 4A023N7 MEASUREMENT OF CARDIAC SAMPLING AND PRESSURE, LEFT HEART, PERCUTANEOUS APPROACH: ICD-10-PCS | Performed by: INTERNAL MEDICINE

## 2025-01-14 PROCEDURE — 1123F ACP DISCUSS/DSCN MKR DOCD: CPT | Performed by: NURSE PRACTITIONER

## 2025-01-14 PROCEDURE — 99152 MOD SED SAME PHYS/QHP 5/>YRS: CPT | Performed by: INTERNAL MEDICINE

## 2025-01-14 PROCEDURE — 80053 COMPREHEN METABOLIC PANEL: CPT

## 2025-01-14 PROCEDURE — 6360000002 HC RX W HCPCS: Performed by: INTERNAL MEDICINE

## 2025-01-14 PROCEDURE — 3078F DIAST BP <80 MM HG: CPT | Performed by: NURSE PRACTITIONER

## 2025-01-14 PROCEDURE — 93005 ELECTROCARDIOGRAM TRACING: CPT | Performed by: EMERGENCY MEDICINE

## 2025-01-14 PROCEDURE — 6360000002 HC RX W HCPCS: Performed by: EMERGENCY MEDICINE

## 2025-01-14 PROCEDURE — 7100000010 HC PHASE II RECOVERY - FIRST 15 MIN: Performed by: INTERNAL MEDICINE

## 2025-01-14 PROCEDURE — 99153 MOD SED SAME PHYS/QHP EA: CPT | Performed by: INTERNAL MEDICINE

## 2025-01-14 PROCEDURE — C1894 INTRO/SHEATH, NON-LASER: HCPCS | Performed by: INTERNAL MEDICINE

## 2025-01-14 PROCEDURE — 6370000000 HC RX 637 (ALT 250 FOR IP)

## 2025-01-14 PROCEDURE — 83880 ASSAY OF NATRIURETIC PEPTIDE: CPT

## 2025-01-14 PROCEDURE — 85610 PROTHROMBIN TIME: CPT

## 2025-01-14 PROCEDURE — 2580000003 HC RX 258: Performed by: INTERNAL MEDICINE

## 2025-01-14 PROCEDURE — 3017F COLORECTAL CA SCREEN DOC REV: CPT | Performed by: NURSE PRACTITIONER

## 2025-01-14 PROCEDURE — 99223 1ST HOSP IP/OBS HIGH 75: CPT | Performed by: INTERNAL MEDICINE

## 2025-01-14 PROCEDURE — 36415 COLL VENOUS BLD VENIPUNCTURE: CPT

## 2025-01-14 PROCEDURE — 1159F MED LIST DOCD IN RCRD: CPT | Performed by: NURSE PRACTITIONER

## 2025-01-14 PROCEDURE — 93010 ELECTROCARDIOGRAM REPORT: CPT | Performed by: INTERNAL MEDICINE

## 2025-01-14 PROCEDURE — 85730 THROMBOPLASTIN TIME PARTIAL: CPT

## 2025-01-14 PROCEDURE — 6370000000 HC RX 637 (ALT 250 FOR IP): Performed by: EMERGENCY MEDICINE

## 2025-01-14 PROCEDURE — G2211 COMPLEX E/M VISIT ADD ON: HCPCS | Performed by: NURSE PRACTITIONER

## 2025-01-14 PROCEDURE — 2709999900 HC NON-CHARGEABLE SUPPLY: Performed by: INTERNAL MEDICINE

## 2025-01-14 PROCEDURE — 2060000000 HC ICU INTERMEDIATE R&B

## 2025-01-14 PROCEDURE — 3075F SYST BP GE 130 - 139MM HG: CPT | Performed by: NURSE PRACTITIONER

## 2025-01-14 PROCEDURE — 99214 OFFICE O/P EST MOD 30 MIN: CPT | Performed by: NURSE PRACTITIONER

## 2025-01-14 PROCEDURE — 96365 THER/PROPH/DIAG IV INF INIT: CPT

## 2025-01-14 PROCEDURE — 6360000004 HC RX CONTRAST MEDICATION: Performed by: INTERNAL MEDICINE

## 2025-01-14 PROCEDURE — B2111ZZ FLUOROSCOPY OF MULTIPLE CORONARY ARTERIES USING LOW OSMOLAR CONTRAST: ICD-10-PCS | Performed by: INTERNAL MEDICINE

## 2025-01-14 PROCEDURE — 85025 COMPLETE CBC W/AUTO DIFF WBC: CPT

## 2025-01-14 PROCEDURE — 7100000011 HC PHASE II RECOVERY - ADDTL 15 MIN: Performed by: INTERNAL MEDICINE

## 2025-01-14 RX ORDER — IPRATROPIUM BROMIDE AND ALBUTEROL SULFATE 2.5; .5 MG/3ML; MG/3ML
1 SOLUTION RESPIRATORY (INHALATION) EVERY 4 HOURS PRN
Status: DISCONTINUED | OUTPATIENT
Start: 2025-01-14 | End: 2025-01-16 | Stop reason: HOSPADM

## 2025-01-14 RX ORDER — SODIUM CHLORIDE 9 MG/ML
INJECTION, SOLUTION INTRAVENOUS PRN
Status: DISCONTINUED | OUTPATIENT
Start: 2025-01-14 | End: 2025-01-16 | Stop reason: HOSPADM

## 2025-01-14 RX ORDER — ROPINIROLE 2 MG/1
2 TABLET, FILM COATED ORAL 4 TIMES DAILY
COMMUNITY

## 2025-01-14 RX ORDER — ACETAMINOPHEN 650 MG/1
650 SUPPOSITORY RECTAL EVERY 6 HOURS PRN
Status: DISCONTINUED | OUTPATIENT
Start: 2025-01-14 | End: 2025-01-16 | Stop reason: HOSPADM

## 2025-01-14 RX ORDER — HYDROCODONE BITARTRATE AND ACETAMINOPHEN 5; 325 MG/1; MG/1
2 TABLET ORAL EVERY 6 HOURS PRN
Status: ON HOLD | COMMUNITY
End: 2025-01-16 | Stop reason: HOSPADM

## 2025-01-14 RX ORDER — HYDROCODONE BITARTRATE AND ACETAMINOPHEN 5; 325 MG/1; MG/1
2 TABLET ORAL EVERY 6 HOURS PRN
Status: DISCONTINUED | OUTPATIENT
Start: 2025-01-14 | End: 2025-01-16 | Stop reason: HOSPADM

## 2025-01-14 RX ORDER — TAMSULOSIN HYDROCHLORIDE 0.4 MG/1
0.4 CAPSULE ORAL 2 TIMES DAILY
Qty: 180 CAPSULE | Refills: 1 | Status: SHIPPED | OUTPATIENT
Start: 2025-01-14

## 2025-01-14 RX ORDER — SODIUM CHLORIDE 0.9 % (FLUSH) 0.9 %
5-40 SYRINGE (ML) INJECTION PRN
Status: DISCONTINUED | OUTPATIENT
Start: 2025-01-14 | End: 2025-01-16 | Stop reason: HOSPADM

## 2025-01-14 RX ORDER — ASPIRIN 81 MG/1
324 TABLET, CHEWABLE ORAL ONCE
Status: COMPLETED | OUTPATIENT
Start: 2025-01-14 | End: 2025-01-14

## 2025-01-14 RX ORDER — TORSEMIDE 10 MG/1
10 TABLET ORAL DAILY
Status: ON HOLD | COMMUNITY
End: 2025-01-16 | Stop reason: HOSPADM

## 2025-01-14 RX ORDER — SODIUM CHLORIDE 0.9 % (FLUSH) 0.9 %
5-40 SYRINGE (ML) INJECTION EVERY 12 HOURS SCHEDULED
Status: DISCONTINUED | OUTPATIENT
Start: 2025-01-14 | End: 2025-01-16 | Stop reason: HOSPADM

## 2025-01-14 RX ORDER — HEPARIN SODIUM 10000 [USP'U]/100ML
6.6 INJECTION, SOLUTION INTRAVENOUS CONTINUOUS
Status: DISCONTINUED | OUTPATIENT
Start: 2025-01-14 | End: 2025-01-14 | Stop reason: HOSPADM

## 2025-01-14 RX ORDER — HEPARIN SODIUM 1000 [USP'U]/ML
2000 INJECTION, SOLUTION INTRAVENOUS; SUBCUTANEOUS PRN
Status: DISCONTINUED | OUTPATIENT
Start: 2025-01-14 | End: 2025-01-14 | Stop reason: HOSPADM

## 2025-01-14 RX ORDER — ACETAMINOPHEN 325 MG/1
650 TABLET ORAL EVERY 6 HOURS PRN
Status: DISCONTINUED | OUTPATIENT
Start: 2025-01-14 | End: 2025-01-16 | Stop reason: HOSPADM

## 2025-01-14 RX ORDER — MIDAZOLAM 1 MG/ML
INJECTION INTRAMUSCULAR; INTRAVENOUS PRN
Status: DISCONTINUED | OUTPATIENT
Start: 2025-01-14 | End: 2025-01-14 | Stop reason: HOSPADM

## 2025-01-14 RX ORDER — TAMSULOSIN HYDROCHLORIDE 0.4 MG/1
0.4 CAPSULE ORAL 2 TIMES DAILY
Status: DISCONTINUED | OUTPATIENT
Start: 2025-01-14 | End: 2025-01-16 | Stop reason: HOSPADM

## 2025-01-14 RX ORDER — POLYETHYLENE GLYCOL 3350 17 G/17G
17 POWDER, FOR SOLUTION ORAL DAILY PRN
Status: DISCONTINUED | OUTPATIENT
Start: 2025-01-14 | End: 2025-01-16 | Stop reason: HOSPADM

## 2025-01-14 RX ORDER — HEPARIN SODIUM 1000 [USP'U]/ML
4000 INJECTION, SOLUTION INTRAVENOUS; SUBCUTANEOUS PRN
Status: DISCONTINUED | OUTPATIENT
Start: 2025-01-14 | End: 2025-01-14 | Stop reason: HOSPADM

## 2025-01-14 RX ORDER — HYDRALAZINE HYDROCHLORIDE 20 MG/ML
10 INJECTION INTRAMUSCULAR; INTRAVENOUS EVERY 10 MIN PRN
Status: COMPLETED | OUTPATIENT
Start: 2025-01-14 | End: 2025-01-14

## 2025-01-14 RX ORDER — DOCUSATE SODIUM 100 MG/1
100 CAPSULE, LIQUID FILLED ORAL 2 TIMES DAILY
COMMUNITY

## 2025-01-14 RX ORDER — ASPIRIN 81 MG/1
81 TABLET, CHEWABLE ORAL DAILY
Status: DISCONTINUED | OUTPATIENT
Start: 2025-01-15 | End: 2025-01-16 | Stop reason: HOSPADM

## 2025-01-14 RX ORDER — ASPIRIN 81 MG/1
TABLET, CHEWABLE ORAL
Status: COMPLETED
Start: 2025-01-14 | End: 2025-01-14

## 2025-01-14 RX ORDER — DULOXETIN HYDROCHLORIDE 60 MG/1
60 CAPSULE, DELAYED RELEASE ORAL DAILY
Status: DISCONTINUED | OUTPATIENT
Start: 2025-01-14 | End: 2025-01-16 | Stop reason: HOSPADM

## 2025-01-14 RX ORDER — LANOLIN ALCOHOL/MO/W.PET/CERES
100 CREAM (GRAM) TOPICAL DAILY
Status: DISCONTINUED | OUTPATIENT
Start: 2025-01-15 | End: 2025-01-16 | Stop reason: HOSPADM

## 2025-01-14 RX ORDER — THIAMINE MONONITRATE (VIT B1) 100 MG
100 TABLET ORAL DAILY
COMMUNITY

## 2025-01-14 RX ORDER — DILTIAZEM HYDROCHLORIDE 180 MG/1
360 CAPSULE, COATED, EXTENDED RELEASE ORAL DAILY
Status: DISCONTINUED | OUTPATIENT
Start: 2025-01-14 | End: 2025-01-15

## 2025-01-14 RX ORDER — POTASSIUM CHLORIDE 1500 MG/1
40 TABLET, EXTENDED RELEASE ORAL PRN
Status: DISCONTINUED | OUTPATIENT
Start: 2025-01-14 | End: 2025-01-16 | Stop reason: HOSPADM

## 2025-01-14 RX ORDER — M-VIT,TX,IRON,MINS/CALC/FOLIC 27MG-0.4MG
1 TABLET ORAL DAILY
Status: DISCONTINUED | OUTPATIENT
Start: 2025-01-15 | End: 2025-01-16 | Stop reason: HOSPADM

## 2025-01-14 RX ORDER — IOPAMIDOL 755 MG/ML
INJECTION, SOLUTION INTRAVASCULAR PRN
Status: DISCONTINUED | OUTPATIENT
Start: 2025-01-14 | End: 2025-01-14 | Stop reason: HOSPADM

## 2025-01-14 RX ORDER — HEPARIN SODIUM 1000 [USP'U]/ML
5000 INJECTION, SOLUTION INTRAVENOUS; SUBCUTANEOUS ONCE
Status: COMPLETED | OUTPATIENT
Start: 2025-01-14 | End: 2025-01-14

## 2025-01-14 RX ORDER — ONDANSETRON 4 MG/1
4 TABLET, ORALLY DISINTEGRATING ORAL EVERY 8 HOURS PRN
Status: DISCONTINUED | OUTPATIENT
Start: 2025-01-14 | End: 2025-01-16 | Stop reason: HOSPADM

## 2025-01-14 RX ORDER — ATORVASTATIN CALCIUM 80 MG/1
80 TABLET, FILM COATED ORAL NIGHTLY
Status: DISCONTINUED | OUTPATIENT
Start: 2025-01-14 | End: 2025-01-16 | Stop reason: HOSPADM

## 2025-01-14 RX ORDER — SODIUM CHLORIDE 9 MG/ML
INJECTION, SOLUTION INTRAVENOUS CONTINUOUS
Status: ACTIVE | OUTPATIENT
Start: 2025-01-14 | End: 2025-01-14

## 2025-01-14 RX ORDER — MAGNESIUM SULFATE IN WATER 40 MG/ML
2000 INJECTION, SOLUTION INTRAVENOUS PRN
Status: DISCONTINUED | OUTPATIENT
Start: 2025-01-14 | End: 2025-01-16 | Stop reason: HOSPADM

## 2025-01-14 RX ORDER — POTASSIUM CHLORIDE 7.45 MG/ML
10 INJECTION INTRAVENOUS PRN
Status: DISCONTINUED | OUTPATIENT
Start: 2025-01-14 | End: 2025-01-16 | Stop reason: HOSPADM

## 2025-01-14 RX ORDER — ATORVASTATIN CALCIUM 10 MG/1
20 TABLET, FILM COATED ORAL DAILY
Status: DISCONTINUED | OUTPATIENT
Start: 2025-01-14 | End: 2025-01-14

## 2025-01-14 RX ORDER — WARFARIN SODIUM 5 MG/1
2.5 TABLET ORAL ONCE
Status: COMPLETED | OUTPATIENT
Start: 2025-01-14 | End: 2025-01-14

## 2025-01-14 RX ORDER — ROPINIROLE 2 MG/1
2 TABLET, FILM COATED ORAL 4 TIMES DAILY
Status: DISCONTINUED | OUTPATIENT
Start: 2025-01-14 | End: 2025-01-16 | Stop reason: HOSPADM

## 2025-01-14 RX ORDER — ONDANSETRON 2 MG/ML
4 INJECTION INTRAMUSCULAR; INTRAVENOUS EVERY 6 HOURS PRN
Status: DISCONTINUED | OUTPATIENT
Start: 2025-01-14 | End: 2025-01-16 | Stop reason: HOSPADM

## 2025-01-14 RX ORDER — NITROGLYCERIN 0.4 MG/1
0.4 TABLET SUBLINGUAL EVERY 5 MIN PRN
Status: COMPLETED | OUTPATIENT
Start: 2025-01-14 | End: 2025-01-14

## 2025-01-14 RX ADMIN — NITROGLYCERIN 0.4 MG: 0.4 TABLET SUBLINGUAL at 13:02

## 2025-01-14 RX ADMIN — ROPINIROLE HYDROCHLORIDE 2 MG: 2 TABLET, FILM COATED ORAL at 19:06

## 2025-01-14 RX ADMIN — NITROGLYCERIN 0.4 MG: 0.4 TABLET SUBLINGUAL at 12:41

## 2025-01-14 RX ADMIN — DILTIAZEM HYDROCHLORIDE 360 MG: 180 CAPSULE, COATED, EXTENDED RELEASE ORAL at 19:38

## 2025-01-14 RX ADMIN — DULOXETINE HYDROCHLORIDE 60 MG: 60 CAPSULE, DELAYED RELEASE ORAL at 19:38

## 2025-01-14 RX ADMIN — NITROGLYCERIN 0.4 MG: 0.4 TABLET SUBLINGUAL at 12:29

## 2025-01-14 RX ADMIN — TAMSULOSIN HYDROCHLORIDE 0.4 MG: 0.4 CAPSULE ORAL at 20:14

## 2025-01-14 RX ADMIN — WARFARIN SODIUM 2.5 MG: 5 TABLET ORAL at 20:14

## 2025-01-14 RX ADMIN — HYDRALAZINE HYDROCHLORIDE 10 MG: 20 INJECTION INTRAMUSCULAR; INTRAVENOUS at 19:41

## 2025-01-14 RX ADMIN — HYDROCODONE BITARTRATE AND ACETAMINOPHEN 2 TABLET: 5; 325 TABLET ORAL at 23:29

## 2025-01-14 RX ADMIN — ATORVASTATIN CALCIUM 80 MG: 80 TABLET, FILM COATED ORAL at 20:14

## 2025-01-14 RX ADMIN — TICAGRELOR 180 MG: 90 TABLET ORAL at 12:39

## 2025-01-14 RX ADMIN — HEPARIN SODIUM 5000 UNITS: 1000 INJECTION INTRAVENOUS; SUBCUTANEOUS at 12:39

## 2025-01-14 RX ADMIN — HYDRALAZINE HYDROCHLORIDE 10 MG: 20 INJECTION INTRAMUSCULAR; INTRAVENOUS at 20:18

## 2025-01-14 RX ADMIN — HYDROCODONE BITARTRATE AND ACETAMINOPHEN 2 TABLET: 5; 325 TABLET ORAL at 17:27

## 2025-01-14 RX ADMIN — HEPARIN SODIUM AND DEXTROSE 6.6 UNITS/KG/HR: 10000; 5 INJECTION INTRAVENOUS at 12:41

## 2025-01-14 RX ADMIN — SODIUM CHLORIDE: 9 INJECTION, SOLUTION INTRAVENOUS at 19:38

## 2025-01-14 RX ADMIN — ASPIRIN 324 MG: 81 TABLET, CHEWABLE ORAL at 12:28

## 2025-01-14 SDOH — ECONOMIC STABILITY: FOOD INSECURITY: WITHIN THE PAST 12 MONTHS, YOU WORRIED THAT YOUR FOOD WOULD RUN OUT BEFORE YOU GOT MONEY TO BUY MORE.: NEVER TRUE

## 2025-01-14 SDOH — ECONOMIC STABILITY: FOOD INSECURITY: WITHIN THE PAST 12 MONTHS, THE FOOD YOU BOUGHT JUST DIDN'T LAST AND YOU DIDN'T HAVE MONEY TO GET MORE.: NEVER TRUE

## 2025-01-14 ASSESSMENT — ENCOUNTER SYMPTOMS
NAUSEA: 0
SORE THROAT: 0
ABDOMINAL PAIN: 0
SHORTNESS OF BREATH: 0
DIARRHEA: 0
CHEST TIGHTNESS: 0
VOMITING: 0
WHEEZING: 0
RHINORRHEA: 0
ABDOMINAL DISTENTION: 0
COUGH: 0

## 2025-01-14 ASSESSMENT — PAIN SCALES - GENERAL
PAINLEVEL_OUTOF10: 4
PAINLEVEL_OUTOF10: 4
PAINLEVEL_OUTOF10: 7
PAINLEVEL_OUTOF10: 0
PAINLEVEL_OUTOF10: 6
PAINLEVEL_OUTOF10: 6
PAINLEVEL_OUTOF10: 4
PAINLEVEL_OUTOF10: 4

## 2025-01-14 ASSESSMENT — PAIN - FUNCTIONAL ASSESSMENT
PAIN_FUNCTIONAL_ASSESSMENT: 0-10

## 2025-01-14 ASSESSMENT — PAIN DESCRIPTION - ORIENTATION: ORIENTATION: MID

## 2025-01-14 ASSESSMENT — PAIN DESCRIPTION - DESCRIPTORS: DESCRIPTORS: ACHING

## 2025-01-14 ASSESSMENT — PAIN SCALES - WONG BAKER: WONGBAKER_NUMERICALRESPONSE: NO HURT

## 2025-01-14 ASSESSMENT — PAIN DESCRIPTION - LOCATION
LOCATION: CHEST
LOCATION: HEAD

## 2025-01-14 NOTE — ED NOTES
Report given to OSMAN Hatfield medic.  Report called to FAUSTO Batista in Cath lab.     Pt left via stretcher with [facility]. VSS. No further needs.

## 2025-01-14 NOTE — H&P
[facility] Medicine History & Physical    V 1.6    Date of Admission: 1/14/2025    Date of Service:  Pt seen/examined on 1/14/2025       [x]Admitted to Inpatient with expected LOS greater than two midnights due to medical therapy.  []Placed in Observation status.    Chief Admission Complaint: Chest pain    Presenting Admission History:      69 y.o. male who presented to [facility] with chest pain.  Patient states he was carrying a 50 pound bag of dog food and developed chest pain.  He states it was substernal.  Radiating into both arms.  Patient does have history of CHF.  He states he has not had pain like this previously.  Patient states he did have a little dizziness during his ambulance ride but no significant shortness of breath.  Chest pain very mild currently.  Patient states he takes Coumadin for history atrial fibrillation.      Cath report:    Procedure Performed:  [facility]  LVG     Procedure Findings:  Normal coronaries  Normal left heart catheterization  Normal left ventricular function     Conclusion/Recommendations:  False positive EKG  Noncardiac chest pain evaluation  Admit to medical service    PMHx significant for:    Past Medical History:   Diagnosis Date    A-fib (HCC)     Acute congestive heart failure (HCC) 02/17/2023    Anxiety     Asthma     Atrial fibrillation (HCC)     BPH     prostate 5/05,     CHF (congestive heart failure) (HCC)     Dermatitis due to sun     severe    Hearing loss     decreased hearing left ear    Hyperlipidemia     Hypertension     pos GXT 3/01, neg GXT 3/07    Narcolepsy     Osteoarthritis     Pneumonia     Sleep apnea     bipap    Superficial peroneal nerve neuropathy     bilateral    Urinary incontinence 01/01/2024         Assessment/Plan:      Current Principal Problem:  Status post cardiac catheterization    1.  Noncardiac chest pain.  Patient will be admitted for observation.  Status post negative left heart cath.  Cardiology following.  Continue  nerve neuropathy     bilateral    Urinary incontinence 01/01/2024       Past Surgical History:        Procedure Laterality Date    APPENDECTOMY      CHOLECYSTECTOMY      COLONOSCOPY  12/19/2011    KNEE SURGERY Right 04/22/2016    Rt total knee    OTHER SURGICAL HISTORY Right 09/01/2016    RIGHT TOTAL SHOULDER REPLACEMENT          SHOULDER ARTHROPLASTY  06/19/2012    Lt total shoulder arthroplasty    TOTAL KNEE ARTHROPLASTY Left 07/19/2016    VASECTOMY  09/01/1978       Medications Prior to Admission:   Prior to Admission medications    Medication Sig Start Date End Date Taking? Authorizing Provider   vitamin B-1 (THIAMINE) 100 MG tablet Take 1 tablet by mouth daily    Moraima Strauss MD   docusate sodium (COLACE) 100 MG capsule Take 1 capsule by mouth 2 times daily    Moraima Strauss MD   torsemide (DEMADEX) 10 MG tablet Take 1 tablet by mouth daily Take as needed    Moraima Strauss MD   HYDROcodone-acetaminophen (NORCO) 5-325 MG per tablet Take 2 tablets by mouth every 6 hours as needed for Pain. Max Daily Amount: 8 tablets    Moraima Strauss MD   rOPINIRole (REQUIP) 2 MG tablet Take 1 tablet by mouth 4 times daily    Provider, Historical, MD   Handicap Placard MISC by Does not apply route lifetime 1/14/25   Imani Sahu APRN - CNP   tamsulosin (FLOMAX) 0.4 MG capsule Take 1 capsule by mouth 2 times daily 1/14/25   Imani Sahu APRN - CNP   DULoxetine (CYMBALTA) 60 MG extended release capsule TAKE 1 CAPSULE EVERY DAY 6/30/23   Imani Sahu APRN - CNP   atorvastatin (LIPITOR) 20 MG tablet Take 1 tablet by mouth daily 6/7/23   Nicholas Erickson MD   warfarin (COUMADIN) 5 MG tablet Take 1 tablet by mouth daily  Patient taking differently: Take 1 tablet by mouth daily Monday and Wednesday 7.5mg on these days 6/7/23   Nicholas Erickson MD   dilTIAZem (CARDIZEM CD) 360 MG extended release capsule Take 1 capsule by mouth daily 6/7/23   Nicholas Erickson MD   sildenafil (VIAGRA) 50 MG

## 2025-01-14 NOTE — ED NOTES
1227  Call placed for interventional cardiology    1230  Interventionalist returned call. [facility] not flying.    1233  Pt accepted by cardiology at [facility], per them contact [facility] hospitalist.    1235  [facility] contacted, will be here in 30-40 minutes.     1254  EMTALA, physician certification completed. Snap shot copied, all given to FAUSTO Hess.

## 2025-01-14 NOTE — CONSULTS
[facility]   Cardiovascular Evaluation    PATIENT: Nick Hawkins  DATE: 2025  MRN: [id number]  CSN: [id number]  : 1955    Primary Care Doctor/Referring provider: Sue Maxwell MD      Reason for evaluation/Chief complaint:   Abnormal EKG - possible STEMI    Subjective:    History of present illness on initial date of evaluation:   Nick Hawkins is a 69 y.o. patient who presents as a emergent referral for abnormal EKG and possible injury pattern on EKG.  The patient presents from [facility] emergency room with acute onset of chest pain over the past day.  Patient presented initially to their primary care provider with vague complaints but eventually to the emergency room this morning.  After evaluation within the emergency room, initial EKG demonstrated inferior ST segment changes.  Consultation and concern led to referral to the [facility] cardiac catheterization lab for urgent evaluation.  The patient was loaded with IV heparin, oral Brilinta, and aspirin.  Unfortunately, patient was unable to be transferred via [facility] and was brought via ground.  On arrival to the [facility] cardiac Cath Lab, the patient was hemodynamically stable but had 4 out of 10 Tashman.      Patient Active Problem List   Diagnosis    Arthritis    Anxiety    Mild intermittent asthma without complication    Colon polyp    Testosterone deficiency    left TSR    Impotence due to erectile dysfunction    Uncontrolled hypertension    Primary narcolepsy without cataplexy    Benign non-nodular prostatic hyperplasia with lower urinary tract symptoms    Hypercholesteremia    Neuropathy involving both lower extremities    Vitamin D deficiency    Morbid obesity with BMI of 40.0-44.9, adult    NICOLE (obstructive sleep apnea)    Status post total right knee replacement    Medication management    Atrial fibrillation (HCC)    RBBB    Cardiomegaly    Pneumonia due to infectious  EMR. Every effort was made to ensure accuracy; however, inadvertent computerized transcription errors may be present.    Tai Estrella MD, POLO, [facility], [facility]  [phone] [facility]  [phone] [facility]  1/14/2025  2:39 PM

## 2025-01-14 NOTE — PATIENT INSTRUCTIONS
Please read the healthy family handout that you were given and share it with your family.       Please compare this printed medication list with your medications at home to be sure they are the same.  If you have any medications that are different please contact us immediately at [phone].     Also review your allergies that we have listed, these may also include medications that you have not been able to tolerate, make sure everything listed is correct. If you have any allergies that are different please contact us immediately at [phone].     You may receive a survey in the mail or by email asking about your experience during your visit today. Please complete and return to us so we know how we are serving you.

## 2025-01-14 NOTE — POST SEDATION
Patient:  Nick Hawkins   :   1955    A pre-sedation re-evaluation was performed immediately at the end of the procedure.  Procedure:  Cardiac cath  Medications: Procedural sedation with minimal conscious sedation  Complications: None  Estimated Blood Loss: none  Specimens: Were not obtained        Darrell Medication and Procedural Reconciliation:  I agree that the documented medications and procedures performed are true.  The medications were given under my order.  The procedures were performed under my direct supervision.     An immediate re-assessment was completed prior to sedation, and it is determined to be safe to proceed.

## 2025-01-14 NOTE — PRE SEDATION
Sedation Plan  ASA: class 4 - patient with severe systemic disease that is a constant threat to life     Mallampati class: III - soft palate, base of uvula visible.    Sedation plan: local anesthesia and level 2-1: moderate/analgesia (conscious sedation)    Risks, benefits, and alternatives discussed with patient.        Immediate reassessment prior to sedation:  Patient's status reviewed and vital signs assessed; acceptable to perform procedure and proceed to administer sedation as planned.

## 2025-01-14 NOTE — ED NOTES
Pt chest pain improving. BP still elevated. Spoke to MD. No new orders for blood pressure management.

## 2025-01-14 NOTE — TELEPHONE ENCOUNTER
Requesting a refill of oxybutynin cl er 5 mg one a day. Send to [facility]. It was prescribed by previous pcp in [place]

## 2025-01-14 NOTE — PROGRESS NOTES
Pharmacy to Manage Heparin Infusion per Hospital [facility]    Low dose weight based heparin ordered by Dr Maxwell at [facility].  Pt wt = 151.9 kg (actual weight)  Baseline aPTT = 34.7 sec at 1222.    Heparin low dose weight based infusion is ordered for patient. Per chart review, patient has not received an oral Factor Xa inhibitor within the last 72 hours. As oral Factor Xa inhibitors can impact the anti-Xa test calibrated to unfractionated heparin, Heparin infusion will be monitored and adjusted using anti-Xa per [facility].        Heparin (weight-based) Infusion: CAD/STEMI/NSTEMI/AFIB  Heparin 60units/kg IVP bolus (5000 units per MD order) followed by Heparin infusion at 6.6 units/kg/hr (recommended max initial rate: 1000units/hr).  Check  anti-Xa  in 6 hours.   anti-Xa < 0.1            Heparin 60 units/kg bolus (max 4,000 units)    Increase infusion by 4 units/kg/hr  anti-Xa 0.1-0.29       Heparin 30 units/kg bolus (max 2,000 units)    Increase infusion by 2 units/kg/hr  anti-Xa 0.3-0.7         No bolus                                                           No change   anti-Xa 0.71-0.8       No bolus                                                           Decrease infusion by 1 units/kg/hr  anti-Xa 0.81-0.99     No bolus                                                           Decrease infusion by 2 units/kg/hr  anti-Xa 1 or more     Hold heparin for 1 hour                                    Decrease infusion by 3 units/kg/hr    When Anti-Xa  is within target range for two consecutive times, check Anti-Xa once daily with morning labs.    While at [facility] ED will use APTT to adjust heparin. Will switch to anti-Xa after patient leaves [facility] ED and is admitted.  Pharmacy will continue to monitor and adjust as necessary.]

## 2025-01-14 NOTE — ED PROVIDER NOTES
otherwise noted below, none     Procedures    CRITICAL CARE TIME   40 Minutes of critical care time was spent with this patient.  This excludes time spent on separate billable procedure      EMERGENCY DEPARTMENT COURSE and DIFFERENTIAL DIAGNOSIS/MDM:   Vitals:    Vitals:    01/14/25 1246 01/14/25 1251 01/14/25 1301 01/14/25 1307   BP: (!) 164/104 (!) 164/113 (!) 177/115 (!) 153/101   Pulse: 94 90 78 95   Resp: 18 18 18 18   Temp:       TempSrc:       SpO2: 94% 93% 94% 93%   Weight:       Height:           Patient was given the following medications:  Medications   heparin (porcine) injection 4,000 Units (has no administration in time range)   heparin (porcine) injection 2,000 Units (has no administration in time range)   heparin 25,000 units in dextrose 5% 250 mL (premix) infusion (0 Units/kg/hr × 151.9 kg IntraVENous Patient Transferred to Other Facility 1/14/25 1317)   aspirin chewable tablet 324 mg (324 mg Oral Given 1/14/25 1228)   nitroGLYCERIN (NITROSTAT) SL tablet 0.4 mg (0.4 mg SubLINGual Given 1/14/25 1302)   ticagrelor (BRILINTA) tablet 180 mg (180 mg Oral Given 1/14/25 1239)   heparin (porcine) injection 5,000 Units (5,000 Units IntraVENous Given 1/14/25 1239)            Is this patient to be included in the SEP-1 Core Measure due to severe sepsis or septic shock?       PAST MEDICAL HISTORY      has a past medical history of A-fib ([facility]), Acute congestive heart failure (HCC) (02/17/2023), Anxiety, Asthma, Atrial fibrillation ([facility]), BPH, CHF (congestive heart failure) ([facility]), Dermatitis due to sun, Hearing loss, Hyperlipidemia, Hypertension, Narcolepsy, Osteoarthritis, Pneumonia, Sleep apnea, Superficial peroneal nerve neuropathy, and Urinary incontinence (01/01/2024).     CC/HPI Summary, DDx, ED Course, and Reassessment: Nick Hawkins is a 69 y.o. male who presents to the emergency department with chest pain.  Patient states this started last night he thought he had some reflux symptoms.  He actually saw

## 2025-01-14 NOTE — PLAN OF CARE
Patient admitted to room 422  from ER.  Patient oriented to room, call light, bed rails, phone, lights and bathroom.  Patient instructed about the schedule of the day including: vital sign frequency, lab draws, possible tests, frequency of MD and staff rounds, including RN/MD rounding together at bedside, daily weights, and I &O's.  Patient instructed about prescribed diet, how to use 8MENU, and television.  Telemetry box  in place, patient aware of placement and reason.  Bed locked, in lowest position, side rails up 2/4, call light within reach.    Site checked, clean dry and intact. No hematoma, no drainage

## 2025-01-14 NOTE — RT PROTOCOL NOTE
RT Inhaler-Nebulizer Bronchodilator Protocol Note    There is a bronchodilator order in the chart from a provider indicating to follow the RT Bronchodilator Protocol and there is an “Initiate RT Inhaler-Nebulizer Bronchodilator Protocol” order as well (see protocol at bottom of note).    CXR Findings:  XR CHEST PORTABLE    Result Date: 1/14/2025  Findings suggestive of CHF. No significant interval change.       The findings from the last RT Protocol Assessment were as follows:   History Pulmonary Disease: Smoker 15 pack years or more  Respiratory Pattern: Regular pattern and RR 12-20 bpm  Breath Sounds: Slightly diminished and/or crackles  Cough: Strong, spontaneous, non-productive  Indication for Bronchodilator Therapy: On home bronchodilators  Bronchodilator Assessment Score: 3    Aerosolized bronchodilator medication orders have been revised according to the RT Inhaler-Nebulizer Bronchodilator Protocol below.    Respiratory Therapist to perform RT Therapy Protocol Assessment initially then follow the protocol.  Repeat RT Therapy Protocol Assessment PRN for score 0-3 or on second treatment, BID, and PRN for scores above 3.    No Indications - adjust the frequency to every 6 hours PRN wheezing or bronchospasm, if no treatments needed after 48 hours then discontinue using Per Protocol order mode.     If indication present, adjust the RT bronchodilator orders based on the Bronchodilator Assessment Score as indicated below.  Use Inhaler orders unless patient has one or more of the following: on home nebulizer, not able to hold breath for 10 seconds, is not alert and oriented, cannot activate and use MDI correctly, or respiratory rate 25 breaths per minute or more, then use the equivalent nebulizer order(s) with same Frequency and PRN reasons based on the score.  If a patient is on this medication at home then do not decrease Frequency below that used at home.    0-3 - enter or revise RT bronchodilator order(s) to

## 2025-01-14 NOTE — TELEPHONE ENCOUNTER
Patient also called for this medication too, per [facility] still waiting on records, [facility] faxed another request.   Requesting a refill of oxybutynin cl er 5 mg one a day. Send to [facility]. It was prescribed by previous pcp in [place]

## 2025-01-14 NOTE — PROCEDURES
Post Cardiac Catheterization Note.  Full details available in procedure log    DATE: 1/14/2025  PATIENT: Nick Hawkins  MRN: [id number]    Procedure Performed:  CORS  LH  LVG    Procedure Findings:  Normal coronaries  Normal left heart catheterization  Normal left ventricular function    Conclusion/Recommendations:  False positive EKG  Noncardiac chest pain evaluation  Admit to medical service    Tai Estrella  Interventional Cardiology  [facility]  Office [phone]

## 2025-01-15 ENCOUNTER — APPOINTMENT (OUTPATIENT)
Age: 70
End: 2025-01-15
Attending: INTERNAL MEDICINE
Payer: MEDICARE

## 2025-01-15 ENCOUNTER — APPOINTMENT (OUTPATIENT)
Dept: CT IMAGING | Age: 70
End: 2025-01-15
Attending: INTERNAL MEDICINE
Payer: MEDICARE

## 2025-01-15 PROBLEM — R07.9 CHEST PAIN: Status: ACTIVE | Noted: 2025-01-15

## 2025-01-15 PROBLEM — I50.32 CHRONIC HEART FAILURE WITH PRESERVED EJECTION FRACTION (HFPEF) (HCC): Status: ACTIVE | Noted: 2025-01-15

## 2025-01-15 PROBLEM — I77.810 ASCENDING AORTA DILATATION (HCC): Status: ACTIVE | Noted: 2025-01-15

## 2025-01-15 PROBLEM — I10 ESSENTIAL HYPERTENSION: Status: ACTIVE | Noted: 2025-01-15

## 2025-01-15 LAB
25(OH)D3 SERPL-MCNC: 23.3 NG/ML
ANION GAP SERPL CALCULATED.3IONS-SCNC: 8 MMOL/L (ref 3–16)
BUN SERPL-MCNC: 17 MG/DL (ref 7–20)
CALCIUM SERPL-MCNC: 9.4 MG/DL (ref 8.3–10.6)
CHLORIDE SERPL-SCNC: 103 MMOL/L (ref 99–110)
CO2 SERPL-SCNC: 26 MMOL/L (ref 21–32)
CREAT SERPL-MCNC: 0.9 MG/DL (ref 0.8–1.3)
DEPRECATED RDW RBC AUTO: 15.5 % (ref 12.4–15.4)
ECHO AO ROOT DIAM: 3.8 CM
ECHO AO ROOT INDEX: 1.44 CM/M2
ECHO AV AREA PEAK VELOCITY: 2.1 CM2
ECHO AV AREA VTI: 2.4 CM2
ECHO AV AREA/BSA PEAK VELOCITY: 0.8 CM2/M2
ECHO AV AREA/BSA VTI: 0.9 CM2/M2
ECHO AV MEAN GRADIENT: 9 MMHG
ECHO AV MEAN VELOCITY: 1.4 M/S
ECHO AV PEAK GRADIENT: 17 MMHG
ECHO AV PEAK VELOCITY: 2.1 M/S
ECHO AV VELOCITY RATIO: 0.57
ECHO AV VTI: 44.2 CM
ECHO BSA: 2.76 M2
ECHO LA AREA 2C: 21.9 CM2
ECHO LA AREA 4C: 24.3 CM2
ECHO LA MAJOR AXIS: 7.8 CM
ECHO LA MINOR AXIS: 5.1 CM
ECHO LA VOL MOD A2C: 73 ML (ref 18–58)
ECHO LA VOL MOD A4C: 58 ML (ref 18–58)
ECHO LA VOLUME INDEX MOD A2C: 28 ML/M2 (ref 16–34)
ECHO LA VOLUME INDEX MOD A4C: 22 ML/M2 (ref 16–34)
ECHO LV E' LATERAL VELOCITY: 9.08 CM/S
ECHO LV E' SEPTAL VELOCITY: 6.81 CM/S
ECHO LV EDV A2C: 243 ML
ECHO LV EDV A4C: 165 ML
ECHO LV EDV INDEX A4C: 63 ML/M2
ECHO LV EDV NDEX A2C: 92 ML/M2
ECHO LV EJECTION FRACTION A2C: 57 %
ECHO LV EJECTION FRACTION A4C: 61 %
ECHO LV EJECTION FRACTION BIPLANE: 56 % (ref 55–100)
ECHO LV ESV A2C: 105 ML
ECHO LV ESV A4C: 65 ML
ECHO LV ESV INDEX A2C: 40 ML/M2
ECHO LV ESV INDEX A4C: 25 ML/M2
ECHO LVOT AREA: 3.8 CM2
ECHO LVOT AV VTI INDEX: 0.62
ECHO LVOT DIAM: 2.2 CM
ECHO LVOT MEAN GRADIENT: 3 MMHG
ECHO LVOT PEAK GRADIENT: 5 MMHG
ECHO LVOT PEAK VELOCITY: 1.2 M/S
ECHO LVOT STROKE VOLUME INDEX: 39.6 ML/M2
ECHO LVOT SV: 104.1 ML
ECHO LVOT VTI: 27.4 CM
ECHO MV A VELOCITY: 0.62 M/S
ECHO MV AREA VTI: 3.9 CM2
ECHO MV E DECELERATION TIME (DT): 246 MS
ECHO MV E VELOCITY: 0.77 M/S
ECHO MV E/A RATIO: 1.24
ECHO MV E/E' LATERAL: 8.48
ECHO MV E/E' RATIO (AVERAGED): 9.89
ECHO MV E/E' SEPTAL: 11.31
ECHO MV LVOT VTI INDEX: 0.96
ECHO MV MAX VELOCITY: 0.7 M/S
ECHO MV MEAN GRADIENT: 1 MMHG
ECHO MV MEAN VELOCITY: 0.5 M/S
ECHO MV PEAK GRADIENT: 2 MMHG
ECHO MV VTI: 26.4 CM
ECHO PV MAX VELOCITY: 1 M/S
ECHO PV PEAK GRADIENT: 4 MMHG
ECHO RV FREE WALL PEAK S': 13.5 CM/S
ECHO RV TAPSE: 2 CM (ref 1.7–?)
EKG ATRIAL RATE: 61 BPM
EKG DIAGNOSIS: NORMAL
EKG P AXIS: 33 DEGREES
EKG P-R INTERVAL: 216 MS
EKG Q-T INTERVAL: 458 MS
EKG QRS DURATION: 142 MS
EKG QTC CALCULATION (BAZETT): 461 MS
EKG R AXIS: -45 DEGREES
EKG T AXIS: 16 DEGREES
EKG VENTRICULAR RATE: 61 BPM
EST. AVERAGE GLUCOSE BLD GHB EST-MCNC: 108.3 MG/DL
GFR SERPLBLD CREATININE-BSD FMLA CKD-EPI: >90 ML/MIN/{1.73_M2}
GLUCOSE SERPL-MCNC: 118 MG/DL (ref 70–99)
HBA1C MFR BLD: 5.4 %
HCT VFR BLD AUTO: 39.9 % (ref 40.5–52.5)
HGB BLD-MCNC: 13.3 G/DL (ref 13.5–17.5)
INR PPP: 1.74 (ref 0.85–1.15)
MCH RBC QN AUTO: 28.9 PG (ref 26–34)
MCHC RBC AUTO-ENTMCNC: 33.4 G/DL (ref 31–36)
MCV RBC AUTO: 86.5 FL (ref 80–100)
PLATELET # BLD AUTO: 149 K/UL (ref 135–450)
PMV BLD AUTO: 9.3 FL (ref 5–10.5)
POTASSIUM SERPL-SCNC: 4.1 MMOL/L (ref 3.5–5.1)
PROTHROMBIN TIME: 20.5 SEC (ref 11.9–14.9)
RBC # BLD AUTO: 4.62 M/UL (ref 4.2–5.9)
SODIUM SERPL-SCNC: 137 MMOL/L (ref 136–145)
WBC # BLD AUTO: 8.5 K/UL (ref 4–11)

## 2025-01-15 PROCEDURE — 93005 ELECTROCARDIOGRAM TRACING: CPT | Performed by: NURSE PRACTITIONER

## 2025-01-15 PROCEDURE — 2500000003 HC RX 250 WO HCPCS: Performed by: INTERNAL MEDICINE

## 2025-01-15 PROCEDURE — 6360000004 HC RX CONTRAST MEDICATION: Performed by: INTERNAL MEDICINE

## 2025-01-15 PROCEDURE — 6370000000 HC RX 637 (ALT 250 FOR IP): Performed by: INTERNAL MEDICINE

## 2025-01-15 PROCEDURE — 36415 COLL VENOUS BLD VENIPUNCTURE: CPT

## 2025-01-15 PROCEDURE — 99233 SBSQ HOSP IP/OBS HIGH 50: CPT | Performed by: NURSE PRACTITIONER

## 2025-01-15 PROCEDURE — 2060000000 HC ICU INTERMEDIATE R&B

## 2025-01-15 PROCEDURE — 71260 CT THORAX DX C+: CPT

## 2025-01-15 PROCEDURE — 85027 COMPLETE CBC AUTOMATED: CPT

## 2025-01-15 PROCEDURE — 85610 PROTHROMBIN TIME: CPT

## 2025-01-15 PROCEDURE — 80048 BASIC METABOLIC PNL TOTAL CA: CPT

## 2025-01-15 PROCEDURE — 6360000004 HC RX CONTRAST MEDICATION: Performed by: NURSE PRACTITIONER

## 2025-01-15 PROCEDURE — 93306 TTE W/DOPPLER COMPLETE: CPT | Performed by: INTERNAL MEDICINE

## 2025-01-15 PROCEDURE — 6360000002 HC RX W HCPCS: Performed by: NURSE PRACTITIONER

## 2025-01-15 PROCEDURE — C8929 TTE W OR WO FOL WCON,DOPPLER: HCPCS

## 2025-01-15 PROCEDURE — 6370000000 HC RX 637 (ALT 250 FOR IP): Performed by: NURSE PRACTITIONER

## 2025-01-15 PROCEDURE — 93010 ELECTROCARDIOGRAM REPORT: CPT | Performed by: INTERNAL MEDICINE

## 2025-01-15 RX ORDER — SPIRONOLACTONE 25 MG/1
25 TABLET ORAL DAILY
Status: DISCONTINUED | OUTPATIENT
Start: 2025-01-15 | End: 2025-01-16 | Stop reason: HOSPADM

## 2025-01-15 RX ORDER — VALSARTAN 80 MG/1
80 TABLET ORAL DAILY
Status: DISCONTINUED | OUTPATIENT
Start: 2025-01-15 | End: 2025-01-16 | Stop reason: HOSPADM

## 2025-01-15 RX ORDER — DILTIAZEM HYDROCHLORIDE 240 MG/1
240 CAPSULE, COATED, EXTENDED RELEASE ORAL DAILY
Status: DISCONTINUED | OUTPATIENT
Start: 2025-01-16 | End: 2025-01-16 | Stop reason: HOSPADM

## 2025-01-15 RX ORDER — TORSEMIDE 20 MG/1
10 TABLET ORAL DAILY
Status: DISCONTINUED | OUTPATIENT
Start: 2025-01-16 | End: 2025-01-16 | Stop reason: HOSPADM

## 2025-01-15 RX ORDER — METOPROLOL SUCCINATE 25 MG/1
25 TABLET, EXTENDED RELEASE ORAL DAILY
Status: DISCONTINUED | OUTPATIENT
Start: 2025-01-15 | End: 2025-01-16 | Stop reason: HOSPADM

## 2025-01-15 RX ORDER — FUROSEMIDE 10 MG/ML
20 INJECTION INTRAMUSCULAR; INTRAVENOUS ONCE
Status: COMPLETED | OUTPATIENT
Start: 2025-01-15 | End: 2025-01-15

## 2025-01-15 RX ORDER — WARFARIN SODIUM 5 MG/1
5 TABLET ORAL
Status: COMPLETED | OUTPATIENT
Start: 2025-01-15 | End: 2025-01-15

## 2025-01-15 RX ORDER — IOPAMIDOL 755 MG/ML
75 INJECTION, SOLUTION INTRAVASCULAR
Status: COMPLETED | OUTPATIENT
Start: 2025-01-15 | End: 2025-01-15

## 2025-01-15 RX ADMIN — DILTIAZEM HYDROCHLORIDE 360 MG: 180 CAPSULE, COATED, EXTENDED RELEASE ORAL at 09:02

## 2025-01-15 RX ADMIN — ATORVASTATIN CALCIUM 80 MG: 80 TABLET, FILM COATED ORAL at 21:57

## 2025-01-15 RX ADMIN — HYDROCODONE BITARTRATE AND ACETAMINOPHEN 2 TABLET: 5; 325 TABLET ORAL at 09:01

## 2025-01-15 RX ADMIN — VALSARTAN 80 MG: 80 TABLET, FILM COATED ORAL at 10:39

## 2025-01-15 RX ADMIN — ROPINIROLE HYDROCHLORIDE 2 MG: 2 TABLET, FILM COATED ORAL at 09:02

## 2025-01-15 RX ADMIN — Medication 1 TABLET: at 09:02

## 2025-01-15 RX ADMIN — ROPINIROLE HYDROCHLORIDE 2 MG: 2 TABLET, FILM COATED ORAL at 21:57

## 2025-01-15 RX ADMIN — TAMSULOSIN HYDROCHLORIDE 0.4 MG: 0.4 CAPSULE ORAL at 09:02

## 2025-01-15 RX ADMIN — SPIRONOLACTONE 25 MG: 25 TABLET ORAL at 10:39

## 2025-01-15 RX ADMIN — HYDROCODONE BITARTRATE AND ACETAMINOPHEN 2 TABLET: 5; 325 TABLET ORAL at 22:00

## 2025-01-15 RX ADMIN — SODIUM CHLORIDE, PRESERVATIVE FREE 10 ML: 5 INJECTION INTRAVENOUS at 09:02

## 2025-01-15 RX ADMIN — SULFUR HEXAFLUORIDE 3 ML: KIT at 11:30

## 2025-01-15 RX ADMIN — ROPINIROLE HYDROCHLORIDE 2 MG: 2 TABLET, FILM COATED ORAL at 17:41

## 2025-01-15 RX ADMIN — WARFARIN SODIUM 5 MG: 5 TABLET ORAL at 17:41

## 2025-01-15 RX ADMIN — Medication 100 MG: at 09:02

## 2025-01-15 RX ADMIN — IOPAMIDOL 75 ML: 755 INJECTION, SOLUTION INTRAVENOUS at 10:23

## 2025-01-15 RX ADMIN — FUROSEMIDE 20 MG: 10 INJECTION, SOLUTION INTRAMUSCULAR; INTRAVENOUS at 10:39

## 2025-01-15 RX ADMIN — SODIUM CHLORIDE, PRESERVATIVE FREE 10 ML: 5 INJECTION INTRAVENOUS at 09:03

## 2025-01-15 RX ADMIN — TAMSULOSIN HYDROCHLORIDE 0.4 MG: 0.4 CAPSULE ORAL at 21:57

## 2025-01-15 RX ADMIN — SODIUM CHLORIDE, PRESERVATIVE FREE 10 ML: 5 INJECTION INTRAVENOUS at 22:05

## 2025-01-15 RX ADMIN — ROPINIROLE HYDROCHLORIDE 2 MG: 2 TABLET, FILM COATED ORAL at 13:46

## 2025-01-15 RX ADMIN — SODIUM CHLORIDE, PRESERVATIVE FREE 10 ML: 5 INJECTION INTRAVENOUS at 22:06

## 2025-01-15 RX ADMIN — ASPIRIN 81 MG: 81 TABLET, CHEWABLE ORAL at 09:02

## 2025-01-15 RX ADMIN — DULOXETINE HYDROCHLORIDE 60 MG: 60 CAPSULE, DELAYED RELEASE ORAL at 09:02

## 2025-01-15 RX ADMIN — METOPROLOL SUCCINATE 25 MG: 25 TABLET, FILM COATED, EXTENDED RELEASE ORAL at 10:39

## 2025-01-15 ASSESSMENT — PAIN DESCRIPTION - LOCATION
LOCATION: CHEST;NECK
LOCATION: NECK;CHEST

## 2025-01-15 ASSESSMENT — PAIN SCALES - GENERAL
PAINLEVEL_OUTOF10: 0
PAINLEVEL_OUTOF10: 5
PAINLEVEL_OUTOF10: 2
PAINLEVEL_OUTOF10: 6

## 2025-01-15 NOTE — PROGRESS NOTES
[facility]   Daily Progress Note      Admit Date:  1/14/2025    Reason for follow up visit: Chest pain; PAF    CC: \"I'm still having chest heaviness intermittently. Pain medicine seems to help at times.\"    68 y/o male with PMH notable for PAF and S/P prior DCCV and on chronic warfarin, CHF, HTN, NICOLE and on BiPap nightly, HLD, narcolepsy admitted with chest pain and abnormal ECG. Presented to [facility] ED with CP and inferior ST segment changes on ECG. Transferred to [facility] on heparin, Brilinta and ASA. Taken for urgent cardia catheterization on 1/14/25 and demonstrated to have normal coronaries and deemed noncardiac chest pain.Received call from nurse this AM stating patient was having significant chest pain and concerned and asked to revisit.  Noted on to be having PAF on telemetry and c/o chest heaviness/pain, palpitations and L leg swelling and pain. Has not had INR followed since November 2024    Primary Cardiologist: Dr. Erickson    Interval History:  Pt. seen and examined; records reviewed  BP noted; elevated at times. Remains on room air  + chest heaviness  + SOB and L leg edema and pain  + occasional palpitations  Denies dizziness, cough  + chronic generalized arthritic pain and asking for pain med  He is on warfarin for his PAF (INR 1.74 today)  + chronic fatigue    Subjective:  Pt with no acute overnight cardiac events.     Objective:   BP (!) 142/75   Pulse 65   Temp 97.7 °F (36.5 °C) (Oral)   Resp 18   Ht 1.803 m (5' 11\")   Wt (!) 152 kg (335 lb 1.6 oz)   SpO2 96%   BMI 46.74 kg/m²     Intake/Output Summary (Last 24 hours) at 1/15/2025 0902  Last data filed at 1/15/2025 0324  Gross per 24 hour   Intake --   Output 1055 ml   Net -1055 ml     Wt Readings from Last 3 Encounters:   01/15/25 (!) 152 kg (335 lb 1.6 oz)   01/14/25 (!) 151.9 kg (334 lb 14.4 oz)   01/14/25 (!) 152 kg (335 lb)       Physical Exam:  General: In no acute distress. Awake, alert, and oriented X4. Resting in bed  HEENT;  recurrent atrial fib  -add low dose BB   -on Warfarin with subtherapeutic INR on admit  -discussed DOAC and he declines d/t cost  -continue diltiazem (if decreased LVEF will need to be changed to BB only)    7) NICOLE on BiPap  -uses nightly at home    8) Essential HTN  -needs tighter control  -goal BP < 130/80  -continue medical management    9) Chronic arthritic pain  -defer to Primary team    Will follow up CTPA, echo and meds adjusted. Once discharged will arrange for follow up with Dr. Erickson.     Discussed with Dr. Ware, Mr. Hawkins and wife at bedside    Time Based Itemization  A total of 55 minutes was spent on today's patient encounter.  If applicable, non-patient-facing activities:  ( X)Preparing to see the patient and reviewing records  ( X) Individual interpretation of results  (X ) Discussion or coordination of care with other health care professionals  (X ) Ordering of unique tests, medications, or procedures  (X ) Documentation within the EHR        Electronically signed by DIANE M ENZWEILER, APRN - CNP on 1/15/2025 at 9:02 AM

## 2025-01-15 NOTE — CONSULTS
Pharmacy Note  Warfarin Consult  Dx: Afib  Goal INR range 2-3  Home Warfarin dose: 7.5 mg Mon and Wed, 5 mg all other days     Date  INR  Warfarin   1/14                1.58                    2.5 mg (patient reports taking 5 mg already today)    Recommend Warfarin 2.5 mg tonight x1.  Daily INR ordered.  Rx will continue to manage therapy per consult order.     Abiodun Souza, PharmD    1/14/2025 7:45 PM

## 2025-01-15 NOTE — CARE COORDINATION
Advance Care Planning     General Advance Care Planning (ACP) Conversation    Date of Conversation: 1/15/2025  Conducted with: Patient with Decision Making Capacity  Other persons present: Spouse Amber    Healthcare Decision Maker:   Primary Decision Maker: Amber Hawkins - Spouse - [phone]       Content/Action Overview:  Has ACP document(s) NOT on file - requested patient to provide  Reviewed DNR/DNI and patient elects Full Code (Attempt Resuscitation)        Length of Voluntary ACP Conversation in minutes:  <16 minutes (Non-Billable)    Sonali Slater RN

## 2025-01-15 NOTE — PROGRESS NOTES
[facility] Medicine Progress Note  V 1.6      Date of Admission: 1/14/2025    Hospital Day: 2      Chief Admission Complaint: Chest pain    Subjective: Patient states he still having chest pain.  It is nonreproducible with palpation.    Presenting Admission History:       69 y.o. male who presented to [facility] with chest pain.  Patient states he was carrying a 50 pound bag of dog food and developed chest pain.  He states it was substernal.  Radiating into both arms.  Patient does have history of CHF.  He states he has not had pain like this previously.  Patient states he did have a little dizziness during his ambulance ride but no significant shortness of breath.  Chest pain very mild currently.  Patient states he takes Coumadin for history atrial fibrillation.      Assessment/Plan:      Current Principal Problem:  Status post cardiac catheterization    1.  Noncardiac chest pain.  Patient will be admitted for observation.  Status post negative left heart cath.  Cardiology following.  Continue statin and aspirin.  Agree with CT PE scan.  CT PE scan negative for embolism  2.  Atrial fibrillation.  Patient with paroxysmal atrial fibrillation.  Continue warfarin.  Pharmacy to dose.  Continue telemetry.  Patient will continue on calcium channel blocker for rate control.  3.  Chronic diastolic dysfunction.  No acute exacerbation.  Patient with chronic edema of his left leg.  Discussed with cardiology.  Patient will be given small dose of diuretic.  4.  Asthma.  Will continue DuoNebs as needed.  Patient not requiring supplemental oxygen.  5.  Hyponatremia.  Continue monitor with serial BMPs.  Sodium improved today.  May be secondary CHF.    Ongoing threat to life and/or bodily function without ongoing treatment due to: Chest pain    Consults:      IP CONSULT TO PHARMACY  IP CONSULT TO CARDIOLOGY        --------------------------------------------------      Medications:        Infusion Medications       [x]NOT yet ordered  []Ordered and Pending   []Seen with Recommendations for:   [x]Home independently  []Home w/ assist  []HHC  []SNF  []Acute Rehab    Multi-Disciplinary Rounds with Case Management completed on 1/15/2025 with the following recommendations:  Anticipated Discharge Location: [x]Home w/ []HHC vs []SNF  []Acute Rehab  []LTAC  []Hospice  []Other -    Anticipated Discharge Day/Date: 1 to 2 days  Barriers to Discharge: Chest pain  --------------------------------------------------    MDM (any 2 required for High level billing)    A. Problems (any 1)  [x] Acute/Chronic Illness/injury posing ongoing threat to life and/or bodily function without ongoing treatment    [] Severe exacerbation of chronic illness    --------------------------------------------------  B. Risk of Treatment (any 1)    [x] Drugs/treatments that require intensive monitoring for toxicity    [] IV ABX (Vancomycin, Aminoglycosides, etc)     [] Post-Cath/Contrast study requiring serial monitoring    [] IV Narcotic analgesia    [x] Aggressive IV diuresis    [] Hypertonic Saline    [] Critical electrolyte abnormalities requiring IV replacement    [] Insulin - Scheduled/SSI or Insulin gtt    [x] Anticoagulation (Heparin gtt or Coumadin - other anticoagulants in special circumstances)    [] Cardiac Medications (IV Amiodarone/Diltiazem, Tikosyn, etc)    [] Hemodialysis    [] Other -    [] Change in code status    [x] Decision to escalate care    [] Major surgery/procedure with associated risk factors    --------------------------------------------------  C. Data (any 2)    [x] Data Review (any 3)    [x] Consultant notes from yesterday/today    [x] All available current labs reviewed interpreted for clinical significance    [x] Appropriate follow-up labs were ordered  [x] Collateral history obtained     [x] Independent Interpretation of tests (any 1)    [x] Telemetry (Rhythm Strip) personally reviewed and interpreted        [] Imaging personally

## 2025-01-15 NOTE — PLAN OF CARE
CHF Care Plan      Patient's EF (Ejection Fraction) is greater than 40%    Heart Failure Medications:  Diuretics:: Furosemide and Spironolactone    (One of the following REQUIRED for EF </= 40%/SYSTOLIC FAILURE but MAY be used in EF% >40%/DIASTOLIC FAILURE)        ACE:: None        ARB:: Valsartan         ARNI:: None    (Beta Blockers)  NON- Evidenced Based Beta Blocker (for EF% >40%/DIASTOLIC FAILURE): None    Evidenced Based Beta Blocker::(REQUIRED for EF% <40%/SYSTOLIC FAILURE) Metoprolol SUCCinate- Toprol XL  ...................................................................................................................................................    Failed to redirect to the Timeline version of the [employer]ue[employer] SmartLink.      Patient's weights and intake/output reviewed    Daily Weight log at bedside, patient/family participation in use of log: \"yes    Patient's current weight today shows a difference of 1.6 oz less than last documented weight.      Intake/Output Summary (Last 24 hours) at 1/15/2025 1816  Last data filed at 1/15/2025 1701  Gross per 24 hour   Intake 240 ml   Output 1050 ml   Net -810 ml       Education Booklet Provided: yes    Comorbidities Reviewed Yes    Patient has a past medical history of A-fib (HCC), Acute congestive heart failure (HCC), Anxiety, Asthma, Atrial fibrillation (HCC), BPH, CHF (congestive heart failure) (HCC), Dermatitis due to sun, Hearing loss, Hyperlipidemia, Hypertension, Narcolepsy, Osteoarthritis, Pneumonia, Sleep apnea, Superficial peroneal nerve neuropathy, and Urinary incontinence.     >>For CHF and Comorbidity documentation on Education Time and Topics, please see Education Tab      CHF Education    Learners:  Patient and Family  Readineess:   Acceptance  Method:   Explanation and Handout  Response:    Verbalizes Understanding and Needs Reinforcement    Comments: diuretic education and healthy diet reviewed    Time Spent: 10      Pt sitting in bed at this time on  room air. Pt denies shortness of breath. Pt with pitting lower extremity edema.     Patient and/or Family's stated Goal of Care this Admission: reduce shortness of breath, increase activity tolerance, better understand heart failure and disease management, be more comfortable, and reduce lower extremity edema prior to discharge        :

## 2025-01-15 NOTE — PROGRESS NOTES
4 Eyes Skin Assessment     NAME:  Nick Hawkins  YOB: 1955  MEDICAL RECORD NUMBER:  [id number]    The patient is being assessed for  Admission    I agree that at least one RN has performed a thorough Head to Toe Skin Assessment on the patient. ALL assessment sites listed below have been assessed.      Areas assessed by both nurses:    Head, Face, Ears, Shoulders, Back, Chest, Arms, Elbows, Hands, Sacrum. Buttock, Coccyx, Ischium, Legs. Feet and Heels, and Under Medical Devices         Does the Patient have a Wound? No noted wound(s)       Manolo Prevention initiated by RN: No  Wound Care Orders initiated by RN: No    Pressure Injury (Stage 3,4, Unstageable, DTI, NWPT, and Complex wounds) if present, place Wound referral order by RN under : No    New Ostomies, if present place, Ostomy referral order under : No     Nurse 1 eSignature: Electronically signed by Amanda Mccullough RN on 1/14/25 at 6:45PM EST    **SHARE this note so that the co-signing nurse can place an eSignature**    Nurse 2 eSignature: Electronically signed by Mayank Salcido RN on 1/15/25 at 3:56 AM EST

## 2025-01-15 NOTE — CARE COORDINATION
Case Management Assessment  Initial Evaluation    Date/Time of Evaluation: 1/15/2025 1:22 PM  Assessment Completed by: Sonali Slater RN    If patient is discharged prior to next notation, then this note serves as note for discharge by case management.    Patient Name: Nick Hawkins                   YOB: 1955  Diagnosis: Chest pain [R07.9]  Status post cardiac catheterization [Z98.890]  STEMI involving left anterior descending coronary artery (HCC) [I21.02]                   Date / Time: 1/14/2025  1:34 PM    Patient Admission Status: Inpatient   Readmission Risk (Low < 19, Mod (19-27), High > 27): Readmission Risk Score: 8.9    Current PCP: Cardiology, [facility]  PCP verified by CM? Yes (yesterday)    Chart Reviewed: Yes      History Provided by: Patient, Medical Record  Patient Orientation: Alert and Oriented, Person, Place, Situation, Self    Patient Cognition: Alert    Hospitalization in the last 30 days (Readmission):  No    If yes, Readmission Assessment in  Navigator will be completed.    Advance Directives:      Code Status: Full Code   Patient's Primary Decision Maker is: Patient Declined (Legal Next of Kin Remains as Decision Maker)    Primary Decision Maker: Amber Hawkins - Spouse - [phone]    Discharge Planning:    Patient lives with: Spouse/Significant Other Type of Home: Trailer/Mobile Home  Primary Care Giver: Self  Patient Support Systems include: Spouse/Significant Other, Family Members   Current Financial resources: Medicare  Current community resources: None  Current services prior to admission: C-pap            Current DME:              Type of Home Care services:  None    ADLS  Prior functional level: Independent in ADLs/IADLs  Current functional level: Independent in ADLs/IADLs    PT AM-PAC:   /24  OT AM-PAC:   /24    Family can provide assistance at DC: Yes  Would you like Case Management to discuss the discharge plan with any other family members/significant others, and if so,

## 2025-01-15 NOTE — PROGRESS NOTES
Pharmacy Note  Warfarin Consult  Dx: Afib  Goal INR range 2-3  Home Warfarin dose: 7.5 mg Mon and Wed, 5 mg all other days      Date                 INR                  Warfarin   1/14                1.58                    2.5 mg (patient reports taking 5 mg already today)  1/15                1.74                    5 mg     Recommend Warfarin 5 mg tonight x1.  Daily INR ordered.  Rx will continue to manage therapy per consult order.  Osmar Turner PharmD 1/15/2025  6:40 AM

## 2025-01-15 NOTE — PLAN OF CARE
Problem: Chronic Conditions and Co-morbidities  Goal: Patient's chronic conditions and co-morbidity symptoms are monitored and maintained or improved  1/15/2025 0842 by Gloria Fitzgerald RN  Outcome: Progressing  1/14/2025 2051 by Mayank Salcido RN  Outcome: Progressing     Problem: Discharge Planning  Goal: Discharge to home or other facility with appropriate resources  1/15/2025 0842 by Gloria Fitzgerald RN  Outcome: Progressing  1/14/2025 2051 by Mayank Salcido RN  Outcome: Progressing     Problem: Safety - Adult  Goal: Free from fall injury  1/15/2025 0842 by Gloria Fitzgerald RN  Outcome: Progressing  1/14/2025 2051 by Mayank Salcido RN  Outcome: Progressing     Problem: ABCDS Injury Assessment  Goal: Absence of physical injury  1/15/2025 0842 by Gloria Fitzgerald RN  Outcome: Progressing  1/14/2025 2051 by Mayank Salcido RN  Outcome: Progressing     Problem: Pain  Goal: Verbalizes/displays adequate comfort level or baseline comfort level  Outcome: Progressing

## 2025-01-16 ENCOUNTER — ANCILLARY PROCEDURE (OUTPATIENT)
Dept: CARDIOLOGY CLINIC | Age: 70
End: 2025-01-16

## 2025-01-16 ENCOUNTER — TELEPHONE (OUTPATIENT)
Dept: FAMILY MEDICINE CLINIC | Age: 70
End: 2025-01-16

## 2025-01-16 ENCOUNTER — TELEPHONE (OUTPATIENT)
Dept: CARDIAC CATH/INVASIVE PROCEDURES | Age: 70
End: 2025-01-16

## 2025-01-16 VITALS
BODY MASS INDEX: 44.1 KG/M2 | SYSTOLIC BLOOD PRESSURE: 121 MMHG | RESPIRATION RATE: 18 BRPM | TEMPERATURE: 98.4 F | HEART RATE: 56 BPM | OXYGEN SATURATION: 95 % | HEIGHT: 71 IN | WEIGHT: 315 LBS | DIASTOLIC BLOOD PRESSURE: 78 MMHG

## 2025-01-16 DIAGNOSIS — I48.0 PAROXYSMAL ATRIAL FIBRILLATION (HCC): ICD-10-CM

## 2025-01-16 LAB
ECHO BSA: 2.76 M2
INR PPP: 1.87 (ref 0.85–1.15)
PROTHROMBIN TIME: 21.6 SEC (ref 11.9–14.9)

## 2025-01-16 PROCEDURE — 2500000003 HC RX 250 WO HCPCS: Performed by: INTERNAL MEDICINE

## 2025-01-16 PROCEDURE — 99233 SBSQ HOSP IP/OBS HIGH 50: CPT | Performed by: NURSE PRACTITIONER

## 2025-01-16 PROCEDURE — 6370000000 HC RX 637 (ALT 250 FOR IP): Performed by: INTERNAL MEDICINE

## 2025-01-16 PROCEDURE — 6370000000 HC RX 637 (ALT 250 FOR IP): Performed by: NURSE PRACTITIONER

## 2025-01-16 PROCEDURE — 36415 COLL VENOUS BLD VENIPUNCTURE: CPT

## 2025-01-16 PROCEDURE — 85610 PROTHROMBIN TIME: CPT

## 2025-01-16 RX ORDER — VALSARTAN 80 MG/1
80 TABLET ORAL DAILY
Qty: 30 TABLET | Refills: 3 | Status: SHIPPED | OUTPATIENT
Start: 2025-01-17 | End: 2025-01-23 | Stop reason: SINTOL

## 2025-01-16 RX ORDER — WARFARIN SODIUM 5 MG/1
5 TABLET ORAL
Status: DISCONTINUED | OUTPATIENT
Start: 2025-01-16 | End: 2025-01-16 | Stop reason: HOSPADM

## 2025-01-16 RX ORDER — ATORVASTATIN CALCIUM 80 MG/1
80 TABLET, FILM COATED ORAL NIGHTLY
Qty: 30 TABLET | Refills: 3 | Status: SHIPPED | OUTPATIENT
Start: 2025-01-16

## 2025-01-16 RX ORDER — METOPROLOL SUCCINATE 25 MG/1
25 TABLET, EXTENDED RELEASE ORAL DAILY
Qty: 30 TABLET | Refills: 3 | Status: SHIPPED | OUTPATIENT
Start: 2025-01-17

## 2025-01-16 RX ORDER — ASPIRIN 81 MG/1
81 TABLET, CHEWABLE ORAL DAILY
Qty: 30 TABLET | Refills: 3 | Status: SHIPPED | OUTPATIENT
Start: 2025-01-17

## 2025-01-16 RX ORDER — SPIRONOLACTONE 25 MG/1
25 TABLET ORAL DAILY
Qty: 30 TABLET | Refills: 3 | Status: SHIPPED | OUTPATIENT
Start: 2025-01-17

## 2025-01-16 RX ORDER — TORSEMIDE 10 MG/1
10 TABLET ORAL DAILY
Qty: 30 TABLET | Refills: 3 | Status: SHIPPED | OUTPATIENT
Start: 2025-01-17

## 2025-01-16 RX ORDER — HYDROCODONE BITARTRATE AND ACETAMINOPHEN 5; 325 MG/1; MG/1
2 TABLET ORAL EVERY 6 HOURS PRN
Qty: 24 TABLET | Refills: 0 | Status: SHIPPED | OUTPATIENT
Start: 2025-01-16 | End: 2025-01-19

## 2025-01-16 RX ORDER — DILTIAZEM HYDROCHLORIDE 240 MG/1
240 CAPSULE, COATED, EXTENDED RELEASE ORAL DAILY
Qty: 30 CAPSULE | Refills: 3 | Status: SHIPPED | OUTPATIENT
Start: 2025-01-17

## 2025-01-16 RX ADMIN — SODIUM CHLORIDE, PRESERVATIVE FREE 10 ML: 5 INJECTION INTRAVENOUS at 08:58

## 2025-01-16 RX ADMIN — METOPROLOL SUCCINATE 25 MG: 25 TABLET, FILM COATED, EXTENDED RELEASE ORAL at 08:56

## 2025-01-16 RX ADMIN — DULOXETINE HYDROCHLORIDE 60 MG: 60 CAPSULE, DELAYED RELEASE ORAL at 08:55

## 2025-01-16 RX ADMIN — Medication 100 MG: at 08:56

## 2025-01-16 RX ADMIN — ROPINIROLE HYDROCHLORIDE 2 MG: 2 TABLET, FILM COATED ORAL at 08:56

## 2025-01-16 RX ADMIN — ROPINIROLE HYDROCHLORIDE 2 MG: 2 TABLET, FILM COATED ORAL at 13:37

## 2025-01-16 RX ADMIN — Medication 1 TABLET: at 08:56

## 2025-01-16 RX ADMIN — VALSARTAN 80 MG: 80 TABLET, FILM COATED ORAL at 08:57

## 2025-01-16 RX ADMIN — SPIRONOLACTONE 25 MG: 25 TABLET ORAL at 08:56

## 2025-01-16 RX ADMIN — DILTIAZEM HYDROCHLORIDE 240 MG: 240 CAPSULE, EXTENDED RELEASE ORAL at 08:57

## 2025-01-16 RX ADMIN — TORSEMIDE 10 MG: 20 TABLET ORAL at 08:57

## 2025-01-16 RX ADMIN — ASPIRIN 81 MG: 81 TABLET, CHEWABLE ORAL at 08:55

## 2025-01-16 RX ADMIN — TAMSULOSIN HYDROCHLORIDE 0.4 MG: 0.4 CAPSULE ORAL at 08:56

## 2025-01-16 ASSESSMENT — PAIN DESCRIPTION - LOCATION
LOCATION: NECK
LOCATION: NECK

## 2025-01-16 ASSESSMENT — PAIN SCALES - GENERAL
PAINLEVEL_OUTOF10: 1
PAINLEVEL_OUTOF10: 1

## 2025-01-16 NOTE — TELEPHONE ENCOUNTER
Please assist with scheduling this patient as a new patient with EP for A fib and s/p 30 day VC monitor per NPDE.  Thank You.

## 2025-01-16 NOTE — DISCHARGE INSTRUCTIONS
Lab work: BMP next week (Nonfasting lab)    Referral has been placed to [facility]. They should reach out to you to set up an appointment. You need to have it checked on Monday or Tuesday next week. Please call them Monday if you do not hear from them regarding an appointment.    Please call [facility] and have them forward your records to Dr. Ministerio Erickson.           Follow up with Cardiology as OP, Coumadin Clinic for dosage, F/U for Pain Mgt

## 2025-01-16 NOTE — PROGRESS NOTES
CHF Care Plan      Patient's EF (Ejection Fraction) is greater than 40%    Heart Failure Medications:  Diuretics:: Furosemide and Spironolactone    (One of the following REQUIRED for EF </= 40%/SYSTOLIC FAILURE but MAY be used in EF% >40%/DIASTOLIC FAILURE)        ACE:: None        ARB:: Valsartan         ARNI:: None    (Beta Blockers)  NON- Evidenced Based Beta Blocker (for EF% >40%/DIASTOLIC FAILURE): None    Evidenced Based Beta Blocker::(REQUIRED for EF% <40%/SYSTOLIC FAILURE) Metoprolol SUCCinate- Toprol XL  ...................................................................................................................................................    Failed to redirect to the Timeline version of the [employer] SmartLink.      Patient's weights and intake/output reviewed    Daily Weight log at bedside, patient/family participation in use of log: \"yes    Patient's current weight today shows a difference of 8 lbs less than last documented weight.      Intake/Output Summary (Last 24 hours) at 1/16/2025 0614  Last data filed at 1/16/2025 0508  Gross per 24 hour   Intake 480 ml   Output 1900 ml   Net -1420 ml       Education Booklet Provided: yes    Comorbidities Reviewed Yes    Patient has a past medical history of A-fib (HCC), Acute congestive heart failure (HCC), Anxiety, Asthma, Atrial fibrillation (HCC), BPH, CHF (congestive heart failure) (HCC), Dermatitis due to sun, Hearing loss, Hyperlipidemia, Hypertension, Narcolepsy, Osteoarthritis, Pneumonia, Sleep apnea, Superficial peroneal nerve neuropathy, and Urinary incontinence.     >>For CHF and Comorbidity documentation on Education Time and Topics, please see Education Tab      CHF Education    Learners:  Patient  Readineess:   Acceptance  Method:   Explanation  Response:    Verbalizes Understanding    Comments: none    Time Spent: 10 minutes      Pt resting in bed at this time on room air. Pt denies shortness of breath. Pt with pitting lower extremity edema.      Patient and/or Family's stated Goal of Care this Admission: reduce shortness of breath, increase activity tolerance, better understand heart failure and disease management, be more comfortable, and reduce lower extremity edema prior to discharge        :

## 2025-01-16 NOTE — DISCHARGE SUMMARY
Hospital Medicine Discharge Summary    Patient: Nick Hawkins   : 1955     Hospital:  [facility]  Admit Date: 2025   Discharge Date:   ***  Disposition:  []Home   []HHC  []SNF  []Acute Rehab  []LTAC  []Hospice  Code status:  []Full  []DNR/CCA  []Limited (DNR/CCA with Do Not Intubate)  []DNRCC  Condition at Discharge: Stable  Primary Care Provider: Imani Sahu APRN - CNP    Admitting Provider: Erik Ware MD  Discharge Provider: JASEN Burrell CNP     Discharge Diagnoses:      Active Hospital Problems    Diagnosis     Status post cardiac catheterization [Z98.890]      Priority: High    Chest pain [R07.9]     Chronic heart failure with preserved ejection fraction (HFpEF) (HCC) [I50.32]     Ascending aorta dilatation (HCC) [I77.810]     Essential hypertension [I10]     Abnormal EKG [R94.31]     STEMI involving left anterior descending coronary artery (HCC) [I21.02]        Presenting Admission History:      ***     Assessment/Plan:      ***    Physical Exam Performed:      /78   Pulse 56   Temp 98.4 °F (36.9 °C) (Oral)   Resp 18   Ht 1.803 m (5' 11\")   Wt (!) 148.6 kg (327 lb 11.2 oz)   SpO2 95%   BMI 45.70 kg/m²       General appearance:  No apparent distress, appears stated age and cooperative.  Respiratory:  Normal respiratory effort.   Cardiovascular:  Regular rate and rhythm.  Abdomen:  Soft, non-tender, non-distended.  Musculoskeletal:  No edema  Neurologic:  Non-focal  Psychiatric:  Alert and oriented    Patient Discharge Instructions:      Follow up:    1.  Primary Care Provider Imani Sahu APRN - CNP in the next 1-2 weeks.      The patient was seen and examined on day of discharge and this discharge summary is in conjunction with any daily progress note from day of discharge. Time spent on discharge: 3*** minutes in the examination, evaluation, counseling and review of medications and discharge  plan.    ------------------------------------------------------------------------------------------------------------------------------------------------------    Discharge Medications:   Current Discharge Medication List        Current Discharge Medication List        Current Discharge Medication List        CONTINUE these medications which have NOT CHANGED    Details   vitamin B-1 (THIAMINE) 100 MG tablet Take 1 tablet by mouth daily      docusate sodium (COLACE) 100 MG capsule Take 1 capsule by mouth 2 times daily      torsemide (DEMADEX) 10 MG tablet Take 1 tablet by mouth daily Take as needed      HYDROcodone-acetaminophen (NORCO) 5-325 MG per tablet Take 2 tablets by mouth every 6 hours as needed for Pain.      rOPINIRole (REQUIP) 2 MG tablet Take 1 tablet by mouth 4 times daily      tamsulosin (FLOMAX) 0.4 MG capsule Take 1 capsule by mouth 2 times daily  Qty: 180 capsule, Refills: 1    Associated Diagnoses: Frequent urination      atorvastatin (LIPITOR) 20 MG tablet Take 1 tablet by mouth daily  Qty: 90 tablet, Refills: 1      warfarin (COUMADIN) 5 MG tablet Take 1 tablet by mouth daily  Qty: 90 tablet, Refills: 1      dilTIAZem (CARDIZEM CD) 360 MG extended release capsule Take 1 capsule by mouth daily  Qty: 90 capsule, Refills: 1      sildenafil (VIAGRA) 50 MG tablet Take 1 tablet by mouth as needed for Erectile Dysfunction  Qty: 30 tablet, Refills: 1      ipratropium-albuterol (DUONEB) 0.5-2.5 (3) MG/3ML SOLN nebulizer solution INHALE THE CONTENTS OF 1 VIAL VIA NEBULIZER EVERY 4 HOURS  Qty: 720 mL, Refills: 3    Associated Diagnoses: Mild intermittent asthma without complication      albuterol sulfate HFA (VENTOLIN HFA) 108 (90 Base) MCG/ACT inhaler Inhale 2 puffs into the lungs 4 times daily as needed for Wheezing  Qty: 18 g, Refills: 5    Associated Diagnoses: Mild intermittent asthma without complication      Multiple Vitamin (THERA) TABS Take 1 tablet by mouth daily      SAW PALMETTO Take  by mouth

## 2025-01-16 NOTE — CARE COORDINATION
IPTA . Plans to return home with family support. Denies needs and hopes to be discharged home today. Wife at bedside.

## 2025-01-16 NOTE — TELEPHONE ENCOUNTER
2/14 @ noon SZK. Scheduled in [facility]. Appt will print out on patient's hospital dc paperwork.

## 2025-01-16 NOTE — PLAN OF CARE
Problem: Chronic Conditions and Co-morbidities  Goal: Patient's chronic conditions and co-morbidity symptoms are monitored and maintained or improved  1/16/2025 0719 by Gloria Fitzgerald RN  Outcome: Progressing  1/15/2025 2207 by Yuri Bobby RN  Outcome: Progressing     Problem: Discharge Planning  Goal: Discharge to home or other facility with appropriate resources  1/16/2025 0719 by Gloria Fitzgerald RN  Outcome: Progressing  1/15/2025 2207 by Yuri Bobby RN  Outcome: Progressing     Problem: Safety - Adult  Goal: Free from fall injury  1/16/2025 0719 by Gloria Fitzgerald RN  Outcome: Progressing  1/15/2025 2207 by Yuri Bobby RN  Outcome: Progressing     Problem: ABCDS Injury Assessment  Goal: Absence of physical injury  1/16/2025 0719 by Gloria Fitzgerald RN  Outcome: Progressing  1/15/2025 2207 by Yuri Bobby RN  Outcome: Progressing     Problem: Pain  Goal: Verbalizes/displays adequate comfort level or baseline comfort level  1/16/2025 0719 by Gloria Fitzgerald RN  Outcome: Progressing  1/15/2025 2207 by Yuri Bobby RN  Outcome: Progressing

## 2025-01-16 NOTE — PLAN OF CARE
Problem: Chronic Conditions and Co-morbidities  Goal: Patient's chronic conditions and co-morbidity symptoms are monitored and maintained or improved  1/15/2025 2207 by Yuri Bobby RN  Outcome: Progressing  1/15/2025 0842 by Gloria Fitzgerald RN  Outcome: Progressing     Problem: Discharge Planning  Goal: Discharge to home or other facility with appropriate resources  1/15/2025 2207 by Yuri Bobby RN  Outcome: Progressing  1/15/2025 0842 by Gloria Fitzgerald RN  Outcome: Progressing     Problem: Safety - Adult  Goal: Free from fall injury  1/15/2025 2207 by Yuri Bobby RN  Outcome: Progressing  1/15/2025 0842 by Gloria Fitzgerald RN  Outcome: Progressing     Problem: ABCDS Injury Assessment  Goal: Absence of physical injury  1/15/2025 2207 by Yuri Bobby RN  Outcome: Progressing  1/15/2025 0842 by Gloria Fitzgerald RN  Outcome: Progressing     Problem: Pain  Goal: Verbalizes/displays adequate comfort level or baseline comfort level  1/15/2025 2207 by Yuri Bobby RN  Outcome: Progressing  1/15/2025 0842 by Gloria Fitzgerald RN  Outcome: Progressing

## 2025-01-16 NOTE — PROGRESS NOTES
Patient IV's and telemonitor removed for discharge. [facility] notified. Discharge paperwork reviewed with patient and wife. Zoll event monitor applied and education provided. No further questions. Medications picked up at outpatient pharmacy. Wheeled out to vehicle

## 2025-01-16 NOTE — PROGRESS NOTES
[facility]   Daily Progress Note      Admit Date:  1/14/2025    Reason for follow up visit:     CC: \"I feel a lot better.\"    68 y/o male with PMH notable for PAF and S/P prior DCCV and on chronic warfarin, CHF, HTN, NICOLE and on BiPap nightly, HLD, narcolepsy admitted with chest pain and abnormal ECG. Presented to [facility] ED with CP and inferior ST segment changes on ECG. Transferred to [facility] on heparin, Brilinta and ASA. Taken for urgent cardia catheterization on 1/14/25 and demonstrated to have normal coronaries and deemed noncardiac chest pain.Received call from nurse this AM stating patient was having significant chest pain and concerned and asked to revisit.   PAF noted on telemetry and c/o chest heaviness/pain, palpitations and L leg swelling and pain. Has not had INR followed since November 2024. He underwent CTPA which was negative for PE; pulmonary nodule noted.     Primary Cardiologist: Dr. Erickson    Interval History:  Pt. seen and examined; records reviewed  Net diuresis -2.4 L since admit; received IV lasix yesterday  Wt today 327# (down from 334#)  BP stable. On room air  Denies chest pain, palpitations, dizziness or cough  SOB much improved and LE edema improving  + generalized arthritic pain  On warfarin: will follow up at [facility] coag clinic    Subjective:  Pt with no acute overnight cardiac events.     Objective:   /71   Pulse 59   Temp 98.1 °F (36.7 °C) (Oral)   Resp 18   Ht 1.803 m (5' 11\")   Wt (!) 148.6 kg (327 lb 11.2 oz)   SpO2 93%   BMI 45.70 kg/m²     Intake/Output Summary (Last 24 hours) at 1/16/2025 0919  Last data filed at 1/16/2025 0904  Gross per 24 hour   Intake 480 ml   Output 2200 ml   Net -1720 ml     Wt Readings from Last 3 Encounters:   01/16/25 (!) 148.6 kg (327 lb 11.2 oz)   01/14/25 (!) 151.9 kg (334 lb 14.4 oz)   01/14/25 (!) 152 kg (335 lb)       Physical Exam:  General: In no acute distress. Awake, alert, and oriented X4. Up in chair visiting with his

## 2025-01-16 NOTE — PLAN OF CARE
CHF Care Plan      Patient's EF (Ejection Fraction) is greater than 40%    Heart Failure Medications:  Diuretics:: Torsemide and Spironolactone    (One of the following REQUIRED for EF </= 40%/SYSTOLIC FAILURE but MAY be used in EF% >40%/DIASTOLIC FAILURE)        ACE:: None        ARB:: Valsartan         ARNI:: None    (Beta Blockers)  NON- Evidenced Based Beta Blocker (for EF% >40%/DIASTOLIC FAILURE): None    Evidenced Based Beta Blocker::(REQUIRED for EF% <40%/SYSTOLIC FAILURE) Metoprolol SUCCinate- Toprol XL  ...................................................................................................................................................    Failed to redirect to the Timeline version of the [employer] SmartLink.      Patient's weights and intake/output reviewed    Daily Weight log at bedside, patient/family participation in use of log: \"yes    Patient's current weight today shows a difference of 7 lbs 4.8 ozless than last documented weight.      Intake/Output Summary (Last 24 hours) at 1/16/2025 1131  Last data filed at 1/16/2025 0904  Gross per 24 hour   Intake 480 ml   Output 2200 ml   Net -1720 ml       Education Booklet Provided: yes    Comorbidities Reviewed Yes    Patient has a past medical history of A-fib (HCC), Acute congestive heart failure (HCC), Anxiety, Asthma, Atrial fibrillation (HCC), BPH, CHF (congestive heart failure) (HCC), Dermatitis due to sun, Hearing loss, Hyperlipidemia, Hypertension, Narcolepsy, Osteoarthritis, Pneumonia, Sleep apnea, Superficial peroneal nerve neuropathy, and Urinary incontinence.     >>For CHF and Comorbidity documentation on Education Time and Topics, please see Education Tab      CHF Education    Learners:  Patient and Family  Readineess:   Acceptance  Method:   Explanation  Response:    Verbalizes Understanding and Needs Reinforcement    Comments: discussed medication uses    Time Spent: 10 minutes      Pt up in chair at this time on room air. Pt denies

## 2025-01-16 NOTE — TELEPHONE ENCOUNTER
Monitor company [employer]  Length of monitor 30 days  Monitor ordered by Dr. Tai Estrella/Diane Enzweiler CNP    Patch ID [id number]  Device number [id number]  Monitor given to patient  Activation successful prior to pt leaving hospital? Yes  Instructions given to patient and his wife.  Both verbalized understanding.  All questions answered to the best of my ability.

## 2025-01-17 ENCOUNTER — CARE COORDINATION (OUTPATIENT)
Dept: CASE MANAGEMENT | Age: 70
End: 2025-01-17

## 2025-01-17 NOTE — CARE COORDINATION
also noted in system. Wife was notified that labs were ordered and expected by 1/21/25. Stated understanding. Confirmed cardiac event monitor was in place. Wife has reviewed education packet included with monitor and has number to contact device provider if needed.  CTN offered to schedule HFU with PCP, but wife declined. Discussed cardiology scheduled and would follow up as scheduled in Feb. Message routed to provider's office to assist with scheduling if needed.   Denied any acute needs at present time.  Agreeable to f/u calls.  Educated on the use of urgent care or physician’s 24 hr access line if assistance is needed after hours.     Care Transition Nurse reviewed discharge instructions, medical action plan, and red flags with spouse/partner. The spouse/partner was given an opportunity to ask questions; questions regarding HFU sent to provider for clarification.. The spouse/partner verbalized understanding.   Were discharge instructions available to patient? Yes.   Reviewed appropriate site of care based on symptoms and resources available to patient including: PCP  Specialist  When to call 911. The spouse/partner agrees to contact the primary care provider and/or specialist office for questions related to their healthcare.      Advance Care Planning:   Does patient have an Advance Directive: reviewed and current.  Advance Directives:       Code Status: Full Code   Patient's Primary Decision Maker is: Patient Declined (Legal Next of Kin Remains as Decision Maker)    Primary Decision Maker: Amber Hawkins - Spouse - [phone]  Medication Reconciliation:  Medication reconciliation was not performed during this call, wife declined to review.     Remote Patient Monitoring:  Offered patient enrollment in the Remote Patient Monitoring (RPM) program for in-home monitoring: Deferred at this time because patient currently has cardiac event monitor in place; will discuss at next outreach.    Assessments:  Care

## 2025-01-17 NOTE — TELEPHONE ENCOUNTER
01/17 1st attempt to contact pt to confirm 02/28/25 OV, LVM w/ OV time and date. Requested return call to confirm

## 2025-01-20 ENCOUNTER — ANTI-COAG VISIT (OUTPATIENT)
Dept: PHARMACY | Age: 70
End: 2025-01-20
Payer: MEDICARE

## 2025-01-20 DIAGNOSIS — I48.19 PERSISTENT ATRIAL FIBRILLATION (HCC): Primary | ICD-10-CM

## 2025-01-20 LAB
INTERNATIONAL NORMALIZATION RATIO, POC: 1.6
PROTHROMBIN TIME, POC: 0

## 2025-01-20 PROCEDURE — 99211 OFF/OP EST MAY X REQ PHY/QHP: CPT | Performed by: PHARMACIST

## 2025-01-20 PROCEDURE — 85610 PROTHROMBIN TIME: CPT | Performed by: PHARMACIST

## 2025-01-20 NOTE — PROGRESS NOTES
Mr. Naeem Hawkins is here for management of anticoagulation for AFib.   PMH also significant for CHF, HLD, HTN.   He presents today w/out complaint.  Pt verifies dosing regimen as listed above.   Pt denies s/s bleeding/bruising/swelling/SOB.  No BRBPR. No melena.  Address missed doses  Reviewed pt medication list  No changes in RX/OTCs/Herbal medications.  Reviewed dietary concerns  Address EToH and tobacco use.    Patient had previously been managed here at clinic, moved to [place] in .  Patient recently moved back to [facility] from [place].  Was in hospital last week and INR had been running low at around 1.7    Previous dose had been 5 mg daily except 7.5 mg Q Mon/Wed.  Appears to have received this while admitted then has taken 5 mg daily since discharge.    INR remains low today at 1.6.  will make small boost today and increase weekly dose. Recheck in one week.      INR 1.6 is within therapeutic range of 2-3.  Recommend to take 10 mg today, then increase dose to 7.5 mg Mon/Wed/Fri and 5 mg all other days   Patient has 5 mg tablets.  Will continue to monitor and check INR in 1 weeks.  Dosing reminder card given with phone number, appointment date and time.   Return to clinic: 25 @  3:00 pm    Joshua StephensonD 9:37 AM EST 25    For Pharmacy Admin Tracking Only    Intervention Detail: Adherence Monitorin and Dose Adjustment: 1, reason: Therapy Optimization  Total # of Interventions Recommended: 2  Total # of Interventions Accepted: 2  Time Spent (min): 15

## 2025-01-20 NOTE — PROGRESS NOTES
Physician Progress Note      PATIENT:               MELISSA BEE  CSN #:                  [id number]  :                       1955  ADMIT DATE:       2025 1:34 PM  DISCH DATE:        2025 2:30 PM  RESPONDING  PROVIDER #:        Erik Ware MD          QUERY TEXT:    Pt admitted with chest pain, and dizziness.  Pt noted to have Afib. LHC done   concluding none CAD chest pain. If possible, please document in progress notes   and discharge summary if you are evaluating and/or treating any of the   following:    The medical record reflects the following:  Risk Factors: CHF , A-fib on coumadin  Clinical Indicators: trop 26, PER heart cath note, 25, \"Normal   Coronaries, Normal left heart catheterization, Normal left ventricular   function\"  Treatment: LHC, ECHO, Chest x-ray, Chest CT, cardiology consult, EKG, serial   labs  Options provided:  -- Chest pain likely due to A-fib  -- Chest pain due to costochondritis  -- Other - I will add my own diagnosis  -- Disagree - Not applicable / Not valid  -- Disagree - Clinically unable to determine / Unknown  -- Refer to Clinical Documentation Reviewer    PROVIDER RESPONSE TEXT:    This patient has chest pain due to costochondritis.    Query created by: Keri Mckeon on 2025 5:43 PM      Electronically signed by:  Erik Ware MD 2025 6:18 PM

## 2025-01-23 ENCOUNTER — OFFICE VISIT (OUTPATIENT)
Dept: FAMILY MEDICINE CLINIC | Age: 70
End: 2025-01-23

## 2025-01-23 ENCOUNTER — HOSPITAL ENCOUNTER (OUTPATIENT)
Age: 70
Discharge: HOME | End: 2025-01-23
Payer: MEDICARE

## 2025-01-23 ENCOUNTER — HOSPITAL ENCOUNTER (OUTPATIENT)
Dept: GENERAL RADIOLOGY | Age: 70
Discharge: HOME | End: 2025-01-23
Payer: MEDICARE

## 2025-01-23 VITALS
OXYGEN SATURATION: 93 % | BODY MASS INDEX: 45.19 KG/M2 | DIASTOLIC BLOOD PRESSURE: 65 MMHG | WEIGHT: 315 LBS | TEMPERATURE: 98.3 F | HEART RATE: 72 BPM | SYSTOLIC BLOOD PRESSURE: 100 MMHG

## 2025-01-23 DIAGNOSIS — M25.562 ACUTE PAIN OF LEFT KNEE: ICD-10-CM

## 2025-01-23 DIAGNOSIS — Z79.01 CHRONIC ANTICOAGULATION: ICD-10-CM

## 2025-01-23 DIAGNOSIS — I48.19 PERSISTENT ATRIAL FIBRILLATION (HCC): ICD-10-CM

## 2025-01-23 DIAGNOSIS — I21.02 STEMI INVOLVING LEFT ANTERIOR DESCENDING CORONARY ARTERY (HCC): ICD-10-CM

## 2025-01-23 DIAGNOSIS — R91.8 PULMONARY NODULES: ICD-10-CM

## 2025-01-23 DIAGNOSIS — Z09 HOSPITAL DISCHARGE FOLLOW-UP: Primary | ICD-10-CM

## 2025-01-23 PROCEDURE — 73560 X-RAY EXAM OF KNEE 1 OR 2: CPT

## 2025-01-23 ASSESSMENT — PATIENT HEALTH QUESTIONNAIRE - PHQ9
SUM OF ALL RESPONSES TO PHQ QUESTIONS 1-9: 0
SUM OF ALL RESPONSES TO PHQ QUESTIONS 1-9: 0
SUM OF ALL RESPONSES TO PHQ9 QUESTIONS 1 & 2: 0
2. FEELING DOWN, DEPRESSED OR HOPELESS: NOT AT ALL
SUM OF ALL RESPONSES TO PHQ QUESTIONS 1-9: 0
1. LITTLE INTEREST OR PLEASURE IN DOING THINGS: NOT AT ALL
SUM OF ALL RESPONSES TO PHQ QUESTIONS 1-9: 0

## 2025-01-23 NOTE — PROGRESS NOTES
Post-Discharge Transitional Care  Follow Up      Nick Hawkins   YOB: 1955    Date of Office Visit:  1/23/2025  Date of Hospital Admission: 1/14/25  Date of Hospital Discharge: 1/16/25  Risk of hospital readmission (high >=14%. Medium >=10%) :Readmission Risk Score: 9.5      Care management risk score Rising risk (score 2-5) and Complex Care (Scores >=6): No Risk Score On File     Non face to face  following discharge, date last encounter closed (first attempt may have been earlier): 01/17/2025    Call initiated 2 business days of discharge: Yes    ASSESSMENT/PLAN:   Hospital discharge follow-up  -     [place] DISCHARGE MEDS RECONCILED W/ CURRENT OUTPATIENT MED LIST  Persistent atrial fibrillation (HCC)  Chronic anticoagulation  Pulmonary nodules  STEMI involving left anterior descending coronary artery (HCC)  Acute pain of left knee  -     XR KNEE LEFT (3 VIEWS); Future  Stable.  Patient presents today for hospital follow-up.  Patient recently r admitted on 1/14-1/16 for STEMI.  Patient reported chest pain that radiates to his left arm and neck.  Patient underwent angiogram, however it was determined to be noncardiac and no stent placement.  Patient denies any further episodes of chest pain.  Patient continues to have A-fib and is on Coumadin which is managed by the Coumadin clinic.  Patient's wife reports concerns with low blood pressure and dizziness.  He does appear that patient was placed on spironolactone, metoprolol, and valsartan during recent hospitalization.  Patient continues to take diltiazem for A-fib.  We did discuss concerns with hypotension and medications.  Patient will be taken off valsartan at this time with strict return precautions.  Patient's wife advised on checking blood pressure daily call in the office with any abnormal readings or concerns.  Patient has follow-up appointment with cardiologist in the next few weeks.  Patient counseled on proper hydration and monitoring of

## 2025-01-23 NOTE — PATIENT INSTRUCTIONS
Please read the healthy family handout that you were given and share it with your family.       Please compare this printed medication list with your medications at home to be sure they are the same.  If you have any medications that are different please contact us immediately at [phone].     Also review your allergies that we have listed, these may also include medications that you have not been able to tolerate, make sure everything listed is correct. If you have any allergies that are different please contact us immediately at [phone].     You may receive a survey in the mail or by email asking about your experience during your visit today. Please complete and return to us so we know how we are serving you.

## 2025-01-24 ENCOUNTER — CARE COORDINATION (OUTPATIENT)
Dept: CASE MANAGEMENT | Age: 70
End: 2025-01-24

## 2025-01-24 NOTE — CARE COORDINATION
Follow Up Appointment:   Reviewed upcoming appointment(s). and MUNDO appointment attended as scheduled   Future Appointments         Provider Specialty Dept Phone    1/27/2025 3:00 PM [facility] [phone]    2/10/2025 2:15 PM Nicholas Erickson MD Cardiology [phone]    2/28/2025 12:00 PM Cailin Rodriguez DO Cardiology [phone]    3/11/2025 1:45 PM Shorty Armenta MD Cardiology [phone]            Care Transition Nurse provided contact information.  Plan for follow-up call in 6-10 days based on severity of symptoms and risk factors.  Plan for next call: self management-       Marguerite Mendez RN

## 2025-01-27 ENCOUNTER — TELEPHONE (OUTPATIENT)
Dept: PULMONOLOGY | Age: 70
End: 2025-01-27

## 2025-01-27 ENCOUNTER — TELEPHONE (OUTPATIENT)
Dept: CASE MANAGEMENT | Age: 70
End: 2025-01-27

## 2025-01-27 ENCOUNTER — ANTI-COAG VISIT (OUTPATIENT)
Dept: PHARMACY | Age: 70
End: 2025-01-27
Payer: MEDICARE

## 2025-01-27 DIAGNOSIS — I48.19 PERSISTENT ATRIAL FIBRILLATION (HCC): Primary | ICD-10-CM

## 2025-01-27 DIAGNOSIS — R91.1 PULMONARY NODULE: Primary | ICD-10-CM

## 2025-01-27 LAB
INTERNATIONAL NORMALIZATION RATIO, POC: 2.9
PROTHROMBIN TIME, POC: 0

## 2025-01-27 PROCEDURE — 99211 OFF/OP EST MAY X REQ PHY/QHP: CPT | Performed by: PHARMACIST

## 2025-01-27 PROCEDURE — 85610 PROTHROMBIN TIME: CPT | Performed by: PHARMACIST

## 2025-01-27 NOTE — TELEPHONE ENCOUNTER
1/15/25 CT Chest    There is a 7 mm nodule of the right apex     Per protocol Pulmonology Referral pended to chart  Pt at higher risk with smoking history.  Please choose either [facility] or [facility] office and delete the other.    Jasmine TAO(R)  Patient Navigator  Incidentals/Lung Navigation  [email or web address]

## 2025-01-27 NOTE — PROGRESS NOTES
Mr. Naeem Hawkins is here for management of anticoagulation for AFib.   PMH also significant for CHF, HLD, HTN.   He presents today w/out complaint.  Pt verifies dosing regimen as listed above.   Pt denies s/s bleeding/bruising/swelling/SOB.  No BRBPR. No melena.  Address missed doses  Reviewed pt medication list  No changes in RX/OTCs/Herbal medications.  Reviewed dietary concerns  Address EToH and tobacco use.    Patient had previously been managed here at clinic, moved to [place] in .  Patient recently moved back to town from [place].    Stopped valsartan due to low blood pressure. No other changes since last week.  Confirms correct dose.      INR 2.9 is within therapeutic range of 2-3.  Recommend to continue dose of 7.5 mg Mon/Wed/Fri and 5 mg all other days   Patient has 5 mg tablets.  Will continue to monitor and check INR in 2 weeks.  Dosing reminder card given with phone number, appointment date and time.   Return to clinic: 25 @  2:30 pm    Bruno Siddiqi PharmD 3:02 PM EST 25    For Pharmacy Admin Tracking Only    Intervention Detail: Adherence Monitorin  Total # of Interventions Recommended: 1  Total # of Interventions Accepted: 1  Time Spent (min): 15

## 2025-01-28 ENCOUNTER — TELEPHONE (OUTPATIENT)
Dept: FAMILY MEDICINE CLINIC | Age: 70
End: 2025-01-28

## 2025-01-28 NOTE — TELEPHONE ENCOUNTER
Blood pressures are where they need to be.  Please verify that patient is not taking valsartan (we d/c at hospital follow up).  Please make sure that patient is drinking plenty of fluids to stay hydrated as this can cause some dizziness as well.

## 2025-01-28 NOTE — TELEPHONE ENCOUNTER
Wife calling with bp readings since Friday.  She said he has still been having dizziness every day   123/80  125/80  106/62  110/68  112/74

## 2025-01-29 ENCOUNTER — OFFICE VISIT (OUTPATIENT)
Dept: PULMONOLOGY | Age: 70
End: 2025-01-29
Payer: MEDICARE

## 2025-01-29 VITALS
DIASTOLIC BLOOD PRESSURE: 64 MMHG | WEIGHT: 315 LBS | HEIGHT: 70 IN | OXYGEN SATURATION: 93 % | RESPIRATION RATE: 17 BRPM | HEART RATE: 49 BPM | BODY MASS INDEX: 45.1 KG/M2 | SYSTOLIC BLOOD PRESSURE: 98 MMHG

## 2025-01-29 DIAGNOSIS — G47.33 OSA AND COPD OVERLAP SYNDROME (HCC): Primary | ICD-10-CM

## 2025-01-29 DIAGNOSIS — E66.01 MORBID (SEVERE) OBESITY DUE TO EXCESS CALORIES: ICD-10-CM

## 2025-01-29 DIAGNOSIS — J44.9 OSA AND COPD OVERLAP SYNDROME (HCC): Primary | ICD-10-CM

## 2025-01-29 PROCEDURE — 99204 OFFICE O/P NEW MOD 45 MIN: CPT | Performed by: INTERNAL MEDICINE

## 2025-01-29 PROCEDURE — 3078F DIAST BP <80 MM HG: CPT | Performed by: INTERNAL MEDICINE

## 2025-01-29 PROCEDURE — 3017F COLORECTAL CA SCREEN DOC REV: CPT | Performed by: INTERNAL MEDICINE

## 2025-01-29 PROCEDURE — 3023F SPIROM DOC REV: CPT | Performed by: INTERNAL MEDICINE

## 2025-01-29 PROCEDURE — 1036F TOBACCO NON-USER: CPT | Performed by: INTERNAL MEDICINE

## 2025-01-29 PROCEDURE — 3074F SYST BP LT 130 MM HG: CPT | Performed by: INTERNAL MEDICINE

## 2025-01-29 PROCEDURE — G8417 CALC BMI ABV UP PARAM F/U: HCPCS | Performed by: INTERNAL MEDICINE

## 2025-01-29 PROCEDURE — 1111F DSCHRG MED/CURRENT MED MERGE: CPT | Performed by: INTERNAL MEDICINE

## 2025-01-29 PROCEDURE — 1159F MED LIST DOCD IN RCRD: CPT | Performed by: INTERNAL MEDICINE

## 2025-01-29 PROCEDURE — G8427 DOCREV CUR MEDS BY ELIG CLIN: HCPCS | Performed by: INTERNAL MEDICINE

## 2025-01-29 PROCEDURE — 1124F ACP DISCUSS-NO DSCNMKR DOCD: CPT | Performed by: INTERNAL MEDICINE

## 2025-01-29 ASSESSMENT — SLEEP AND FATIGUE QUESTIONNAIRES
HOW LIKELY ARE YOU TO NOD OFF OR FALL ASLEEP WHILE SITTING AND READING: HIGH CHANCE OF DOZING
HOW LIKELY ARE YOU TO NOD OFF OR FALL ASLEEP WHILE SITTING QUIETLY AFTER LUNCH WITHOUT ALCOHOL: HIGH CHANCE OF DOZING
HOW LIKELY ARE YOU TO NOD OFF OR FALL ASLEEP WHILE SITTING AND TALKING TO SOMEONE: HIGH CHANCE OF DOZING
HOW LIKELY ARE YOU TO NOD OFF OR FALL ASLEEP WHEN YOU ARE A PASSENGER IN A CAR FOR AN HOUR WITHOUT A BREAK: HIGH CHANCE OF DOZING
HOW LIKELY ARE YOU TO NOD OFF OR FALL ASLEEP WHILE WATCHING TV: HIGH CHANCE OF DOZING
ESS TOTAL SCORE: 24
HOW LIKELY ARE YOU TO NOD OFF OR FALL ASLEEP IN A CAR, WHILE STOPPED FOR A FEW MINUTES IN TRAFFIC: HIGH CHANCE OF DOZING
HOW LIKELY ARE YOU TO NOD OFF OR FALL ASLEEP WHILE SITTING INACTIVE IN A PUBLIC PLACE: HIGH CHANCE OF DOZING
HOW LIKELY ARE YOU TO NOD OFF OR FALL ASLEEP WHILE LYING DOWN TO REST IN THE AFTERNOON WHEN CIRCUMSTANCES PERMIT: HIGH CHANCE OF DOZING

## 2025-01-29 NOTE — PROGRESS NOTES
P  [facility]                                               Pulmonary Clinic Consult     I had the pleasure of seeing  Nick Hawkins     Chief Complaint   Patient presents with    New Patient     Urgent referral       HISTORY OF PRESENT ILLNESS:    Nick Hawkins is a 69 y.o. year old  who smoke for 30 years ,60 PPY smoking history       The Patient comes in with SOB that has been going on the last few years  Associated with cough He  states that it get worse with exercise or walking long distance and he can walk 1/2 block  And go less than 1  flight of stairs before get short winded      Had Ct scana nd shows RUL and comparing with 2023     While had ACS had CT and no stent    Known NICOLE and use CPAP ,    Has it from friend         ALLERGIES:    Allergies   Allergen Reactions    Celebrex [Celecoxib]      Nosebleeds    Elavil [Amitriptyline Hcl]      Nose bleed    Topamax      Leg cramps       PAST MEDICAL HISTORY:       Diagnosis Date    A-fib (HCC)     Acute congestive heart failure (HCC) 02/17/2023    Anxiety     Asthma     Atrial fibrillation (HCC)     BPH     prostate 5/05,     CHF (congestive heart failure) (HCC)     Dermatitis due to sun     severe    Hearing loss     decreased hearing left ear    Hyperlipidemia     Hypertension     pos GXT 3/01, neg GXT 3/07    Narcolepsy     Osteoarthritis     Pneumonia     Sleep apnea     bipap    Superficial peroneal nerve neuropathy     bilateral    Urinary incontinence 01/01/2024       MEDICATIONS:   Current Outpatient Medications   Medication Sig Dispense Refill    aspirin 81 MG chewable tablet Take 1 tablet by mouth daily 30 tablet 3    atorvastatin (LIPITOR) 80 MG tablet Take 1 tablet by mouth nightly 30 tablet 3    dilTIAZem (CARDIZEM CD) 240 MG extended release capsule Take 1 capsule by mouth daily 30 capsule 3    metoprolol succinate (TOPROL XL) 25 MG extended release tablet Take 1 tablet by mouth daily 30 tablet 3

## 2025-01-31 ENCOUNTER — CARE COORDINATION (OUTPATIENT)
Dept: CASE MANAGEMENT | Age: 70
End: 2025-01-31

## 2025-01-31 NOTE — CARE COORDINATION
Care Transitions Note    Follow Up Call     Patient Current Location:  Home: [address] [place] [address]  [address]  [place] [address]    Care Transition Nurse contacted the spouse/partner  by telephone. Verified name and  as identifiers.    Additional needs identified to be addressed with provider   No needs identified         Method of communication with provider: none.    Care Summary Note: spoke to wife, Amber- HIPAA verified- retired CMA.   Wife answered call and verified . Pleasant and agreeable to transition call. Patient is coaching on line during call, but wife agreed to call. 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   Patient is doing better and no further episodes of dizziness. Patient is increasing his activity and denied any further falls. Discussed f/u with PCP and scheduled visits noted in system. Wife aware of date/times in system. Denied any acute needs at present time.  Agreeable to f/u calls.  Educated on the use of urgent care or physician’s 24 hr access line if assistance is needed after hours.    Plan of care updates since last contact:  Review of patient management of conditions/medications:         Advance Care Planning:   Does patient have an Advance Directive: reviewed during previous call, see note. .    Medication Review:  Full medication reconciliation completed during previous call.    Remote Patient Monitoring:  Offered patient enrollment in the Remote Patient Monitoring (RPM) program for in-home monitoring: Yes, but did not enroll at this time: controlled chronic disease management and already monitoring with home equipment.    Assessments:  Care Transitions Subsequent and Final Call    Subsequent and Final Calls  Care Transitions Interventions  Other Interventions:              Follow Up Appointment:   Reviewed upcoming appointment(s). and MUNDO appointment attended as scheduled

## 2025-02-07 ENCOUNTER — CARE COORDINATION (OUTPATIENT)
Dept: CASE MANAGEMENT | Age: 70
End: 2025-02-07

## 2025-02-07 NOTE — CARE COORDINATION
Care Transitions Note    Follow Up Call     Patient Current Location:  Home: [address] [place] [address]  [address]  [facility] [address]    [facility] Care Coordinator contacted the spouse/partner  by telephone. Verified name and  as identifiers.    Additional needs identified to be addressed with provider   No needs identified                 Method of communication with provider: none.    Care Summary Note:       Plan of care updates since last contact:  Review of patient management of conditions/medications:         Advance Care Planning:   Does patient have an Advance Directive: reviewed during previous call, see note. .    Medication Review:  No changes since last call.     Remote Patient Monitoring:  Offered patient enrollment in the Remote Patient Monitoring (RPM) program for in-home monitoring: Deferred at this time because monitoring with home equipment; will discuss at next outreach.    Assessments:  Care Transitions Subsequent and Final Call    Subsequent and Final Calls  Do you have any ongoing symptoms?: No  Have your medications changed?: No  Do you have any questions related to your medications?: No  Do you currently have any active services?: No  Do you have any needs or concerns that I can assist you with?: No  Identified Barriers: None  Care Transitions Interventions  Other Interventions:              Follow Up Appointment:   Reviewed upcoming appointment(s). and MUNDO appointment attended as scheduled   Future Appointments         Provider Specialty Dept Phone    2/10/2025 2:15 PM Nicholas Erickson MD Cardiology [phone]    2025 2:30 PM [facility] Pharmacy [phone]    2025 12:00 PM Cailin Rodriguez DO Cardiology [phone]    3/11/2025 1:45 PM Shorty Armenta MD Cardiology [phone]    2025 8:30 AM (Arrive by 8:00 AM) [facility] CT MAIN Radiology [phone]    2025 9:00 AM [facility] PULMONARY FUNCTION TESTING Pulmonary Function Testing [phone]    2025 10:15 AM Al

## 2025-02-10 ENCOUNTER — OFFICE VISIT (OUTPATIENT)
Dept: CARDIOLOGY CLINIC | Age: 70
End: 2025-02-10
Payer: MEDICARE

## 2025-02-10 VITALS
SYSTOLIC BLOOD PRESSURE: 88 MMHG | BODY MASS INDEX: 44.1 KG/M2 | OXYGEN SATURATION: 97 % | DIASTOLIC BLOOD PRESSURE: 58 MMHG | HEART RATE: 71 BPM | WEIGHT: 315 LBS | HEIGHT: 71 IN

## 2025-02-10 DIAGNOSIS — R42 DIZZINESS: ICD-10-CM

## 2025-02-10 DIAGNOSIS — I48.19 PERSISTENT ATRIAL FIBRILLATION (HCC): ICD-10-CM

## 2025-02-10 DIAGNOSIS — R53.83 OTHER FATIGUE: ICD-10-CM

## 2025-02-10 DIAGNOSIS — R06.02 SOB (SHORTNESS OF BREATH) ON EXERTION: Primary | ICD-10-CM

## 2025-02-10 DIAGNOSIS — Z87.891 HISTORY OF TOBACCO ABUSE: ICD-10-CM

## 2025-02-10 DIAGNOSIS — E78.5 HYPERLIPIDEMIA, UNSPECIFIED HYPERLIPIDEMIA TYPE: ICD-10-CM

## 2025-02-10 DIAGNOSIS — I50.32 CHRONIC HEART FAILURE WITH PRESERVED EJECTION FRACTION (HFPEF) (HCC): ICD-10-CM

## 2025-02-10 PROCEDURE — 1124F ACP DISCUSS-NO DSCNMKR DOCD: CPT | Performed by: INTERNAL MEDICINE

## 2025-02-10 PROCEDURE — 99214 OFFICE O/P EST MOD 30 MIN: CPT | Performed by: INTERNAL MEDICINE

## 2025-02-10 PROCEDURE — 3017F COLORECTAL CA SCREEN DOC REV: CPT | Performed by: INTERNAL MEDICINE

## 2025-02-10 PROCEDURE — 3078F DIAST BP <80 MM HG: CPT | Performed by: INTERNAL MEDICINE

## 2025-02-10 PROCEDURE — 1036F TOBACCO NON-USER: CPT | Performed by: INTERNAL MEDICINE

## 2025-02-10 PROCEDURE — G8417 CALC BMI ABV UP PARAM F/U: HCPCS | Performed by: INTERNAL MEDICINE

## 2025-02-10 PROCEDURE — 3074F SYST BP LT 130 MM HG: CPT | Performed by: INTERNAL MEDICINE

## 2025-02-10 PROCEDURE — G8427 DOCREV CUR MEDS BY ELIG CLIN: HCPCS | Performed by: INTERNAL MEDICINE

## 2025-02-10 PROCEDURE — 1159F MED LIST DOCD IN RCRD: CPT | Performed by: INTERNAL MEDICINE

## 2025-02-10 PROCEDURE — 1111F DSCHRG MED/CURRENT MED MERGE: CPT | Performed by: INTERNAL MEDICINE

## 2025-02-10 RX ORDER — DILTIAZEM HYDROCHLORIDE 240 MG/1
240 CAPSULE, COATED, EXTENDED RELEASE ORAL DAILY
Qty: 90 CAPSULE | Refills: 1 | Status: SHIPPED | OUTPATIENT
Start: 2025-02-10

## 2025-02-10 RX ORDER — SPIRONOLACTONE 25 MG/1
25 TABLET ORAL DAILY
Qty: 90 TABLET | Refills: 1 | Status: SHIPPED | OUTPATIENT
Start: 2025-02-10

## 2025-02-10 RX ORDER — ATORVASTATIN CALCIUM 80 MG/1
80 TABLET, FILM COATED ORAL NIGHTLY
Qty: 90 TABLET | Refills: 1 | Status: SHIPPED | OUTPATIENT
Start: 2025-02-10

## 2025-02-10 RX ORDER — TORSEMIDE 10 MG/1
10 TABLET ORAL PRN
Qty: 30 TABLET | Refills: 3 | Status: SHIPPED | OUTPATIENT
Start: 2025-02-10

## 2025-02-10 RX ORDER — ASPIRIN 81 MG/1
81 TABLET, CHEWABLE ORAL DAILY
Qty: 30 TABLET | Refills: 3 | Status: CANCELLED | OUTPATIENT
Start: 2025-02-10

## 2025-02-10 RX ORDER — METOPROLOL SUCCINATE 25 MG/1
25 TABLET, EXTENDED RELEASE ORAL DAILY
Qty: 90 TABLET | Refills: 1 | Status: SHIPPED | OUTPATIENT
Start: 2025-02-10

## 2025-02-10 NOTE — PATIENT INSTRUCTIONS
Plan:  Current medications reviewed.  Refills given as warranted.  Labs reviewed  Upon assessment today you are in normal sinus rhythm ~ continue wearing cardiac event monitor  Continue as planned with consultation with  for paroxysmal atrial fibrillation   Discontinue nitroglycerin  ~    Educated not to take Viagra in conjunction with Nitroglycerin as contraindicated within 24 hours of one another.   Discontinue aspirin   Orthostatic blood pressure in clinic today   If dizziness continues with medication changes today please call the office to further discuss   Change torsemide 10 mg as needed    Continue as planned with consultations with  for leg swelling   Follow up with me in 6 month

## 2025-02-10 NOTE — PROGRESS NOTES
[facility]   Cardiac Consultation    Referring Provider:  Imani Sahu APRN - CNP     Chief Complaint   Patient presents with    New Patient    Atrial Fibrillation    Congestive Heart Failure    Hypertension    Fatigue    Palpitations    Dizziness     If he bends over     Shortness of Breath     If he moves too fast       Subjective: Mr. Hawkins is here today for cardiology follow up; c/o dizziness w/ position changes and fatigue today    History of Present Illness:   Nick Hawkins is a 70 y.o. male  who has PMH afib dx 2/23 s/p CV in [place] 2023 on coumadin ([facility] coumadin clinic), HTN, HLD, RBBB, prior tob abuse (quit 2000), mild COPD (Dr. Cooper), NICOLE on Bipap, obesity, anxiety, and BPH. Admitted with new afib, PNA, and CHF in 2/23. EKG AFib  bpm, RBBB (SB in 8/16). Received 40mg IV lasix and atenolol. Taken off atenolol due to bradycardia. Reports having electrical CV in [place] 2023 and converted after 3rd attempt. No known afib recurrence up until admission 1/25 per his report.   Moved to [place] after 6/23 OV but now back in [place]. Since last OV admitted to [facility] 1/14-1/16/25 with c/o CP. EKG 1/14/25 with NSR 62bpm; RBBB; ST elevation consider early repol vs pericarditis vs STEMI. Given CP and EKG he was sent emergently for LHC 1/14/2025 with  which showed normal coronaries without evidence for STEMI. Echo 1/15/25 EF 55-60% (same 2/23), mild AR.  PAF noted on telemetry and c/o chest heaviness/pain, palpitations and L leg swelling and pain. CTPA 01/15/2025 was negative for PE. He was d/c'd with 30 day JOSEMANUEL. Last INR 01/27/25 2.9  Today he reports doing OK overall. He reports while in [place] he had 3 cardioversion for atrial fibrillation. No known afib since CV in [place] up until admit 1/25 per his knowledge. He reports fatigue and dizziness with position changes. Patient currently denies any weight gain, edema, palpitations, chest pain, and syncope. He is taking

## 2025-02-12 ENCOUNTER — ANTI-COAG VISIT (OUTPATIENT)
Dept: PHARMACY | Age: 70
End: 2025-02-12
Payer: MEDICARE

## 2025-02-12 DIAGNOSIS — I48.19 PERSISTENT ATRIAL FIBRILLATION (HCC): Primary | ICD-10-CM

## 2025-02-12 LAB
INTERNATIONAL NORMALIZATION RATIO, POC: 2.9
PROTHROMBIN TIME, POC: 0

## 2025-02-12 PROCEDURE — 85610 PROTHROMBIN TIME: CPT | Performed by: PHARMACIST

## 2025-02-12 PROCEDURE — 99211 OFF/OP EST MAY X REQ PHY/QHP: CPT | Performed by: PHARMACIST

## 2025-02-12 NOTE — PROGRESS NOTES
Mr. Naeem Hawkins is here for management of anticoagulation for AFib.   PMH also significant for CHF, HLD, HTN.   He presents today w/out complaint.  Pt verifies dosing regimen as listed above.   Pt denies s/s bleeding/bruising/swelling/SOB.  No BRBPR. No melena.  Address missed doses  Reviewed pt medication list  No changes in RX/OTCs/Herbal medications.  Reviewed dietary concerns  Address EToH and tobacco use.    Patient had previously been managed here at clinic, moved to [place] in .  Patient recently moved back to town from [place].    Stopped aspirin recently.    INR remains in range today. No other changes.      INR 2.9 is within therapeutic range of 2-3.  Recommend to continue dose of 7.5 mg Mon/Wed/Fri and 5 mg all other days   Patient has 5 mg tablets.  Will continue to monitor and check INR in 3 weeks.  Dosing reminder card given with phone number, appointment date and time.   Return to clinic: 3/10/25 @  2:30 pm    Bruno Siddiqi, PharmD 2:04 PM EST 25      For Pharmacy Admin Tracking Only    Intervention Detail: Adherence Monitorin  Total # of Interventions Recommended: 1  Total # of Interventions Accepted: 1  Time Spent (min): 15

## 2025-02-13 ENCOUNTER — CARE COORDINATION (OUTPATIENT)
Dept: CASE MANAGEMENT | Age: 70
End: 2025-02-13

## 2025-02-13 NOTE — CARE COORDINATION
agreed to contact primary care provider and/or specialist for any further questions, concerns, or needs.    Marguerite Mendez RN

## 2025-02-24 LAB — ECHO BSA: 2.76 M2

## 2025-02-28 ENCOUNTER — OFFICE VISIT (OUTPATIENT)
Dept: CARDIOLOGY CLINIC | Age: 70
End: 2025-02-28

## 2025-02-28 VITALS
DIASTOLIC BLOOD PRESSURE: 58 MMHG | OXYGEN SATURATION: 97 % | HEART RATE: 69 BPM | HEIGHT: 71 IN | BODY MASS INDEX: 44.1 KG/M2 | SYSTOLIC BLOOD PRESSURE: 100 MMHG | WEIGHT: 315 LBS

## 2025-02-28 DIAGNOSIS — I47.29 NSVT (NONSUSTAINED VENTRICULAR TACHYCARDIA) (HCC): Primary | ICD-10-CM

## 2025-02-28 DIAGNOSIS — I48.0 PAROXYSMAL ATRIAL FIBRILLATION (HCC): ICD-10-CM

## 2025-02-28 DIAGNOSIS — I48.0 PAF (PAROXYSMAL ATRIAL FIBRILLATION) (HCC): ICD-10-CM

## 2025-02-28 DIAGNOSIS — I45.10 RBBB: ICD-10-CM

## 2025-02-28 DIAGNOSIS — I47.10 PSVT (PAROXYSMAL SUPRAVENTRICULAR TACHYCARDIA): ICD-10-CM

## 2025-02-28 NOTE — PROGRESS NOTES
CARDIAC ELECTROPHYSIOLOGY CONSULT NOTE  Date: 2/28/25  Patient Name: Nick Hawkins  YOB: 1955    Primary Care Physician: Imani Sahu APRN - CNP    Referring Physician: Diane Enzweiler, CNP     CHIEF COMPLAINT:   Chief Complaint   Patient presents with    New Patient    Atrial Fibrillation    Other     RBBB    Dizziness    Shortness of Breath        Nick Hawkins was seen today on 2/28/2025 in consultation due to chief complaint of atrial fibrillation and NSVT    Patient is a 70 y.o. male with PMH of HTN, CHF, RBBB, ascending aorta dilatation, COPD, NICOLE, and CKD who was seen today for atrial fibrillation and NSVT. Patient wore a cardiac event monitor 1/16/2025-2/16/2025 which demonstrated predominately SR 69 () BPM, <10% PAC burden, <1% PVC burden, pSVT x 14 beats in duration. NSVT x 7 beats in duration, no AF. Most recent echo 1/2025 demonstrated an LVEF of 55-60%.      Today, patient presents with his wife. He reports intermittent palpitations, lightheaded and ANGULO. He reports he has experienced near syncope in the past but not recently. Orthostatics today in office did demonstrated a significant drop in blood pressure with position change. He reports his father had a history of CAD and CVA. Patient is taking all cardiac medications as prescribed. Patient denies current edema, chest pain, or syncope.     ALLERGIES:   Allergies   Allergen Reactions    Celebrex [Celecoxib]      Nosebleeds    Elavil [Amitriptyline Hcl]      Nose bleed    Topamax      Leg cramps        MEDICATIONS:   Current Outpatient Medications   Medication Sig Dispense Refill    atorvastatin (LIPITOR) 80 MG tablet Take 1 tablet by mouth nightly 90 tablet 1    metoprolol succinate (TOPROL XL) 25 MG extended release tablet Take 1 tablet by mouth daily 90 tablet 1    spironolactone (ALDACTONE) 25 MG tablet Take 1 tablet by mouth daily 90 tablet 1    torsemide (DEMADEX) 10 MG tablet Take 1 tablet by mouth as needed (swelling)

## 2025-02-28 NOTE — PATIENT INSTRUCTIONS
PLAN:   Stop diltiazem.   Discussed starting sotalol. This would require a 3-4 day hospitalization. March 17th at 9AM. Please come to the [facility] registration desk once you receive a bed assignment. If you do not receive a bed assignment by 8am that morning, call our office.   Cardiac MRI.   Repeat 30 day cardiac event monitor in 4 months.   Follow up with me in 6 months.   Thank you for allowing me to participate in the care of this patient. If you have any further questions please feel free to contact me.    Your provider has ordered testing for further evaluation.  An order/prescription has been included in your paper work.   To schedule outpatient testing, contact Central Scheduling by calling [address] [place] [address] ([phone]).

## 2025-03-07 NOTE — PROGRESS NOTES
Interventional Cardiology Consultation     Nick ALVAREZ Ross  1955    PCP: Imani Sahu APRN - CNP  Referring Physician: Imani Sahu APRN - CNP  Reason for Referral: LE edema  Chief Complaint: \"my legs swell\"  Chief Complaint   Patient presents with    New Patient    Edema     Lower left extremity        Subjective:   History of Present Illness: The patient is 70 y.o. male with a past medical history significant for pAF on coumadin, HFpEF, HTN, HLD, RBBB, previous tobacco abuse, and NICOLE on bipap. Patient follows with Dr. Unger's for primary cardiology. Presents today as new referral for further evaluation of lower extremity edema. Reports worsening edema of bilateral extremities over the last ~2 years. Worked on his feet for over 50 years. Worsening varicosities of bilateral ankles. Has tried diuretics without improvement in symptoms. He has tried compression therapy with no improvement in symptoms and states they were painful so he stopped wearing them. States up until a month ago he was walking ~1 mile daily with his dogs. However unable to be as active due to swelling. Reports injury to his leg a few years ago which caused him to not be as active as he used to be.          Past Medical History:   Diagnosis Date    A-fib (HCC)     Acute congestive heart failure (HCC) 02/17/2023    Anxiety     Asthma     Atrial fibrillation (HCC)     BPH     prostate 5/05,     CHF (congestive heart failure) (HCC)     Dermatitis due to sun     severe    Hearing loss     decreased hearing left ear    Hyperlipidemia     Hypertension     pos GXT 3/01, neg GXT 3/07    Narcolepsy     Osteoarthritis     Pneumonia     Sleep apnea     bipap    Superficial peroneal nerve neuropathy     bilateral    Urinary incontinence 01/01/2024     Past Surgical History:   Procedure Laterality Date    APPENDECTOMY      CARDIAC CATH PROCEDURE  1/14/2025    CARDIAC PROCEDURE N/A 1/14/2025    Left heart cath / coronary angiography

## 2025-03-10 ENCOUNTER — ANTI-COAG VISIT (OUTPATIENT)
Dept: PHARMACY | Age: 70
End: 2025-03-10
Payer: MEDICARE

## 2025-03-10 DIAGNOSIS — I48.19 PERSISTENT ATRIAL FIBRILLATION (HCC): Primary | ICD-10-CM

## 2025-03-10 LAB
INTERNATIONAL NORMALIZATION RATIO, POC: 3.1
PROTHROMBIN TIME, POC: 0

## 2025-03-10 PROCEDURE — 99211 OFF/OP EST MAY X REQ PHY/QHP: CPT | Performed by: PHARMACIST

## 2025-03-10 PROCEDURE — 85610 PROTHROMBIN TIME: CPT | Performed by: PHARMACIST

## 2025-03-10 NOTE — PROGRESS NOTES
Mr. Naeem Hawkins is here for management of anticoagulation for AFib.   PMH also significant for CHF, HLD, HTN.   He presents today w/out complaint.  Pt verifies dosing regimen as listed above.   Pt denies s/s bleeding/bruising/swelling/SOB.  No BRBPR. No melena.  Address missed doses  Reviewed pt medication list  No changes in RX/OTCs/Herbal medications.  Reviewed dietary concerns  Address EToH and tobacco use.    Patient had previously been managed here at clinic, moved to [place] in .  Patient recently moved back to [facility] from [place].    Recently stopped diltiazem due to low blood pressure.    Will be admitted to hospital 3/17-3/20 for initiation of sotalol.        INR 3.1 is slightly above therapeutic range of 2-3.  Recommend to continue dose of 7.5 mg Mon/Wed/Fri and 5 mg all other days   Patient has 5 mg tablets.  Will continue to monitor and check INR in 2 weeks.  Dosing reminder card given with phone number, appointment date and time.   Return to clinic: 3/24/25 @  2:45 pm    Bruno Siddiqi PharmD 2:35 PM EDT 3/10/25      For Pharmacy Admin Tracking Only    Intervention Detail: Adherence Monitorin  Total # of Interventions Recommended: 1  Total # of Interventions Accepted: 1  Time Spent (min): 15

## 2025-03-11 ENCOUNTER — OFFICE VISIT (OUTPATIENT)
Dept: CARDIOLOGY CLINIC | Age: 70
End: 2025-03-11
Payer: MEDICARE

## 2025-03-11 VITALS
BODY MASS INDEX: 44.1 KG/M2 | WEIGHT: 315 LBS | HEIGHT: 71 IN | HEART RATE: 57 BPM | OXYGEN SATURATION: 94 % | DIASTOLIC BLOOD PRESSURE: 78 MMHG | SYSTOLIC BLOOD PRESSURE: 106 MMHG

## 2025-03-11 DIAGNOSIS — I87.2 VENOUS INSUFFICIENCY: Primary | ICD-10-CM

## 2025-03-11 DIAGNOSIS — R60.0 BILATERAL LOWER EXTREMITY EDEMA: ICD-10-CM

## 2025-03-11 PROCEDURE — 1124F ACP DISCUSS-NO DSCNMKR DOCD: CPT | Performed by: INTERNAL MEDICINE

## 2025-03-11 PROCEDURE — 3017F COLORECTAL CA SCREEN DOC REV: CPT | Performed by: INTERNAL MEDICINE

## 2025-03-11 PROCEDURE — G8417 CALC BMI ABV UP PARAM F/U: HCPCS | Performed by: INTERNAL MEDICINE

## 2025-03-11 PROCEDURE — G8427 DOCREV CUR MEDS BY ELIG CLIN: HCPCS | Performed by: INTERNAL MEDICINE

## 2025-03-11 PROCEDURE — 3074F SYST BP LT 130 MM HG: CPT | Performed by: INTERNAL MEDICINE

## 2025-03-11 PROCEDURE — 1159F MED LIST DOCD IN RCRD: CPT | Performed by: INTERNAL MEDICINE

## 2025-03-11 PROCEDURE — 1036F TOBACCO NON-USER: CPT | Performed by: INTERNAL MEDICINE

## 2025-03-11 PROCEDURE — 3078F DIAST BP <80 MM HG: CPT | Performed by: INTERNAL MEDICINE

## 2025-03-11 PROCEDURE — 99214 OFFICE O/P EST MOD 30 MIN: CPT | Performed by: INTERNAL MEDICINE

## 2025-03-11 RX ORDER — OXYBUTYNIN CHLORIDE 5 MG/1
5 TABLET, EXTENDED RELEASE ORAL DAILY
COMMUNITY

## 2025-03-17 ENCOUNTER — TELEPHONE (OUTPATIENT)
Dept: CARDIOLOGY CLINIC | Age: 70
End: 2025-03-17

## 2025-03-17 ENCOUNTER — HOSPITAL ENCOUNTER (INPATIENT)
Age: 70
LOS: 3 days | Discharge: HOME | DRG: 309 | End: 2025-03-20
Attending: INTERNAL MEDICINE | Admitting: INTERNAL MEDICINE
Payer: MEDICARE

## 2025-03-17 DIAGNOSIS — I48.0 PAROXYSMAL ATRIAL FIBRILLATION (HCC): Primary | ICD-10-CM

## 2025-03-17 DIAGNOSIS — M54.50 CHRONIC LOW BACK PAIN, UNSPECIFIED BACK PAIN LATERALITY, UNSPECIFIED WHETHER SCIATICA PRESENT: ICD-10-CM

## 2025-03-17 DIAGNOSIS — G89.29 CHRONIC LOW BACK PAIN, UNSPECIFIED BACK PAIN LATERALITY, UNSPECIFIED WHETHER SCIATICA PRESENT: ICD-10-CM

## 2025-03-17 PROBLEM — I47.20 VENTRICULAR TACHYARRHYTHMIA (HCC): Status: ACTIVE | Noted: 2025-03-17

## 2025-03-17 LAB
ANION GAP SERPL CALCULATED.3IONS-SCNC: 13 MMOL/L (ref 3–16)
BASOPHILS # BLD: 0.1 K/UL (ref 0–0.2)
BASOPHILS NFR BLD: 1.2 %
BUN SERPL-MCNC: 35 MG/DL (ref 7–20)
CALCIUM SERPL-MCNC: 9.7 MG/DL (ref 8.3–10.6)
CHLORIDE SERPL-SCNC: 108 MMOL/L (ref 99–110)
CO2 SERPL-SCNC: 18 MMOL/L (ref 21–32)
CREAT SERPL-MCNC: 1.4 MG/DL (ref 0.8–1.3)
DEPRECATED RDW RBC AUTO: 16.5 % (ref 12.4–15.4)
EKG DIAGNOSIS: NORMAL
EKG DIAGNOSIS: NORMAL
EKG Q-T INTERVAL: 402 MS
EKG Q-T INTERVAL: 436 MS
EKG QRS DURATION: 144 MS
EKG QRS DURATION: 152 MS
EKG QTC CALCULATION (BAZETT): 467 MS
EKG QTC CALCULATION (BAZETT): 472 MS
EKG R AXIS: -29 DEGREES
EKG R AXIS: -40 DEGREES
EKG T AXIS: -3 DEGREES
EKG T AXIS: -6 DEGREES
EKG VENTRICULAR RATE: 69 BPM
EKG VENTRICULAR RATE: 83 BPM
EOSINOPHIL # BLD: 0.1 K/UL (ref 0–0.6)
EOSINOPHIL NFR BLD: 1.8 %
GFR SERPLBLD CREATININE-BSD FMLA CKD-EPI: 53 ML/MIN/{1.73_M2}
GLUCOSE SERPL-MCNC: 93 MG/DL (ref 70–99)
HCT VFR BLD AUTO: 42.5 % (ref 40.5–52.5)
HGB BLD-MCNC: 14.3 G/DL (ref 13.5–17.5)
INR PPP: 2.18 (ref 0.85–1.15)
LYMPHOCYTES # BLD: 1.1 K/UL (ref 1–5.1)
LYMPHOCYTES NFR BLD: 14.4 %
MAGNESIUM SERPL-MCNC: 2.09 MG/DL (ref 1.8–2.4)
MCH RBC QN AUTO: 29.1 PG (ref 26–34)
MCHC RBC AUTO-ENTMCNC: 33.6 G/DL (ref 31–36)
MCV RBC AUTO: 86.6 FL (ref 80–100)
MONOCYTES # BLD: 0.7 K/UL (ref 0–1.3)
MONOCYTES NFR BLD: 9.3 %
NEUTROPHILS # BLD: 5.4 K/UL (ref 1.7–7.7)
NEUTROPHILS NFR BLD: 73.3 %
PLATELET # BLD AUTO: 200 K/UL (ref 135–450)
PMV BLD AUTO: 8.6 FL (ref 5–10.5)
POTASSIUM SERPL-SCNC: 4.7 MMOL/L (ref 3.5–5.1)
PROTHROMBIN TIME: 24.3 SEC (ref 11.9–14.9)
RBC # BLD AUTO: 4.91 M/UL (ref 4.2–5.9)
SODIUM SERPL-SCNC: 139 MMOL/L (ref 136–145)
TSH SERPL DL<=0.005 MIU/L-ACNC: 0.9 UIU/ML (ref 0.27–4.2)
WBC # BLD AUTO: 7.4 K/UL (ref 4–11)

## 2025-03-17 PROCEDURE — 93010 ELECTROCARDIOGRAM REPORT: CPT | Performed by: INTERNAL MEDICINE

## 2025-03-17 PROCEDURE — 93005 ELECTROCARDIOGRAM TRACING: CPT

## 2025-03-17 PROCEDURE — 51798 US URINE CAPACITY MEASURE: CPT

## 2025-03-17 PROCEDURE — 6370000000 HC RX 637 (ALT 250 FOR IP)

## 2025-03-17 PROCEDURE — 2500000003 HC RX 250 WO HCPCS

## 2025-03-17 PROCEDURE — 6370000000 HC RX 637 (ALT 250 FOR IP): Performed by: INTERNAL MEDICINE

## 2025-03-17 PROCEDURE — 2060000000 HC ICU INTERMEDIATE R&B

## 2025-03-17 PROCEDURE — 99223 1ST HOSP IP/OBS HIGH 75: CPT | Performed by: INTERNAL MEDICINE

## 2025-03-17 PROCEDURE — 1200000000 HC SEMI PRIVATE

## 2025-03-17 PROCEDURE — 36415 COLL VENOUS BLD VENIPUNCTURE: CPT

## 2025-03-17 PROCEDURE — 80048 BASIC METABOLIC PNL TOTAL CA: CPT

## 2025-03-17 PROCEDURE — 85610 PROTHROMBIN TIME: CPT

## 2025-03-17 PROCEDURE — 85025 COMPLETE CBC W/AUTO DIFF WBC: CPT

## 2025-03-17 PROCEDURE — 83735 ASSAY OF MAGNESIUM: CPT

## 2025-03-17 PROCEDURE — 93005 ELECTROCARDIOGRAM TRACING: CPT | Performed by: INTERNAL MEDICINE

## 2025-03-17 PROCEDURE — 84443 ASSAY THYROID STIM HORMONE: CPT

## 2025-03-17 RX ORDER — DOCUSATE SODIUM 100 MG/1
100 CAPSULE, LIQUID FILLED ORAL 2 TIMES DAILY
Status: DISCONTINUED | OUTPATIENT
Start: 2025-03-17 | End: 2025-03-20 | Stop reason: HOSPADM

## 2025-03-17 RX ORDER — ACETAMINOPHEN 325 MG/1
650 TABLET ORAL EVERY 6 HOURS PRN
Status: DISCONTINUED | OUTPATIENT
Start: 2025-03-17 | End: 2025-03-20 | Stop reason: HOSPADM

## 2025-03-17 RX ORDER — SODIUM CHLORIDE 9 MG/ML
INJECTION, SOLUTION INTRAVENOUS PRN
Status: DISCONTINUED | OUTPATIENT
Start: 2025-03-17 | End: 2025-03-20 | Stop reason: HOSPADM

## 2025-03-17 RX ORDER — SOTALOL HYDROCHLORIDE 80 MG/1
80 TABLET ORAL EVERY 12 HOURS
Status: DISCONTINUED | OUTPATIENT
Start: 2025-03-17 | End: 2025-03-20 | Stop reason: HOSPADM

## 2025-03-17 RX ORDER — SODIUM CHLORIDE 0.9 % (FLUSH) 0.9 %
5-40 SYRINGE (ML) INJECTION PRN
Status: DISCONTINUED | OUTPATIENT
Start: 2025-03-17 | End: 2025-03-20 | Stop reason: HOSPADM

## 2025-03-17 RX ORDER — ACETAMINOPHEN 650 MG/1
650 SUPPOSITORY RECTAL EVERY 6 HOURS PRN
Status: DISCONTINUED | OUTPATIENT
Start: 2025-03-17 | End: 2025-03-20 | Stop reason: HOSPADM

## 2025-03-17 RX ORDER — SOTALOL HYDROCHLORIDE 80 MG/1
120 TABLET ORAL EVERY 12 HOURS
Status: DISCONTINUED | OUTPATIENT
Start: 2025-03-17 | End: 2025-03-17

## 2025-03-17 RX ORDER — SODIUM CHLORIDE 0.9 % (FLUSH) 0.9 %
5-40 SYRINGE (ML) INJECTION EVERY 12 HOURS SCHEDULED
Status: DISCONTINUED | OUTPATIENT
Start: 2025-03-17 | End: 2025-03-20 | Stop reason: HOSPADM

## 2025-03-17 RX ORDER — MAGNESIUM SULFATE IN WATER 40 MG/ML
2000 INJECTION, SOLUTION INTRAVENOUS PRN
Status: DISCONTINUED | OUTPATIENT
Start: 2025-03-17 | End: 2025-03-17

## 2025-03-17 RX ORDER — TAMSULOSIN HYDROCHLORIDE 0.4 MG/1
0.4 CAPSULE ORAL 2 TIMES DAILY
Status: DISCONTINUED | OUTPATIENT
Start: 2025-03-17 | End: 2025-03-17

## 2025-03-17 RX ORDER — SPIRONOLACTONE 25 MG/1
25 TABLET ORAL DAILY
Status: DISCONTINUED | OUTPATIENT
Start: 2025-03-17 | End: 2025-03-20 | Stop reason: HOSPADM

## 2025-03-17 RX ORDER — TAMSULOSIN HYDROCHLORIDE 0.4 MG/1
0.8 CAPSULE ORAL NIGHTLY
Status: DISCONTINUED | OUTPATIENT
Start: 2025-03-17 | End: 2025-03-20 | Stop reason: HOSPADM

## 2025-03-17 RX ORDER — POTASSIUM CHLORIDE 1500 MG/1
40 TABLET, EXTENDED RELEASE ORAL PRN
Status: DISCONTINUED | OUTPATIENT
Start: 2025-03-17 | End: 2025-03-17

## 2025-03-17 RX ORDER — ACETAMINOPHEN 650 MG/1
650 SUPPOSITORY RECTAL EVERY 6 HOURS PRN
Status: DISCONTINUED | OUTPATIENT
Start: 2025-03-17 | End: 2025-03-17 | Stop reason: SDUPTHER

## 2025-03-17 RX ORDER — METOPROLOL SUCCINATE 25 MG/1
12.5 TABLET, EXTENDED RELEASE ORAL NIGHTLY
Status: DISCONTINUED | OUTPATIENT
Start: 2025-03-17 | End: 2025-03-17

## 2025-03-17 RX ORDER — THERA TABS 400 MCG
1 TAB ORAL DAILY
Status: DISCONTINUED | OUTPATIENT
Start: 2025-03-17 | End: 2025-03-17

## 2025-03-17 RX ORDER — ROPINIROLE 2 MG/1
2 TABLET, FILM COATED ORAL 4 TIMES DAILY
Status: DISCONTINUED | OUTPATIENT
Start: 2025-03-17 | End: 2025-03-20 | Stop reason: HOSPADM

## 2025-03-17 RX ORDER — POLYETHYLENE GLYCOL 3350 17 G/17G
17 POWDER, FOR SOLUTION ORAL DAILY PRN
Status: DISCONTINUED | OUTPATIENT
Start: 2025-03-17 | End: 2025-03-20 | Stop reason: HOSPADM

## 2025-03-17 RX ORDER — ALBUTEROL SULFATE 90 UG/1
2 INHALANT RESPIRATORY (INHALATION) 4 TIMES DAILY PRN
Status: DISCONTINUED | OUTPATIENT
Start: 2025-03-17 | End: 2025-03-20 | Stop reason: HOSPADM

## 2025-03-17 RX ORDER — ATORVASTATIN CALCIUM 80 MG/1
80 TABLET, FILM COATED ORAL NIGHTLY
Status: DISCONTINUED | OUTPATIENT
Start: 2025-03-17 | End: 2025-03-20 | Stop reason: HOSPADM

## 2025-03-17 RX ORDER — IPRATROPIUM BROMIDE AND ALBUTEROL SULFATE 2.5; .5 MG/3ML; MG/3ML
1 SOLUTION RESPIRATORY (INHALATION) EVERY 4 HOURS PRN
Status: DISCONTINUED | OUTPATIENT
Start: 2025-03-17 | End: 2025-03-20 | Stop reason: HOSPADM

## 2025-03-17 RX ORDER — METOPROLOL SUCCINATE 25 MG/1
12.5 TABLET, EXTENDED RELEASE ORAL DAILY
Status: DISCONTINUED | OUTPATIENT
Start: 2025-03-18 | End: 2025-03-20 | Stop reason: HOSPADM

## 2025-03-17 RX ORDER — OXYBUTYNIN CHLORIDE 5 MG/1
5 TABLET, EXTENDED RELEASE ORAL DAILY
Status: DISCONTINUED | OUTPATIENT
Start: 2025-03-17 | End: 2025-03-20 | Stop reason: HOSPADM

## 2025-03-17 RX ORDER — POTASSIUM CHLORIDE 7.45 MG/ML
10 INJECTION INTRAVENOUS PRN
Status: DISCONTINUED | OUTPATIENT
Start: 2025-03-17 | End: 2025-03-17

## 2025-03-17 RX ORDER — ACETAMINOPHEN 325 MG/1
650 TABLET ORAL EVERY 6 HOURS PRN
Status: DISCONTINUED | OUTPATIENT
Start: 2025-03-17 | End: 2025-03-17 | Stop reason: SDUPTHER

## 2025-03-17 RX ADMIN — ROPINIROLE HYDROCHLORIDE 2 MG: 2 TABLET, FILM COATED ORAL at 13:22

## 2025-03-17 RX ADMIN — OXYBUTYNIN CHLORIDE 5 MG: 5 TABLET, EXTENDED RELEASE ORAL at 13:22

## 2025-03-17 RX ADMIN — ROPINIROLE HYDROCHLORIDE 2 MG: 2 TABLET, FILM COATED ORAL at 21:00

## 2025-03-17 RX ADMIN — SOTALOL HYDROCHLORIDE 120 MG: 80 TABLET ORAL at 09:57

## 2025-03-17 RX ADMIN — ATORVASTATIN CALCIUM 80 MG: 80 TABLET, FILM COATED ORAL at 21:01

## 2025-03-17 RX ADMIN — Medication 10 ML: at 21:03

## 2025-03-17 RX ADMIN — TAMSULOSIN HYDROCHLORIDE 0.8 MG: 0.4 CAPSULE ORAL at 21:01

## 2025-03-17 RX ADMIN — Medication 10 ML: at 11:32

## 2025-03-17 RX ADMIN — SOTALOL HYDROCHLORIDE 80 MG: 80 TABLET ORAL at 21:00

## 2025-03-17 RX ADMIN — ROPINIROLE HYDROCHLORIDE 2 MG: 2 TABLET, FILM COATED ORAL at 18:12

## 2025-03-17 NOTE — CONSULTS
CARDIAC ELECTROPHYSIOLOGY CONSULT NOTE  Date: 2/28/25  Patient Name: Nick Hawkins  YOB: 1955    Primary Care Physician: Imani Sahu APRN - CNP    Referring Physician: Diane Enzweiler, CNP     CHIEF COMPLAINT:   No chief complaint on file.       Nick Hawkins was seen today on 3/17/2025 in consultation due to chief complaint of atrial fibrillation and NSVT    Patient is a 70 y.o. male with PMH of HTN, CHF, RBBB, ascending aorta dilatation, COPD, NICOLE, and CKD who was seen today for atrial fibrillation and NSVT. Patient wore a cardiac event monitor 1/16/2025-2/16/2025 which demonstrated predominately SR 69 () BPM, <10% PAC burden, <1% PVC burden, pSVT x 14 beats in duration. NSVT x 7 beats in duration, no AF. Most recent echo 1/2025 demonstrated an LVEF of 55-60%.     He is presenting for Sotalol loading after being seen in my clinic last week. Him and his wife did say that the cardiac MRI had a large copay that they could not afford.     ALLERGIES:   Allergies   Allergen Reactions    Celebrex [Celecoxib]      Nosebleeds    Elavil [Amitriptyline Hcl]      Nose bleed    Topamax      Leg cramps        MEDICATIONS:   Current Facility-Administered Medications   Medication Dose Route Frequency Provider Last Rate Last Admin    sodium chloride flush 0.9 % injection 5-40 mL  5-40 mL IntraVENous 2 times per day Meri Bell APRN - CNP        sodium chloride flush 0.9 % injection 5-40 mL  5-40 mL IntraVENous PRN Meri Bell APRN - CNP        0.9 % sodium chloride infusion   IntraVENous PRN Meri Bell APRN - CNP        potassium chloride (KLOR-CON M) extended release tablet 40 mEq  40 mEq Oral PRN Meri Bell APRN - CNP        Or    potassium bicarb-citric acid (EFFER-K) effervescent tablet 40 mEq  40 mEq Oral PRN Meri Bell APRN - CNP        Or    potassium chloride 10 mEq/100 mL IVPB (Peripheral Line)  10 mEq IntraVENous PRN Meri Bell APRN - CNP        magnesium

## 2025-03-17 NOTE — H&P
Hospital Medicine History & Physical    V 1.6    Date of Admission: 3/17/2025    Date of Service:  Pt seen/examined on 3/17/25     [x]Admitted to Inpatient with expected LOS greater than two midnights due to medical therapy.  []Placed in Observation status.    Chief Admission Complaint:  abnormal cardiac event monitor findings    Presenting Admission History:  70 y.o. male with past medical history significant for a-fib on chronic anticoagulation, hyperlipidemia, obesity, BPH.  Presented to [facility] at request of cardiology for abnormal findings on cardiac event monitor.  Pt states he was asymptomatic during the events.  Denies chest pain, dyspnea, palpitations, dizziness, n/v, diaphoresis.  There are no aggravating or alleviating factors.  No radiation.      Assessment/Plan:      Current Principal Problem:  Ventricular tachyarrhythmia (HCC)    Ventricular tachyarrhythmia.  Started sotalol 3/18 by EP, will remain admitted through at least the first 5 doses.  Monitor on telemetry.  Continue toprol-xl at half of his home dose (took full dose this AM at home).  Daily renal panel to monitor electrolytes.    Atrial fibrillation, paroxysmal.  Rate controlled.  Continue warfarin, pharmacy assisting with dosing.  Daily PT/INR.  INR was 2.18 on admission.  CBC every other day unless s/s of bleeding.  Monitor on telemetry    Acute kidney injury.  Baseline creat likely 0.9-1.  On arrival it was 1.4, which meets KDIGO criteria for KAVYA.  Check bladder scan.  Monitor.  Avoid hypotension, nephrotoxics as able    Hyperlipidemia, controlled.  LDL noted to be 65 in Jan 2025.  Continue statin    BPH, stable. No evidence of urinary retention or obstruction. Continue home dose tamsulosin    Obesity.  Body mass index is 45.98 kg/m².  Complicating assessment and treatment.  Placing patient at risk for multiple co-morbidities as well as early death and contributing to the patient's presentation.  Counseled on weight

## 2025-03-17 NOTE — TELEPHONE ENCOUNTER
Venous from [facility] called for bed assignment of Room 210 bed 1. Pt was called and is aware.

## 2025-03-17 NOTE — CONSULTS
Pharmacy Note  Warfarin Consult  Dx: Afib  Goal INR range 2-3   Home Warfarin dose: 7.5 mg Mon and Wed, 5 mg all other days       Date  INR  Warfarin  3/17                2.18                      7.5 mg (took at home today)      Recommend no warfarin tonight x1 since patient already took this morning.  Daily INR ordered.  Rx will continue to manage therapy per consult order.    Mando Rocha, PharmD 3/17/2025 10:38 AM

## 2025-03-17 NOTE — ON TREATMENT VISIT
Given BMP finding of Estimated Creatinine Clearance: 71 mL/min (A) (based on SCr of 1.4 mg/dL (H)). With variation in Scr, will decrease Sotalol to 80 mg BID

## 2025-03-18 ENCOUNTER — ANESTHESIA EVENT (OUTPATIENT)
Dept: CARDIAC CATH/INVASIVE PROCEDURES | Age: 70
DRG: 309 | End: 2025-03-18
Payer: MEDICARE

## 2025-03-18 LAB
ANION GAP SERPL CALCULATED.3IONS-SCNC: 8 MMOL/L (ref 3–16)
BASOPHILS # BLD: 0.1 K/UL (ref 0–0.2)
BASOPHILS NFR BLD: 1.1 %
BILIRUB UR QL STRIP.AUTO: NEGATIVE
BUN SERPL-MCNC: 31 MG/DL (ref 7–20)
CALCIUM SERPL-MCNC: 9.2 MG/DL (ref 8.3–10.6)
CHLORIDE SERPL-SCNC: 110 MMOL/L (ref 99–110)
CLARITY UR: CLEAR
CO2 SERPL-SCNC: 24 MMOL/L (ref 21–32)
COLOR UR: YELLOW
CREAT SERPL-MCNC: 1.5 MG/DL (ref 0.8–1.3)
DEPRECATED RDW RBC AUTO: 16.3 % (ref 12.4–15.4)
EOSINOPHIL # BLD: 0.2 K/UL (ref 0–0.6)
EOSINOPHIL NFR BLD: 2.9 %
EPI CELLS #/AREA URNS HPF: ABNORMAL /HPF (ref 0–5)
GFR SERPLBLD CREATININE-BSD FMLA CKD-EPI: 49 ML/MIN/{1.73_M2}
GLUCOSE SERPL-MCNC: 94 MG/DL (ref 70–99)
GLUCOSE UR STRIP.AUTO-MCNC: NEGATIVE MG/DL
HCT VFR BLD AUTO: 41.1 % (ref 40.5–52.5)
HGB BLD-MCNC: 13.9 G/DL (ref 13.5–17.5)
HGB UR QL STRIP.AUTO: ABNORMAL
HYALINE CASTS #/AREA URNS LPF: ABNORMAL /LPF (ref 0–2)
INR PPP: 2.54 (ref 0.85–1.15)
KETONES UR STRIP.AUTO-MCNC: NEGATIVE MG/DL
LEUKOCYTE ESTERASE UR QL STRIP.AUTO: NEGATIVE
LYMPHOCYTES # BLD: 1 K/UL (ref 1–5.1)
LYMPHOCYTES NFR BLD: 17 %
MAGNESIUM SERPL-MCNC: 2.26 MG/DL (ref 1.8–2.4)
MCH RBC QN AUTO: 29.4 PG (ref 26–34)
MCHC RBC AUTO-ENTMCNC: 33.9 G/DL (ref 31–36)
MCV RBC AUTO: 86.6 FL (ref 80–100)
MONOCYTES # BLD: 0.6 K/UL (ref 0–1.3)
MONOCYTES NFR BLD: 9.2 %
MUCOUS THREADS #/AREA URNS LPF: ABNORMAL /LPF
NEUTROPHILS # BLD: 4.3 K/UL (ref 1.7–7.7)
NEUTROPHILS NFR BLD: 69.8 %
NITRITE UR QL STRIP.AUTO: NEGATIVE
PH UR STRIP.AUTO: 5.5 [PH] (ref 5–8)
PLATELET # BLD AUTO: 192 K/UL (ref 135–450)
PMV BLD AUTO: 8.8 FL (ref 5–10.5)
POTASSIUM SERPL-SCNC: 5 MMOL/L (ref 3.5–5.1)
PROT UR STRIP.AUTO-MCNC: NEGATIVE MG/DL
PROTHROMBIN TIME: 27.3 SEC (ref 11.9–14.9)
RBC # BLD AUTO: 4.74 M/UL (ref 4.2–5.9)
RBC #/AREA URNS HPF: ABNORMAL /HPF (ref 0–4)
SODIUM SERPL-SCNC: 142 MMOL/L (ref 136–145)
SP GR UR STRIP.AUTO: 1.02 (ref 1–1.03)
UA COMPLETE W REFLEX CULTURE PNL UR: ABNORMAL
UA DIPSTICK W REFLEX MICRO PNL UR: YES
URN SPEC COLLECT METH UR: ABNORMAL
UROBILINOGEN UR STRIP-ACNC: 0.2 E.U./DL
WBC # BLD AUTO: 6.1 K/UL (ref 4–11)
WBC #/AREA URNS HPF: ABNORMAL /HPF (ref 0–5)

## 2025-03-18 PROCEDURE — 2500000003 HC RX 250 WO HCPCS

## 2025-03-18 PROCEDURE — 2580000003 HC RX 258

## 2025-03-18 PROCEDURE — 6370000000 HC RX 637 (ALT 250 FOR IP): Performed by: INTERNAL MEDICINE

## 2025-03-18 PROCEDURE — 83735 ASSAY OF MAGNESIUM: CPT

## 2025-03-18 PROCEDURE — 81001 URINALYSIS AUTO W/SCOPE: CPT

## 2025-03-18 PROCEDURE — 85610 PROTHROMBIN TIME: CPT

## 2025-03-18 PROCEDURE — 6370000000 HC RX 637 (ALT 250 FOR IP)

## 2025-03-18 PROCEDURE — 85025 COMPLETE CBC W/AUTO DIFF WBC: CPT

## 2025-03-18 PROCEDURE — 80048 BASIC METABOLIC PNL TOTAL CA: CPT

## 2025-03-18 PROCEDURE — 93005 ELECTROCARDIOGRAM TRACING: CPT | Performed by: INTERNAL MEDICINE

## 2025-03-18 PROCEDURE — 36415 COLL VENOUS BLD VENIPUNCTURE: CPT

## 2025-03-18 PROCEDURE — 2060000000 HC ICU INTERMEDIATE R&B

## 2025-03-18 RX ORDER — SODIUM CHLORIDE 0.9 % (FLUSH) 0.9 %
5-40 SYRINGE (ML) INJECTION PRN
Status: CANCELLED | OUTPATIENT
Start: 2025-03-18

## 2025-03-18 RX ORDER — HYDROCODONE BITARTRATE AND ACETAMINOPHEN 7.5; 325 MG/1; MG/1
1 TABLET ORAL EVERY 6 HOURS PRN
Refills: 0 | Status: DISCONTINUED | OUTPATIENT
Start: 2025-03-18 | End: 2025-03-20 | Stop reason: HOSPADM

## 2025-03-18 RX ORDER — WARFARIN SODIUM 5 MG/1
5 TABLET ORAL
Status: COMPLETED | OUTPATIENT
Start: 2025-03-18 | End: 2025-03-18

## 2025-03-18 RX ORDER — MIDAZOLAM HYDROCHLORIDE 1 MG/ML
2 INJECTION, SOLUTION INTRAMUSCULAR; INTRAVENOUS
Status: CANCELLED | OUTPATIENT
Start: 2025-03-18

## 2025-03-18 RX ORDER — SODIUM CHLORIDE 9 MG/ML
INJECTION, SOLUTION INTRAVENOUS PRN
Status: CANCELLED | OUTPATIENT
Start: 2025-03-18

## 2025-03-18 RX ORDER — LIDOCAINE HYDROCHLORIDE 10 MG/ML
1 INJECTION, SOLUTION EPIDURAL; INFILTRATION; INTRACAUDAL; PERINEURAL
Status: CANCELLED | OUTPATIENT
Start: 2025-03-18

## 2025-03-18 RX ORDER — SODIUM CHLORIDE 0.9 % (FLUSH) 0.9 %
5-40 SYRINGE (ML) INJECTION EVERY 12 HOURS SCHEDULED
Status: CANCELLED | OUTPATIENT
Start: 2025-03-18

## 2025-03-18 RX ORDER — SODIUM CHLORIDE, SODIUM LACTATE, POTASSIUM CHLORIDE, CALCIUM CHLORIDE 600; 310; 30; 20 MG/100ML; MG/100ML; MG/100ML; MG/100ML
INJECTION, SOLUTION INTRAVENOUS CONTINUOUS
Status: CANCELLED | OUTPATIENT
Start: 2025-03-18

## 2025-03-18 RX ORDER — SODIUM CHLORIDE 9 MG/ML
INJECTION, SOLUTION INTRAVENOUS CONTINUOUS
Status: ACTIVE | OUTPATIENT
Start: 2025-03-18 | End: 2025-03-18

## 2025-03-18 RX ADMIN — Medication 10 ML: at 09:06

## 2025-03-18 RX ADMIN — SPIRONOLACTONE 25 MG: 25 TABLET ORAL at 09:03

## 2025-03-18 RX ADMIN — SODIUM CHLORIDE: 9 INJECTION, SOLUTION INTRAVENOUS at 09:31

## 2025-03-18 RX ADMIN — METOPROLOL SUCCINATE 12.5 MG: 25 TABLET, EXTENDED RELEASE ORAL at 09:03

## 2025-03-18 RX ADMIN — ROPINIROLE HYDROCHLORIDE 2 MG: 2 TABLET, FILM COATED ORAL at 20:56

## 2025-03-18 RX ADMIN — TAMSULOSIN HYDROCHLORIDE 0.8 MG: 0.4 CAPSULE ORAL at 20:56

## 2025-03-18 RX ADMIN — HYDROCODONE BITARTRATE AND ACETAMINOPHEN 1 TABLET: 7.5; 325 TABLET ORAL at 16:34

## 2025-03-18 RX ADMIN — OXYBUTYNIN CHLORIDE 5 MG: 5 TABLET, EXTENDED RELEASE ORAL at 09:03

## 2025-03-18 RX ADMIN — Medication 10 ML: at 09:04

## 2025-03-18 RX ADMIN — SOTALOL HYDROCHLORIDE 80 MG: 80 TABLET ORAL at 20:56

## 2025-03-18 RX ADMIN — DOCUSATE SODIUM 100 MG: 100 CAPSULE, LIQUID FILLED ORAL at 20:56

## 2025-03-18 RX ADMIN — ROPINIROLE HYDROCHLORIDE 2 MG: 2 TABLET, FILM COATED ORAL at 13:33

## 2025-03-18 RX ADMIN — Medication 10 ML: at 20:57

## 2025-03-18 RX ADMIN — ROPINIROLE HYDROCHLORIDE 2 MG: 2 TABLET, FILM COATED ORAL at 09:04

## 2025-03-18 RX ADMIN — SOTALOL HYDROCHLORIDE 80 MG: 80 TABLET ORAL at 09:03

## 2025-03-18 RX ADMIN — ATORVASTATIN CALCIUM 80 MG: 80 TABLET, FILM COATED ORAL at 20:56

## 2025-03-18 RX ADMIN — WARFARIN SODIUM 5 MG: 5 TABLET ORAL at 18:08

## 2025-03-18 RX ADMIN — ROPINIROLE HYDROCHLORIDE 2 MG: 2 TABLET, FILM COATED ORAL at 16:35

## 2025-03-18 RX ADMIN — DOCUSATE SODIUM 100 MG: 100 CAPSULE, LIQUID FILLED ORAL at 09:05

## 2025-03-18 ASSESSMENT — PAIN DESCRIPTION - DESCRIPTORS: DESCRIPTORS: ACHING

## 2025-03-18 ASSESSMENT — PAIN SCALES - GENERAL: PAINLEVEL_OUTOF10: 5

## 2025-03-18 ASSESSMENT — PAIN DESCRIPTION - ORIENTATION: ORIENTATION: MID;LOWER

## 2025-03-18 ASSESSMENT — PAIN - FUNCTIONAL ASSESSMENT: PAIN_FUNCTIONAL_ASSESSMENT: ACTIVITIES ARE NOT PREVENTED

## 2025-03-18 ASSESSMENT — PAIN DESCRIPTION - ONSET: ONSET: ON-GOING

## 2025-03-18 ASSESSMENT — PAIN DESCRIPTION - FREQUENCY: FREQUENCY: CONTINUOUS

## 2025-03-18 ASSESSMENT — PAIN DESCRIPTION - LOCATION: LOCATION: BACK

## 2025-03-18 ASSESSMENT — PAIN DESCRIPTION - PAIN TYPE: TYPE: ACUTE PAIN

## 2025-03-18 NOTE — CARE COORDINATION
Case Management Assessment  Initial Evaluation    Date/Time of Evaluation: 3/18/2025 4:48 PM  Assessment Completed by: Destinee Moyer RN    If patient is discharged prior to next notation, then this note serves as note for discharge by case management.    Patient Name: Nick Hawkins                   YOB: 1955  Diagnosis: Venous insufficiency (chronic) (peripheral) [I87.2]  Localized edema [R60.0]  Ventricular tachyarrhythmia (HCC) [I47.20]                   Date / Time: 3/17/2025  8:33 AM    Patient Admission Status: Inpatient   Readmission Risk (Low < 19, Mod (19-27), High > 27): Readmission Risk Score: 13.6    Current PCP: Imani Sahu APRN - CNP  PCP verified by CM? Yes    Chart Reviewed: Yes      History Provided by: Patient  Patient Orientation: Alert and Oriented    Patient Cognition: Alert    Hospitalization in the last 30 days (Readmission):  No    If yes, Readmission Assessment in CM Navigator will be completed.    Advance Directives:      Code Status: Full Code   Patient's Primary Decision Maker is: Patient Declined (Legal Next of Kin Remains as Decision Maker)    Primary Decision Maker: Amber Hawkins - Spouse - [phone]    Discharge Planning:    Patient lives with: Spouse/Significant Other Type of Home: Trailer/Mobile Home  Primary Care Giver: Self  Patient Support Systems include: Spouse/Significant Other   Current Financial resources: Medicare  Current community resources: None  Current services prior to admission: Durable Medical Equipment, C-pap            Current DME: Cpap, Walker, Cane            Type of Home Care services:  None    ADLS  Prior functional level: Independent in ADLs/IADLs  Current functional level: Independent in ADLs/IADLs    PT AM-PAC:   /24  OT AM-PAC:   /24    Family can provide assistance at DC: Yes  Would you like Case Management to discuss the discharge plan with any other family members/significant others, and if so, who? Yes (wife)  Plans to Return to

## 2025-03-19 ENCOUNTER — ANESTHESIA (OUTPATIENT)
Dept: CARDIAC CATH/INVASIVE PROCEDURES | Age: 70
DRG: 309 | End: 2025-03-19
Payer: MEDICARE

## 2025-03-19 ENCOUNTER — HOSPITAL ENCOUNTER (INPATIENT)
Dept: CARDIAC CATH/INVASIVE PROCEDURES | Age: 70
Discharge: HOME | DRG: 309 | End: 2025-03-21
Attending: INTERNAL MEDICINE
Payer: MEDICARE

## 2025-03-19 VITALS
OXYGEN SATURATION: 94 % | DIASTOLIC BLOOD PRESSURE: 78 MMHG | HEART RATE: 58 BPM | SYSTOLIC BLOOD PRESSURE: 100 MMHG | RESPIRATION RATE: 13 BRPM

## 2025-03-19 LAB
ANION GAP SERPL CALCULATED.3IONS-SCNC: 9 MMOL/L (ref 3–16)
BUN SERPL-MCNC: 25 MG/DL (ref 7–20)
CALCIUM SERPL-MCNC: 8.9 MG/DL (ref 8.3–10.6)
CHLORIDE SERPL-SCNC: 111 MMOL/L (ref 99–110)
CO2 SERPL-SCNC: 21 MMOL/L (ref 21–32)
CREAT SERPL-MCNC: 1.3 MG/DL (ref 0.8–1.3)
ECHO BSA: 2.74 M2
EKG ATRIAL RATE: 59 BPM
EKG DIAGNOSIS: NORMAL
EKG P AXIS: 35 DEGREES
EKG P-R INTERVAL: 216 MS
EKG Q-T INTERVAL: 432 MS
EKG Q-T INTERVAL: 436 MS
EKG Q-T INTERVAL: 454 MS
EKG Q-T INTERVAL: 490 MS
EKG QRS DURATION: 146 MS
EKG QRS DURATION: 148 MS
EKG QRS DURATION: 150 MS
EKG QRS DURATION: 154 MS
EKG QTC CALCULATION (BAZETT): 485 MS
EKG QTC CALCULATION (BAZETT): 490 MS
EKG QTC CALCULATION (BAZETT): 493 MS
EKG QTC CALCULATION (BAZETT): 501 MS
EKG R AXIS: -18 DEGREES
EKG R AXIS: -20 DEGREES
EKG R AXIS: -26 DEGREES
EKG R AXIS: -37 DEGREES
EKG T AXIS: 0 DEGREES
EKG T AXIS: 0 DEGREES
EKG T AXIS: 1 DEGREES
EKG T AXIS: 5 DEGREES
EKG VENTRICULAR RATE: 59 BPM
EKG VENTRICULAR RATE: 71 BPM
EKG VENTRICULAR RATE: 76 BPM
EKG VENTRICULAR RATE: 81 BPM
GFR SERPLBLD CREATININE-BSD FMLA CKD-EPI: 58 ML/MIN/{1.73_M2}
GLUCOSE SERPL-MCNC: 88 MG/DL (ref 70–99)
INR PPP: 2.53 (ref 0.85–1.15)
MAGNESIUM SERPL-MCNC: 2.27 MG/DL (ref 1.8–2.4)
POTASSIUM SERPL-SCNC: 4.4 MMOL/L (ref 3.5–5.1)
PROTHROMBIN TIME: 27.2 SEC (ref 11.9–14.9)
SODIUM SERPL-SCNC: 141 MMOL/L (ref 136–145)

## 2025-03-19 PROCEDURE — 93010 ELECTROCARDIOGRAM REPORT: CPT | Performed by: INTERNAL MEDICINE

## 2025-03-19 PROCEDURE — 92960 CARDIOVERSION ELECTRIC EXT: CPT

## 2025-03-19 PROCEDURE — 7100000010 HC PHASE II RECOVERY - FIRST 15 MIN: Performed by: INTERNAL MEDICINE

## 2025-03-19 PROCEDURE — 6370000000 HC RX 637 (ALT 250 FOR IP)

## 2025-03-19 PROCEDURE — 6370000000 HC RX 637 (ALT 250 FOR IP): Performed by: INTERNAL MEDICINE

## 2025-03-19 PROCEDURE — 2500000003 HC RX 250 WO HCPCS: Performed by: INTERNAL MEDICINE

## 2025-03-19 PROCEDURE — 80048 BASIC METABOLIC PNL TOTAL CA: CPT

## 2025-03-19 PROCEDURE — 85610 PROTHROMBIN TIME: CPT

## 2025-03-19 PROCEDURE — 93005 ELECTROCARDIOGRAM TRACING: CPT | Performed by: INTERNAL MEDICINE

## 2025-03-19 PROCEDURE — 6370000000 HC RX 637 (ALT 250 FOR IP): Performed by: NURSE PRACTITIONER

## 2025-03-19 PROCEDURE — 83735 ASSAY OF MAGNESIUM: CPT

## 2025-03-19 PROCEDURE — 2060000000 HC ICU INTERMEDIATE R&B

## 2025-03-19 PROCEDURE — 5A2204Z RESTORATION OF CARDIAC RHYTHM, SINGLE: ICD-10-PCS | Performed by: INTERNAL MEDICINE

## 2025-03-19 PROCEDURE — 3700000000 HC ANESTHESIA ATTENDED CARE: Performed by: INTERNAL MEDICINE

## 2025-03-19 PROCEDURE — 2500000003 HC RX 250 WO HCPCS

## 2025-03-19 PROCEDURE — 6360000002 HC RX W HCPCS

## 2025-03-19 PROCEDURE — 7100000011 HC PHASE II RECOVERY - ADDTL 15 MIN: Performed by: INTERNAL MEDICINE

## 2025-03-19 PROCEDURE — 2580000003 HC RX 258: Performed by: INTERNAL MEDICINE

## 2025-03-19 PROCEDURE — 36415 COLL VENOUS BLD VENIPUNCTURE: CPT

## 2025-03-19 RX ORDER — PROCHLORPERAZINE EDISYLATE 5 MG/ML
5 INJECTION INTRAMUSCULAR; INTRAVENOUS
Status: CANCELLED | OUTPATIENT
Start: 2025-03-19

## 2025-03-19 RX ORDER — LIDOCAINE HYDROCHLORIDE 20 MG/ML
INJECTION, SOLUTION INFILTRATION; PERINEURAL
Status: DISCONTINUED | OUTPATIENT
Start: 2025-03-19 | End: 2025-03-19 | Stop reason: SDUPTHER

## 2025-03-19 RX ORDER — DIPHENHYDRAMINE HCL 25 MG
50 TABLET ORAL EVERY 6 HOURS PRN
Status: DISCONTINUED | OUTPATIENT
Start: 2025-03-19 | End: 2025-03-20 | Stop reason: HOSPADM

## 2025-03-19 RX ORDER — SODIUM CHLORIDE 9 MG/ML
INJECTION, SOLUTION INTRAVENOUS PRN
Status: DISCONTINUED | OUTPATIENT
Start: 2025-03-19 | End: 2025-03-20 | Stop reason: HOSPADM

## 2025-03-19 RX ORDER — PROPOFOL 10 MG/ML
INJECTION, EMULSION INTRAVENOUS
Status: DISCONTINUED | OUTPATIENT
Start: 2025-03-19 | End: 2025-03-19 | Stop reason: SDUPTHER

## 2025-03-19 RX ORDER — SODIUM CHLORIDE 0.9 % (FLUSH) 0.9 %
5-40 SYRINGE (ML) INJECTION EVERY 12 HOURS SCHEDULED
Status: DISCONTINUED | OUTPATIENT
Start: 2025-03-19 | End: 2025-03-20 | Stop reason: HOSPADM

## 2025-03-19 RX ORDER — NALOXONE HYDROCHLORIDE 0.4 MG/ML
INJECTION, SOLUTION INTRAMUSCULAR; INTRAVENOUS; SUBCUTANEOUS PRN
Status: CANCELLED | OUTPATIENT
Start: 2025-03-19

## 2025-03-19 RX ORDER — OXYCODONE HYDROCHLORIDE 5 MG/1
5 TABLET ORAL
Refills: 0 | Status: CANCELLED | OUTPATIENT
Start: 2025-03-19

## 2025-03-19 RX ORDER — SODIUM CHLORIDE 0.9 % (FLUSH) 0.9 %
5-40 SYRINGE (ML) INJECTION EVERY 12 HOURS SCHEDULED
Status: CANCELLED | OUTPATIENT
Start: 2025-03-19

## 2025-03-19 RX ORDER — SODIUM CHLORIDE 0.9 % (FLUSH) 0.9 %
5-40 SYRINGE (ML) INJECTION PRN
Status: CANCELLED | OUTPATIENT
Start: 2025-03-19

## 2025-03-19 RX ORDER — MEPERIDINE HYDROCHLORIDE 50 MG/ML
12.5 INJECTION INTRAMUSCULAR; INTRAVENOUS; SUBCUTANEOUS EVERY 5 MIN PRN
Refills: 0 | Status: CANCELLED | OUTPATIENT
Start: 2025-03-19

## 2025-03-19 RX ORDER — WARFARIN SODIUM 7.5 MG/1
7.5 TABLET ORAL
Status: COMPLETED | OUTPATIENT
Start: 2025-03-19 | End: 2025-03-19

## 2025-03-19 RX ORDER — DIPHENHYDRAMINE HYDROCHLORIDE 50 MG/ML
12.5 INJECTION, SOLUTION INTRAMUSCULAR; INTRAVENOUS
Status: CANCELLED | OUTPATIENT
Start: 2025-03-19

## 2025-03-19 RX ORDER — SODIUM CHLORIDE 0.9 % (FLUSH) 0.9 %
5-40 SYRINGE (ML) INJECTION PRN
Status: DISCONTINUED | OUTPATIENT
Start: 2025-03-19 | End: 2025-03-20 | Stop reason: HOSPADM

## 2025-03-19 RX ORDER — LORAZEPAM 2 MG/ML
0.5 INJECTION INTRAMUSCULAR
Status: CANCELLED | OUTPATIENT
Start: 2025-03-19

## 2025-03-19 RX ORDER — ONDANSETRON 2 MG/ML
4 INJECTION INTRAMUSCULAR; INTRAVENOUS
Status: CANCELLED | OUTPATIENT
Start: 2025-03-19

## 2025-03-19 RX ORDER — SODIUM CHLORIDE 9 MG/ML
INJECTION, SOLUTION INTRAVENOUS PRN
Status: CANCELLED | OUTPATIENT
Start: 2025-03-19

## 2025-03-19 RX ORDER — LABETALOL HYDROCHLORIDE 5 MG/ML
10 INJECTION, SOLUTION INTRAVENOUS
Status: CANCELLED | OUTPATIENT
Start: 2025-03-19

## 2025-03-19 RX ADMIN — SOTALOL HYDROCHLORIDE 80 MG: 80 TABLET ORAL at 20:08

## 2025-03-19 RX ADMIN — TAMSULOSIN HYDROCHLORIDE 0.8 MG: 0.4 CAPSULE ORAL at 20:07

## 2025-03-19 RX ADMIN — ROPINIROLE HYDROCHLORIDE 2 MG: 2 TABLET, FILM COATED ORAL at 09:04

## 2025-03-19 RX ADMIN — ROPINIROLE HYDROCHLORIDE 2 MG: 2 TABLET, FILM COATED ORAL at 20:08

## 2025-03-19 RX ADMIN — WARFARIN SODIUM 7.5 MG: 7.5 TABLET ORAL at 18:19

## 2025-03-19 RX ADMIN — DIPHENHYDRAMINE HYDROCHLORIDE 50 MG: 25 TABLET ORAL at 20:07

## 2025-03-19 RX ADMIN — SOTALOL HYDROCHLORIDE 80 MG: 80 TABLET ORAL at 09:04

## 2025-03-19 RX ADMIN — ATORVASTATIN CALCIUM 80 MG: 80 TABLET, FILM COATED ORAL at 20:08

## 2025-03-19 RX ADMIN — LIDOCAINE HYDROCHLORIDE 40 MG: 20 INJECTION, SOLUTION INFILTRATION; PERINEURAL at 11:42

## 2025-03-19 RX ADMIN — Medication 10 ML: at 09:09

## 2025-03-19 RX ADMIN — SODIUM CHLORIDE: 9 INJECTION, SOLUTION INTRAVENOUS at 11:42

## 2025-03-19 RX ADMIN — OXYBUTYNIN CHLORIDE 5 MG: 5 TABLET, EXTENDED RELEASE ORAL at 09:04

## 2025-03-19 RX ADMIN — ROPINIROLE HYDROCHLORIDE 2 MG: 2 TABLET, FILM COATED ORAL at 13:53

## 2025-03-19 RX ADMIN — DIPHENHYDRAMINE HYDROCHLORIDE 50 MG: 25 TABLET ORAL at 04:23

## 2025-03-19 RX ADMIN — HYDROCODONE BITARTRATE AND ACETAMINOPHEN 1 TABLET: 7.5; 325 TABLET ORAL at 09:14

## 2025-03-19 RX ADMIN — Medication 10 ML: at 20:12

## 2025-03-19 RX ADMIN — SPIRONOLACTONE 25 MG: 25 TABLET ORAL at 09:05

## 2025-03-19 RX ADMIN — DOCUSATE SODIUM 100 MG: 100 CAPSULE, LIQUID FILLED ORAL at 20:08

## 2025-03-19 RX ADMIN — Medication 10 ML: at 20:09

## 2025-03-19 RX ADMIN — ROPINIROLE HYDROCHLORIDE 2 MG: 2 TABLET, FILM COATED ORAL at 18:19

## 2025-03-19 RX ADMIN — PROPOFOL 80 MG: 10 INJECTION, EMULSION INTRAVENOUS at 11:42

## 2025-03-19 RX ADMIN — Medication 10 ML: at 09:05

## 2025-03-19 RX ADMIN — DOCUSATE SODIUM 100 MG: 100 CAPSULE, LIQUID FILLED ORAL at 09:04

## 2025-03-19 RX ADMIN — METOPROLOL SUCCINATE 12.5 MG: 25 TABLET, EXTENDED RELEASE ORAL at 09:04

## 2025-03-19 ASSESSMENT — PAIN - FUNCTIONAL ASSESSMENT
PAIN_FUNCTIONAL_ASSESSMENT: PREVENTS OR INTERFERES SOME ACTIVE ACTIVITIES AND ADLS
PAIN_FUNCTIONAL_ASSESSMENT: ACTIVITIES ARE NOT PREVENTED

## 2025-03-19 ASSESSMENT — PAIN SCALES - GENERAL
PAINLEVEL_OUTOF10: 5
PAINLEVEL_OUTOF10: 5
PAINLEVEL_OUTOF10: 0

## 2025-03-19 ASSESSMENT — PAIN DESCRIPTION - FREQUENCY
FREQUENCY: CONTINUOUS
FREQUENCY: CONTINUOUS

## 2025-03-19 ASSESSMENT — PAIN DESCRIPTION - DESCRIPTORS
DESCRIPTORS: ACHING
DESCRIPTORS: ACHING

## 2025-03-19 ASSESSMENT — PAIN DESCRIPTION - LOCATION
LOCATION: BACK
LOCATION: BACK

## 2025-03-19 ASSESSMENT — ENCOUNTER SYMPTOMS: SHORTNESS OF BREATH: 1

## 2025-03-19 ASSESSMENT — PAIN DESCRIPTION - ORIENTATION
ORIENTATION: MID;LOWER
ORIENTATION: MID;LOWER

## 2025-03-19 ASSESSMENT — PAIN DESCRIPTION - PAIN TYPE
TYPE: ACUTE PAIN
TYPE: ACUTE PAIN

## 2025-03-19 ASSESSMENT — PAIN DESCRIPTION - ONSET
ONSET: ON-GOING
ONSET: ON-GOING

## 2025-03-19 NOTE — DISCHARGE INSTRUCTIONS
Cath Lab at  [facility]    Cardioversion Discharge Instructions    3/19/2025  Nick Hawkins   Date of Birth 1955       Activity:  No driving for 24 hours  Resume regular activities in 24 hours    Diet  Resume previous diet    Special instructions:  Report any difficulties breathing to doctor or call 911  Report and change in heart rhythm to doctor  Report skin irritation from cardioversion pads to doctor  Report any change in level of consciousness or sensory/motor changes     Instructions for Moderate Sedation :   You may feel sleepy for the next 24 hours.  You may expect drowsiness, light-headedness, nausea/vomiting or inability to concentrate.  This feeling may slowly wear off but, for the next 24 hours DO NOT :   Drive a car or operated heavy machinery, power tools or kitchen appliances.   Drink any alcoholic drinks (not even beer or wine)  Make any important decisions or sign any important papers.   We Strongly suggest that a responsible adult be with you for the next 24 hours for your protection and safety.    You will not be allowed to drive yourself home, or take public transportation such as a train, bus, or taxi after your procedure.   If any questions arise call your doctor, if unable to reach your doctor and require immediate assistance call the Emergency Department at [phone] or [phone].

## 2025-03-19 NOTE — ANESTHESIA POSTPROCEDURE EVALUATION
Department of Anesthesiology  Postprocedure Note    Patient: Nick Hawkins  MRN: [id number]  YOB: 1955  Date of evaluation: 3/19/2025    Procedure Summary       Date: 03/19/25 Room / Location: [facility]    Anesthesia Start: 1138 Anesthesia Stop: 1150    Procedure: CARDIOVERSION EXTERNAL Diagnosis: Paroxysmal atrial fibrillation (HCC)    Scheduled Providers: Cailin Rodriguez DO Responsible Provider: Hunter Chery MD    Anesthesia Type: MAC ASA Status: 3            Anesthesia Type: No value filed.    Gio Phase I:      Gio Phase II:      Anesthesia Post Evaluation    Patient location during evaluation: PACU  Patient participation: complete - patient participated  Level of consciousness: awake and alert  Airway patency: patent  Nausea & Vomiting: no vomiting and no nausea  Cardiovascular status: hemodynamically stable and blood pressure returned to baseline  Respiratory status: acceptable  Hydration status: euvolemic  Comments: --------------------            03/19/25               1155     --------------------   BP:                  Pulse:      59       Resp:       15       SpO2:      100%     --------------------    Pain management: adequate    No notable events documented.

## 2025-03-19 NOTE — PROCEDURES
PROCEDURE NOTE  Date: 3/19/2025   Name: Nick Hawkins  YOB: 1955    Procedures    3/19/2025    PROCEDURE PERFORMED:     1. Direct current cardioversion.    INDICATIONS: Symptomatic Atrial fibrillation.     PROCEDURE: The patient was brought to the lab in fasting state. The patient received adequate sedation with the assistance of anesthesia for the case. After ensuring adequate sedation, the patient was prepped for cardioversion. A synchronized shock delivering 200 and then 360 Joules energy was given. The patient was successfully cardioverted to normal sinus rhythm.  The patient tolerated the procedure well. Post-procedure examination notable for stable vitals with hemodynamic and neurovascular recovery.    POST PROCEDURE EKG: sinus rhythm     CONCLUSION: Successful direct current cardioversion.     PLAN:   1. Continue systemic anticoagulation.   2. Continue current cardiac medications.    Cailin Rodriguez DO   [facility] [place]   [phone] [facility]   [phone] [facility]

## 2025-03-20 VITALS
DIASTOLIC BLOOD PRESSURE: 81 MMHG | OXYGEN SATURATION: 97 % | RESPIRATION RATE: 15 BRPM | SYSTOLIC BLOOD PRESSURE: 129 MMHG | HEIGHT: 71 IN | HEART RATE: 64 BPM | BODY MASS INDEX: 44.1 KG/M2 | WEIGHT: 315 LBS | TEMPERATURE: 97.9 F

## 2025-03-20 LAB
ANION GAP SERPL CALCULATED.3IONS-SCNC: 7 MMOL/L (ref 3–16)
BUN SERPL-MCNC: 24 MG/DL (ref 7–20)
CALCIUM SERPL-MCNC: 9.2 MG/DL (ref 8.3–10.6)
CHLORIDE SERPL-SCNC: 111 MMOL/L (ref 99–110)
CO2 SERPL-SCNC: 24 MMOL/L (ref 21–32)
CREAT SERPL-MCNC: 1.2 MG/DL (ref 0.8–1.3)
GFR SERPLBLD CREATININE-BSD FMLA CKD-EPI: 64 ML/MIN/{1.73_M2}
GLUCOSE SERPL-MCNC: 100 MG/DL (ref 70–99)
INR PPP: 2.39 (ref 0.85–1.15)
MAGNESIUM SERPL-MCNC: 2.24 MG/DL (ref 1.8–2.4)
POTASSIUM SERPL-SCNC: 4.9 MMOL/L (ref 3.5–5.1)
PROTHROMBIN TIME: 26 SEC (ref 11.9–14.9)
SODIUM SERPL-SCNC: 142 MMOL/L (ref 136–145)

## 2025-03-20 PROCEDURE — 83735 ASSAY OF MAGNESIUM: CPT

## 2025-03-20 PROCEDURE — 6370000000 HC RX 637 (ALT 250 FOR IP): Performed by: INTERNAL MEDICINE

## 2025-03-20 PROCEDURE — 80048 BASIC METABOLIC PNL TOTAL CA: CPT

## 2025-03-20 PROCEDURE — 85610 PROTHROMBIN TIME: CPT

## 2025-03-20 PROCEDURE — 2500000003 HC RX 250 WO HCPCS: Performed by: INTERNAL MEDICINE

## 2025-03-20 PROCEDURE — 6370000000 HC RX 637 (ALT 250 FOR IP)

## 2025-03-20 PROCEDURE — 36415 COLL VENOUS BLD VENIPUNCTURE: CPT

## 2025-03-20 RX ORDER — WARFARIN SODIUM 5 MG/1
5 TABLET ORAL
Status: DISCONTINUED | OUTPATIENT
Start: 2025-03-20 | End: 2025-03-20 | Stop reason: HOSPADM

## 2025-03-20 RX ORDER — SOTALOL HYDROCHLORIDE 80 MG/1
80 TABLET ORAL EVERY 12 HOURS
Qty: 60 TABLET | Refills: 3 | Status: SHIPPED | OUTPATIENT
Start: 2025-03-20

## 2025-03-20 RX ORDER — METOPROLOL SUCCINATE 25 MG/1
12.5 TABLET, EXTENDED RELEASE ORAL DAILY
Qty: 30 TABLET | Refills: 3 | Status: SHIPPED | OUTPATIENT
Start: 2025-03-21

## 2025-03-20 RX ORDER — HYDROCODONE BITARTRATE AND ACETAMINOPHEN 7.5; 325 MG/1; MG/1
1 TABLET ORAL EVERY 6 HOURS PRN
Qty: 12 TABLET | Refills: 0 | Status: SHIPPED | OUTPATIENT
Start: 2025-03-20 | End: 2025-03-23

## 2025-03-20 RX ADMIN — SPIRONOLACTONE 25 MG: 25 TABLET ORAL at 08:29

## 2025-03-20 RX ADMIN — ROPINIROLE HYDROCHLORIDE 2 MG: 2 TABLET, FILM COATED ORAL at 08:30

## 2025-03-20 RX ADMIN — SOTALOL HYDROCHLORIDE 80 MG: 80 TABLET ORAL at 08:30

## 2025-03-20 RX ADMIN — OXYBUTYNIN CHLORIDE 5 MG: 5 TABLET, EXTENDED RELEASE ORAL at 08:30

## 2025-03-20 RX ADMIN — METOPROLOL SUCCINATE 12.5 MG: 25 TABLET, EXTENDED RELEASE ORAL at 08:29

## 2025-03-20 RX ADMIN — DOCUSATE SODIUM 100 MG: 100 CAPSULE, LIQUID FILLED ORAL at 08:29

## 2025-03-20 RX ADMIN — Medication 10 ML: at 08:28

## 2025-03-20 NOTE — PLAN OF CARE
Problem: Chronic Conditions and Co-morbidities  Goal: Patient's chronic conditions and co-morbidity symptoms are monitored and maintained or improved  3/17/2025 2221 by Mariela Aleman RN  Outcome: Progressing     Problem: Safety - Adult  Goal: Free from fall injury  3/17/2025 2221 by Mariela Aleman, RN  Outcome: Progressing     Problem: Pain  Goal: Verbalizes/displays adequate comfort level or baseline comfort level  Outcome: Progressing     
  Problem: Chronic Conditions and Co-morbidities  Goal: Patient's chronic conditions and co-morbidity symptoms are monitored and maintained or improved  Outcome: Progressing     Problem: Discharge Planning  Goal: Discharge to home or other facility with appropriate resources  Outcome: Progressing     Problem: Safety - Adult  Goal: Free from fall injury  Outcome: Adequate for Discharge     Problem: Pain  Goal: Verbalizes/displays adequate comfort level or baseline comfort level  Outcome: Progressing     Problem: ABCDS Injury Assessment  Goal: Absence of physical injury  Outcome: Adequate for Discharge     
  Problem: Chronic Conditions and Co-morbidities  Goal: Patient's chronic conditions and co-morbidity symptoms are monitored and maintained or improved  Outcome: Progressing     Problem: Safety - Adult  Goal: Free from fall injury  Outcome: Progressing     Problem: Pain  Goal: Verbalizes/displays adequate comfort level or baseline comfort level  Outcome: Progressing     
Explanation  Response:   Verbalizes Understanding  Comments: educated patient on the importance of a daily weight log. Pt understands to call pcp if there is a weight gain of more than 5lbs. Education time 5 minutes.     Time Spent: 5 min      Pt sitting in bed at this time on room air. Pt denies shortness of breath. Pt without lower extremity edema.     Patient and/or Family's stated Goal of Care this Admission: increase activity tolerance, better understand heart failure and disease management, and be more comfortable prior to discharge        :

## 2025-03-20 NOTE — PROGRESS NOTES
03/17/25 1100   RT Protocol   History Pulmonary Disease 2   Respiratory pattern 0   Breath sounds 2   Cough 0   Indications for Bronchodilator Therapy On home bronchodilators   Bronchodilator Assessment Score 4       
   03/17/25 1133   Respiratory   Respiratory Pattern Regular   Respiratory Quality/Effort Unlabored   Chest Assessment Chest expansion symmetrical   Breath Sounds   Right Upper Lobe Clear   Right Middle Lobe Diminished   Right Lower Lobe Diminished   Left Upper Lobe Clear   Left Lower Lobe Diminished   Additional Respiratory Assessments   SpO2 95 %  (room air)       
  Hospital Medicine Progress Note  V 1.6      Date of Admission: 3/17/2025    Hospital Day: 3      Chief Admission Complaint:  abnormal cardiac event monitor findings    Subjective:  no needs expressed at this time.  Denies chest pain, n/v, dizziness, dyspnea    Presenting Admission History: 70 y.o. male with past medical history significant for a-fib on chronic anticoagulation, hyperlipidemia, obesity, BPH.  Presented to [facility] at request of cardiology for abnormal findings on cardiac event monitor.  Pt states he was asymptomatic during the events.  Denies chest pain, dyspnea, palpitations, dizziness, n/v, diaphoresis.  There are no aggravating or alleviating factors.  No radiation     Assessment/Plan:      Current Principal Problem:  Ventricular tachyarrhythmia (HCC)    Ventricular tachyarrhythmia.  Started sotalol 3/18 by EP, will remain admitted through at least the first 5 doses.  Monitor on telemetry.  Continue toprol-xl at half of his home dose.  Daily renal panel to monitor electrolytes.     Atrial fibrillation, paroxysmal.  Rate controlled.  Continue warfarin, pharmacy assisting with dosing.  Daily PT/INR.  INR currently 2.53.  CBC every other day unless s/s of bleeding.  Monitor on telemetry.  Planning DCCV 3/19 afternoon     Acute kidney injury, resolved.  Baseline creat likely 0.9-1.  On arrival it was 1.4, which meets KDIGO criteria for KAVYA.  Creat is currently 1.3.  Bladder scan = 51mL.  Monitor.  Avoid hypotension, nephrotoxics as able     Hyperlipidemia, controlled.  LDL noted to be 65 in Jan 2025.  Continue statin     BPH, stable. No evidence of urinary retention or obstruction. Continue home dose tamsulosin     Obesity.  Body mass index is 45.98 kg/m².  Complicating assessment and treatment.  Placing patient at risk for multiple co-morbidities as well as early death and contributing to the patient's presentation.  Counseled on weight loss.  [] Class I - 30.0 to 34.9 kg/m2  [] 
  Pharmacy Note  Warfarin Consult  Dx: Afib  Goal INR range 2-3   Home Warfarin dose: 7.5 mg Mon and Wed, 5 mg all other days         Date                 INR                  Warfarin  3/17                2.18                      7.5 mg (took at home today)  3/18                2.54                      5 mg   3/19                2.53                      7.5 mg  3/20                2.39                      5 mg     Recommend warfarin  5 mg tonight x1.  Daily INR ordered.  Rx will continue to manage therapy per consult order.  Osmar Turner PharmD 3/20/2025  6:16 AM  
  Pharmacy Note  Warfarin Consult  Dx: Afib  Goal INR range 2-3   Home Warfarin dose: 7.5 mg Mon and Wed, 5 mg all other days       Date  INR  Warfarin  3/17                2.18                      7.5 mg (took at home today)  3/18                2.54                      5 mg       Recommend warfarin  5 mg tonight x1 since patient already took this morning.  Daily INR ordered.  Rx will continue to manage therapy per consult order.  Angeline Bullard/[facility]. 3/18/25 6:54 AM EDT        
  Pharmacy Note  Warfarin Consult  Dx: Afib  Goal INR range 2-3   Home Warfarin dose: 7.5 mg Mon and Wed, 5 mg all other days       Date  INR  Warfarin  3/17                2.18                      7.5 mg (took at home today)  3/18                2.54                      5 mg   3/19                2.53                      7.5 mg    Recommend warfarin  7.5 mg tonight x1.  Daily INR ordered.  Rx will continue to manage therapy per consult order.  Mike Garrison, Pharm D.3/19/2025 5:04 AM              
Consult sent to Akira SALAZAR  
Patient arrived to A2 room 210 from home to start Sotalol. Call placed to cardiology to notify of arrival. Awaiting orders. Pt oriented to room. Bed in locked, lowest position. Call light within reach.  
Pt confirmed he does not have any home medications at the bedside.   
Pt okay to come off tele for 15 minutes for a shower per EP CNP. [facility] aware.  
Pt tolerated shower. Tele monitor reapplied.   
Pt transferred to Cath Lab. [facility] aware. Cath Lab RN notified of timed EKG scheduled for 1100. Plans to complete EKG following cardioversion.   
Vitals:    03/19/25 1215   BP: 108/85   Pulse: 57   Resp: 18   Temp: 97.7 °F (36.5 °C)   SpO2: 97%        Pt returned from Cath Lab. VSS. Spouse at bedside. [facility] notified of pt return.  
CNP   10 mL at 03/19/25 0909    sodium chloride flush 0.9 % injection 5-40 mL  5-40 mL IntraVENous PRN Meri Bell APRN - CNP        0.9 % sodium chloride infusion   IntraVENous PRN Meri Bell APRN - CNP        polyethylene glycol (GLYCOLAX) packet 17 g  17 g Oral Daily PRN Meri Bell APRN - CNP        acetaminophen (TYLENOL) tablet 650 mg  650 mg Oral Q6H PRN Meri Bell APRN - CNP        Or    acetaminophen (TYLENOL) suppository 650 mg  650 mg Rectal Q6H PRN Meri Bell APRN - CNP        albuterol sulfate HFA (PROVENTIL;VENTOLIN;PROAIR) 108 (90 Base) MCG/ACT inhaler 2 puff  2 puff Inhalation 4x Daily PRN Marixa Champion APRN - CNP        atorvastatin (LIPITOR) tablet 80 mg  80 mg Oral Nightly Marixa Champion APRN - CNP   80 mg at 03/18/25 2056    oxyBUTYnin (DITROPAN-XL) extended release tablet 5 mg  5 mg Oral Daily Marixa Champion APRN - CNP   5 mg at 03/19/25 0904    rOPINIRole (REQUIP) tablet 2 mg  2 mg Oral 4x Daily Marixa Champion APRN - CNP   2 mg at 03/19/25 0904    ipratropium 0.5 mg-albuterol 2.5 mg (DUONEB) nebulizer solution 1 Dose  1 Dose Inhalation Q4H PRN Marixa Champion APRN - CNP        docusate sodium (COLACE) capsule 100 mg  100 mg Oral BID Marixa Champion APRN - CNP   100 mg at 03/19/25 0904    spironolactone (ALDACTONE) tablet 25 mg  25 mg Oral Daily Marixa Champion APRN - CNP   25 mg at 03/19/25 0905    sodium chloride flush 0.9 % injection 5-40 mL  5-40 mL IntraVENous 2 times per day Marixa Champion APRN - CNP   10 mL at 03/19/25 0905    sodium chloride flush 0.9 % injection 5-40 mL  5-40 mL IntraVENous PRN Marixa Champion APRN - CNP        0.9 % sodium chloride infusion   IntraVENous PRN Marixa Champion APRN - CNP        warfarin placeholder: dosing by pharmacy   Oral RX Placeholder Marixa Champion APRN - CNP        sotalol (BETAPACE) tablet 80 mg  80 mg Oral Q12H Cailin Rodriguez DO   80 mg at 03/19/25 0904    tamsulosin (FLOMAX) capsule 0.8 mg  0.8 mg Oral Nightly 
weight loss.  [] Class I - 30.0 to 34.9 kg/m2  [] Class II - 35.0 to 39.9 kg/m2  [x] Class III - >=40 kg/m2 (AKA severe/morbid/massive)   [] Super Obesity  - > 50% kg/m2  [] Super Super Obesity  - > 60% kg/m2    Ongoing threat to life and/or bodily function without ongoing treatment due to:  gregoria, arrhythmia    Consults:      IP CONSULT TO HOSPITALIST  IP CONSULT TO PHARMACY  IP CONSULT TO RESPIRATORY CARE      Cardiology consulted and appreciated.  Available consultant notes from yesterday and today were reviewed on 3/18/2025, w/ plan for continued inpatient w/up and management - as above    --------------------------------------------------    Medications:        Infusion Medications    sodium chloride 50 mL/hr at 03/18/25 0931    sodium chloride      sodium chloride       Patient on Coumadin, requiring close serial monitoring of serial labs including INR for therapeutic levels and dose adjustments as well as supratherapeutic toxicity w/ CBC for evidence of anemia/bleeding, as documented in this note and/or the medical record, personally reviewed by me on 3/18/2025    Scheduled Medications    warfarin  5 mg Oral Once    sodium chloride flush  5-40 mL IntraVENous 2 times per day    atorvastatin  80 mg Oral Nightly    oxyBUTYnin  5 mg Oral Daily    rOPINIRole  2 mg Oral 4x Daily    docusate sodium  100 mg Oral BID    spironolactone  25 mg Oral Daily    sodium chloride flush  5-40 mL IntraVENous 2 times per day    warfarin placeholder: dosing by pharmacy   Oral RX Placeholder    sotalol  80 mg Oral Q12H    tamsulosin  0.8 mg Oral Nightly    metoprolol succinate  12.5 mg Oral Daily     PRN Meds: HYDROcodone-acetaminophen, sodium chloride flush, sodium chloride, polyethylene glycol, acetaminophen **OR** acetaminophen, albuterol sulfate HFA, ipratropium 0.5 mg-albuterol 2.5 mg, sodium chloride flush, sodium chloride      Physical Exam Performed:      General appearance:  No apparent distress.  Sitting in 
pulsation Normal  The carotid upstroke is normal in amplitude and contour without delay or bruit  Peripheral pulses are symmetrical and full   Abdomen:  No masses or tenderness  Bowel sounds present  Extremities:   No cyanosis or clubbing   No lower extremity edema   Skin: warm and dry  Neurological:  Alert and oriented  Moves all extremities well  No abnormalities of mood, affect, memory, mentation, or behavior are noted    Data  ECG 3/17/2025  Atrial fibrillation rate 83    ECHO 1/15/2025    Left Ventricle: Normal left ventricular systolic function with a visually estimated EF of 55 - 60%. Left ventricle size is normal. Unable to assess wall thickness. Normal wall motion. Diastolic dysfunction present with increased LAP with normal LV EF.    Aortic Valve: Mild regurgitation.    Image quality is technically difficult. Contrast used: Lumason. Technically difficult study due to patient's body habitus.      All labs and testing reviewed.  Lab Review     Renal Profile:   Lab Results   Component Value Date/Time    CREATININE 1.4 03/17/2025 09:03 AM    BUN 35 03/17/2025 09:03 AM     03/17/2025 09:03 AM    K 4.7 03/17/2025 09:03 AM    K 4.1 01/15/2025 04:31 AM     03/17/2025 09:03 AM    CO2 18 03/17/2025 09:03 AM     CBC:    Lab Results   Component Value Date/Time    WBC 7.4 03/17/2025 09:03 AM    RBC 4.91 03/17/2025 09:03 AM    HGB 14.3 03/17/2025 09:03 AM    HCT 42.5 03/17/2025 09:03 AM    MCV 86.6 03/17/2025 09:03 AM    RDW 16.5 03/17/2025 09:03 AM     03/17/2025 09:03 AM     BNP:  No results found for: \"BNP\"  Fasting Lipid Panel:    Lab Results   Component Value Date/Time    CHOL 193 05/06/2019 09:35 AM    HDL 66 01/14/2025 08:33 AM    HDL 48 08/20/2011 09:44 AM    TRIG 106 05/06/2019 09:35 AM     Cardiac Enzymes:  CK/MbTroponin  Lab Results   Component Value Date/Time    TROPONINI <0.01 02/25/2023 12:57 PM     PT/ INR   Lab Results   Component Value Date/Time    INR 2.18 03/17/2025 09:03 AM    INR 
discharge from EP perspective  Discussed plan at length with patient     Time spent 50 minutes on plan and care    Meri Bell APRN-CNP  [facility]  [phone]

## 2025-03-20 NOTE — DISCHARGE SUMMARY
taken as pain becomes manageable  Associated Diagnoses: Chronic low back pain, unspecified back pain laterality, unspecified whether sciatica present      sotalol (BETAPACE) 80 MG tablet Take 1 tablet by mouth every 12 hours  Qty: 60 tablet, Refills: 3           Current Discharge Medication List        CONTINUE these medications which have CHANGED    Details   metoprolol succinate (TOPROL XL) 25 MG extended release tablet Take 0.5 tablets by mouth daily  Qty: 30 tablet, Refills: 3           Current Discharge Medication List        CONTINUE these medications which have NOT CHANGED    Details   oxyBUTYnin (DITROPAN-XL) 5 MG extended release tablet Take 1 tablet by mouth daily      atorvastatin (LIPITOR) 80 MG tablet Take 1 tablet by mouth nightly  Qty: 90 tablet, Refills: 1    Associated Diagnoses: Hyperlipidemia, unspecified hyperlipidemia type      spironolactone (ALDACTONE) 25 MG tablet Take 1 tablet by mouth daily  Qty: 90 tablet, Refills: 1    Associated Diagnoses: Chronic heart failure with preserved ejection fraction (HFpEF) ([facility])      docusate sodium (COLACE) 100 MG capsule Take 1 capsule by mouth 2 times daily      rOPINIRole (REQUIP) 2 MG tablet Take 1 tablet by mouth 4 times daily      tamsulosin (FLOMAX) 0.4 MG capsule Take 1 capsule by mouth 2 times daily  Qty: 180 capsule, Refills: 1    Associated Diagnoses: Frequent urination      warfarin (COUMADIN) 5 MG tablet Take 1 tablet by mouth daily  Qty: 90 tablet, Refills: 1      Multiple Vitamin (THERA) TABS Take 1 tablet by mouth daily      SAW PALMETTO Take by mouth daily      torsemide (DEMADEX) 10 MG tablet Take 1 tablet by mouth as needed (swelling)  Qty: 30 tablet, Refills: 3    Associated Diagnoses: Chronic heart failure with preserved ejection fraction (HFpEF) ([facility])      Handicap Placard MISC by Does not apply route lifetime  Qty: 1 each, Refills: 0    Associated Diagnoses: Neuropathy involving both lower extremities; Arthritis      sildenafil

## 2025-03-21 ENCOUNTER — CARE COORDINATION (OUTPATIENT)
Dept: CASE MANAGEMENT | Age: 70
End: 2025-03-21

## 2025-03-21 LAB
EKG ATRIAL RATE: 75 BPM
EKG DIAGNOSIS: NORMAL
EKG P AXIS: 55 DEGREES
EKG P-R INTERVAL: 210 MS
EKG Q-T INTERVAL: 448 MS
EKG QRS DURATION: 146 MS
EKG QTC CALCULATION (BAZETT): 500 MS
EKG R AXIS: -42 DEGREES
EKG T AXIS: 19 DEGREES
EKG VENTRICULAR RATE: 75 BPM

## 2025-03-21 NOTE — CARE COORDINATION
Care Transitions Note    Initial Call - Call within 2 business days of discharge: Yes    Patient Current Location:  Home: [address] [place] [address]  [address]  [facility] [address]    Care Transition Nurse contacted the spouse/partner  by telephone to perform post hospital discharge assessment, verified name and  as identifiers.  Provided introduction to self, and explanation of the Care Transition Nurse role.    Patient: Nick Hawkins    Patient : 1955   MRN: [id number]    Reason for Admission: atrial fib s/p Betapace dosing  Discharge Date: 3/20/25  RURS: Readmission Risk Score: 12.2      Last Discharge Facility       Date Complaint Diagnosis Description Type Department Provider    3/17/25  Paroxysmal atrial fibrillation (HCC) ... Admission (Discharged) Blaine Boyd MD            Was this an external facility discharge? No    Additional needs identified to be addressed with provider   High priority: please assist with HFU scheduling if needed. Patient was instructed by Dr Rodriguez for inpatient admission for cardioversion and sotalol dose monitoring. Patient d/c from [facility] on 3/20/25. Patient stated he would call office and schedule follow up if needed. Declined assistance from CTN with scheduling         Method of communication with provider: chart routing.    Patients top risk factors for readmission: functional physical ability    Interventions to address risk factors:   Review of patient management of conditions/medications: ventricular tachyarrhythmia    Care Summary Note: Patient's wife, Amber answered and call given to patient. verified . Patient pleasant and agreeable to transition call. Doing well and happy to be home. Discussed recent hospitalization, new and changed prescriptions. Stated he had \"kidney pain\" and taking prescription medication with good relief. Aware to take half tablet of metoprolol succinate daily. Declined full medication reconciliation. Discussed importance of checking

## 2025-03-24 ENCOUNTER — ANTI-COAG VISIT (OUTPATIENT)
Dept: PHARMACY | Age: 70
End: 2025-03-24
Payer: MEDICARE

## 2025-03-24 DIAGNOSIS — I48.19 PERSISTENT ATRIAL FIBRILLATION (HCC): Primary | ICD-10-CM

## 2025-03-24 LAB
INTERNATIONAL NORMALIZATION RATIO, POC: 3
PROTHROMBIN TIME, POC: 0

## 2025-03-24 PROCEDURE — 99211 OFF/OP EST MAY X REQ PHY/QHP: CPT | Performed by: PHARMACIST

## 2025-03-24 PROCEDURE — 85610 PROTHROMBIN TIME: CPT | Performed by: PHARMACIST

## 2025-03-24 NOTE — PROGRESS NOTES
Mr. Naeem Hawkins is here for management of anticoagulation for AFib.   PMH also significant for CHF, HLD, HTN.   He presents today w/out complaint.  Pt verifies dosing regimen as listed above.   Pt denies s/s bleeding/bruising/swelling/SOB.  No BRBPR. No melena.  Address missed doses  Reviewed pt medication list  No changes in RX/OTCs/Herbal medications.  Reviewed dietary concerns  Address EToH and tobacco use.    Patient had previously been managed here at clinic, moved to [place] in .  Patient recently moved back to [facility] from [place].    Recently stopped diltiazem due to low blood pressure.    Will be admitted to hospital 3/17-3/20 for initiation of sotalol.  INR remained in range while admitted, stayed on same schedule    INR remains in range today.  No other changes since hospital per pt.      INR 3.0 is within therapeutic range of 2-3.  Recommend to continue dose of 7.5 mg Mon/Wed/Fri and 5 mg all other days   Patient has 5 mg tablets.  Will continue to monitor and check INR in 4 weeks.  Dosing reminder card given with phone number, appointment date and time.   Return to clinic: 25 @  1045 AM (after scheduled procedure at [facility])    Bruno Siddiqi, PharmD 2:44 PM EDT 3/24/25    For Pharmacy Admin Tracking Only    Intervention Detail: Adherence Monitorin  Total # of Interventions Recommended: 1  Total # of Interventions Accepted: 1  Time Spent (min): 15

## 2025-03-28 ENCOUNTER — CARE COORDINATION (OUTPATIENT)
Dept: CASE MANAGEMENT | Age: 70
End: 2025-03-28

## 2025-03-28 NOTE — CARE COORDINATION
Care Transitions Note    Follow Up Call     Patient Current Location:  Home: [address] [place] [address]  [address]  [facility] [address]    Care Transition Nurse contacted the patient by telephone. Verified name and  as identifiers.    Additional needs identified to be addressed with provider   No needs identified         Method of communication with provider: none.    Care Summary Note: spoke to Amber, patient's wife - HIPAA verified in system. Stated patient is doing \"really well\" and increasing his activity. Stated he is about 90% back to baseline. Wife stated he was in shed working around at time of call.   Scheduled for HFU next week and noted in system.  Patient continues having kidney pain on the right flank area, but denied any urinary frequency, foul odor, or pain with urination. Wife to ask for UA test at follow up appt scheduled next week.  Patient continues taking all medications as prescribed. Discussed RPM, but wife stated patient has BP monitor and taking on routine. Results have been \"good\".  Denied any acute needs at present time.  Agreeable to f/u calls.  Educated on the use of urgent care or physician’s 24 hr access line if assistance is needed after hours.    Plan of care updates since last contact:  Review of patient management of conditions/medications:       Advance Care Planning:   Does patient have an Advance Directive: reviewed during previous call, see note. .    Medication Review:  Full medication reconciliation completed during previous call.    Remote Patient Monitoring:  Offered patient enrollment in the Remote Patient Monitoring (RPM) program for in-home monitoring: Deferred at this time because unable to discuss at call; will discuss at next outreach.    Assessments:  Care Transitions Subsequent and Final Call    Subsequent and Final Calls  Care Transitions Interventions  Other Interventions:              Follow Up Appointment:   Reviewed upcoming appointment(s).  Future Appointments

## 2025-04-01 ENCOUNTER — OFFICE VISIT (OUTPATIENT)
Dept: FAMILY MEDICINE CLINIC | Age: 70
End: 2025-04-01

## 2025-04-01 VITALS
HEART RATE: 62 BPM | WEIGHT: 315 LBS | DIASTOLIC BLOOD PRESSURE: 86 MMHG | BODY MASS INDEX: 47.56 KG/M2 | SYSTOLIC BLOOD PRESSURE: 138 MMHG | OXYGEN SATURATION: 97 %

## 2025-04-01 DIAGNOSIS — Z09 HOSPITAL DISCHARGE FOLLOW-UP: Primary | ICD-10-CM

## 2025-04-01 DIAGNOSIS — I47.20 VENTRICULAR TACHYARRHYTHMIA (HCC): ICD-10-CM

## 2025-04-01 DIAGNOSIS — I48.19 PERSISTENT ATRIAL FIBRILLATION (HCC): ICD-10-CM

## 2025-04-01 DIAGNOSIS — Z79.01 CHRONIC ANTICOAGULATION: ICD-10-CM

## 2025-04-01 NOTE — PROGRESS NOTES
Post-Discharge Transitional Care  Follow Up      Nick Hawkins   YOB: 1955    Date of Office Visit:  4/1/2025  Date of Hospital Admission: 3/17/25  Date of Hospital Discharge: 3/20/25  Risk of hospital readmission (high >=14%. Medium >=10%) :Readmission Risk Score: 12.2      Care management risk score Rising risk (score 2-5) and Complex Care (Scores >=6): No Risk Score On File     Non face to face  following discharge, date last encounter closed (first attempt may have been earlier): 03/21/2025    Call initiated 2 business days of discharge: Yes    ASSESSMENT/PLAN:   Hospital discharge follow-up  -     NM DISCHARGE MEDS RECONCILED W/ CURRENT OUTPATIENT MED LIST  Persistent atrial fibrillation (HCC)  Chronic anticoagulation  Ventricular tachyarrhythmia (HCC)  Stable.  Patient presents today for hospital follow-up.  Patient recently admitted for abnormal findings on cardiac monitor and cardioversion.  Patient reports that he will wear the Holter monitor again in June.  Cardioversion was successful.  Patient continues to take Coumadin daily and compliant with Coumadin clinic.  Last INR 3.0.  Patient reports take medications as directed.  Patient has follow-up appointments with cardiologist as well as pulmonologist and vascular surgeon.  Patient reports overall doing well.  Blood pressure noted to be slightly elevated therefore recommendations for patient to check blood pressure over the next week call with readings.  New medications sotalol 80 mg twice a day and decrease metoprolol to half a tablet daily.  We did discuss concerns with elevated blood pressure reading and medications.  Patient to monitor diet and increase exercise as tolerable.  All questions answered no concerns at this time.  Follow-up as recommended.  Medical Decision Making: straightforward  Return if symptoms worsen or fail to improve.    On this date 4/1/2025 I have spent 50 minutes reviewing previous notes, test results and face

## 2025-04-04 ENCOUNTER — CARE COORDINATION (OUTPATIENT)
Dept: CASE MANAGEMENT | Age: 70
End: 2025-04-04

## 2025-04-04 NOTE — CARE COORDINATION
Care Transitions Note    Follow Up Call     Reason for Admission: atrial fib s/p Betapace dosing     Patient Current Location:  Home: [address] [place] [address]  [address]  [facility] [address]    [facility] Care Coordinator contacted the spouse/partner  by telephone. Verified name and  as identifiers.    Additional needs identified to be addressed with provider   No needs identified                 Method of communication with provider: none.    Care Summary Note: Spoke with Nick Hawkins wife Amber who reported that patient is doing fine. Wife stated that patient is not doing daily weights nor following diet or fluid restrictions. Wife stated that patient had HFU with PCP and it went well. Wife report that patient's appetite and fluid intake is good and denied any problems with bowel or bladder. Wife reported that patient is taking all medications as ordered. Wife denied any other needs at this time. Wife instructed to continue to monitor s/s, reporting any that may present to MD immediately for early intervention.Wife is agreeable to f/u calls.     Plan of care updates since last contact:          Advance Care Planning   The patient has the following advanced directives on file:  Advance Directives       Power of  Living Will ACP-Advance Directive ACP-Power of     Not on File Not on File Not on File Not on File            The patient has appointed the following active healthcare agents:    Primary Decision Maker: Amber Hawkins - Spouse - [phone]    Medication Review:  No changes since last call.     Remote Patient Monitoring:  Offered patient enrollment in the Remote Patient Monitoring (RPM) program for in-home monitoring: Deferred at this time because  ; will discuss at next outreach.    Assessments:  Care Transitions Subsequent and Final Call    Subsequent and Final Calls  Do you have any ongoing symptoms?: No  Have your medications changed?: No  Do you have any questions related to your medications?:

## 2025-04-11 ENCOUNTER — CARE COORDINATION (OUTPATIENT)
Dept: CASE MANAGEMENT | Age: 70
End: 2025-04-11

## 2025-04-11 NOTE — CARE COORDINATION
Care Transitions Note    Follow Up Call       Patient: Nick Hawkins                                Patient : 1955   MRN: [id number]                             Reason for Admission: atrial fib s/p Betapace dosing  Discharge Date: 3/20/25       RURS: Readmission Risk Score: 12.2       Patient Current Location:  [facility] Care Coordinator contacted the spouse/partner  by telephone. Verified name and  as identifiers.    Additional needs identified to be addressed with provider   No needs identified                 Method of communication with provider: none.    Care Summary Note:   Spoke with Amber, patients wife. She stated he is doing great. Denies cp, palpitations, cough, fever, sob, chills, wheeze, swelling, nv or dyspnea. She wasn't at home. She didn't know his vs. Taking medication as prescribed. Good appetite and fluid intake. No urinary or bowel issues. No needs at this time.     Plan of care updates since last contact:  Review of patient management of conditions/medications:         Advance Care Planning:   Does patient have an Advance Directive: not on file.  The patient has appointed the following active healthcare agents:    Primary Decision Maker: Amber Hawkins - Spouse - [phone]     Medication Review:  No changes since last call.     Remote Patient Monitoring:  Offered patient enrollment in the Remote Patient Monitoring (RPM) program for in-home monitoring: Deferred at this time because not home; will discuss at next outreach.    Assessments:  Care Transitions Subsequent and Final Call    Subsequent and Final Calls  Do you have any ongoing symptoms?: No  Have your medications changed?: No  Do you have any questions related to your medications?: No  Do you currently have any active services?: No  Do you have any needs or concerns that I can assist you with?: No  Identified Barriers: None  Care Transitions Interventions  Other Interventions:              Follow Up Appointment:   MUNDO

## 2025-04-18 ENCOUNTER — CARE COORDINATION (OUTPATIENT)
Dept: CASE MANAGEMENT | Age: 70
End: 2025-04-18

## 2025-04-18 NOTE — CARE COORDINATION
Care Transitions Note    Final Call     Attempted to reach spouse/partner  for transitions of care follow up.  Unable to reach spouse/partner .      Outreach Attempts:   HIPAA compliant voicemail left for spouse/partner .     Patient graduated from the Care Transitions program on 4/18/25.  Patient/family has the ability to self-manage at this time..      Handoff:   Patient was not referred to the ACM team due to no additional needs identified.       Care Summary Note: Attempted to reach patient via phone for final transition call.  VM left stating purpose of call along with my contact information requesting a return call.    Assessments:  Care Transitions Subsequent and Final Call    Subsequent and Final Calls  Care Transitions Interventions  Other Interventions:              Upcoming Appointments:    Future Appointments         Provider Specialty Dept Phone    4/21/2025 8:00 AM (Arrive by 7:30 AM) [facility] VASC LAB 1 Cardiology [phone]    4/21/2025 10:45 AM [facility] ANTICOAGULATION CLINIC Pharmacy [phone]    6/23/2025 11:00 AM [facility]IT[facility] Cardiology [phone]    7/30/2025 8:30 AM (Arrive by 8:00 AM) [facility] CT MAIN Radiology [phone]    7/30/2025 9:00 AM [facility] PULMONARY FUNCTION TESTING Pulmonary Function Testing [phone]    7/30/2025 10:15 AM Rob Cooper MD Pulmonology [phone]    8/18/2025 9:00 AM Nicholas Erickson MD Cardiology [phone]    8/28/2025 11:00 AM Cailin Rodriguez DO Cardiology [phone]    9/23/2025 8:30 AM Shorty Armenta MD Cardiology [phone]            Marguerite Mendez RN

## 2025-04-21 ENCOUNTER — ANTI-COAG VISIT (OUTPATIENT)
Dept: PHARMACY | Age: 70
End: 2025-04-21
Payer: MEDICARE

## 2025-04-21 ENCOUNTER — HOSPITAL ENCOUNTER (OUTPATIENT)
Age: 70
Discharge: HOME | End: 2025-04-23
Attending: INTERNAL MEDICINE
Payer: MEDICARE

## 2025-04-21 DIAGNOSIS — I87.2 VENOUS INSUFFICIENCY: ICD-10-CM

## 2025-04-21 DIAGNOSIS — R60.0 BILATERAL LOWER EXTREMITY EDEMA: ICD-10-CM

## 2025-04-21 DIAGNOSIS — I48.19 PERSISTENT ATRIAL FIBRILLATION (HCC): Primary | ICD-10-CM

## 2025-04-21 LAB
INTERNATIONAL NORMALIZATION RATIO, POC: 3.9
PROTHROMBIN TIME, POC: 0

## 2025-04-21 PROCEDURE — 93970 EXTREMITY STUDY: CPT

## 2025-04-21 PROCEDURE — 99211 OFF/OP EST MAY X REQ PHY/QHP: CPT | Performed by: PHARMACIST

## 2025-04-21 PROCEDURE — 85610 PROTHROMBIN TIME: CPT | Performed by: PHARMACIST

## 2025-04-21 NOTE — PROGRESS NOTES
Mr. Naeem Hawkins is here for management of anticoagulation for AFib.   PMH also significant for CHF, HLD, HTN.   He presents today w/out complaint.  Pt verifies dosing regimen as listed above.   Pt denies s/s bleeding/bruising/swelling/SOB.  No BRBPR. No melena.  Address missed doses  Reviewed pt medication list  No changes in RX/OTCs/Herbal medications.  Reviewed dietary concerns  Address EToH and tobacco use.    Patient had previously been managed here at clinic, moved to [place] in .  Patient recently moved back to [facility] from [place].    No recent changes.  INR elevated today.  INR has been at high end of range recently. Will lower weekly dose.      INR 3.9 is above therapeutic range of 2-3.  Recommend to take 2.5 mg tomorrow(already took today's dose), then reduce dose to 7.5 mg Mon/Fri and 5 mg all other days(5.9% decrease)  Patient has 5 mg tablets.  Will continue to monitor and check INR in 2 weeks.  Dosing reminder card given with phone number, appointment date and time.   Return to clinic: 25 @  2:45 PM     Bruno Siddiqi, PharmD 10:40 AM EDT 25    For Pharmacy Admin Tracking Only    Intervention Detail: Adherence Monitorin and Dose Adjustment: 1, reason: Therapy De-escalation  Total # of Interventions Recommended: 2  Total # of Interventions Accepted: 2  Time Spent (min): 15

## 2025-04-23 LAB
VAS LEFT CFV RFX: 1.7 S
VAS LEFT FV MID RFX: 1.5 S
VAS LEFT GSV AT KNEE DIAM: 4 MM
VAS LEFT GSV AT KNEE RFX: 2 S
VAS LEFT GSV BK DIST DIAM: 2.7 MM
VAS LEFT GSV BK MID DIAM: 2.5 MM
VAS LEFT GSV BK PROX DIAM: 2.8 MM
VAS LEFT GSV BK PROX RFX: 3.6 S
VAS LEFT GSV JUNC DIAM: 8.3 MM
VAS LEFT GSV JUNC RFX: 1.6 S
VAS LEFT GSV THIGH DIST DIAM: 3.7 MM
VAS LEFT GSV THIGH MID DIAM: 4.3 MM
VAS LEFT GSV THIGH PROX DIAM: 6.6 MM
VAS LEFT PERFORATOR DIAM: 2.9 MM
VAS LEFT PFV RFX: 2 S
VAS LEFT SSV DIST DIAM: 2.4 MM
VAS LEFT SSV JUNCTION DIAM: 9.9 MM
VAS LEFT SSV MID DIAM: 3.9 MM
VAS LEFT SSV MID RFX: 1.4 S
VAS LEFT SSV PROX DIAM: 5.3 MM
VAS LEFT SSV PROX RFX: 2.6 S
VAS RIGHT GSV AT KNEE DIAM: 2.5 MM
VAS RIGHT GSV BK DIST DIAM: 2.9 MM
VAS RIGHT GSV BK MID DIAM: 3 MM
VAS RIGHT GSV BK PROX DIAM: 3.2 MM
VAS RIGHT GSV JUNC DIAM: 4.7 MM
VAS RIGHT GSV JUNC RFX: 4 S
VAS RIGHT GSV THIGH DIST DIAM: 3.2 MM
VAS RIGHT GSV THIGH MID DIAM: 3.4 MM
VAS RIGHT GSV THIGH PROX DIAM: 5.8 MM
VAS RIGHT PERFORATOR DIAM: 5 MM
VAS RIGHT SSV DIST DIAM: 2.3 MM
VAS RIGHT SSV JUNCTION DIAM: 6.9 MM
VAS RIGHT SSV MID DIAM: 3.7 MM
VAS RIGHT SSV PROX DIAM: 6.4 MM
VAS RIGHT SSV PROX RFX: 3 S

## 2025-04-28 RX ORDER — SOTALOL HYDROCHLORIDE 80 MG/1
80 TABLET ORAL EVERY 12 HOURS
Qty: 60 TABLET | Refills: 5 | Status: SHIPPED | OUTPATIENT
Start: 2025-04-28

## 2025-04-28 NOTE — TELEPHONE ENCOUNTER
Future appt scheduled due in July  Last appt 4/1  Patient was started on this in the hospital and needs this refilled.

## 2025-04-29 ENCOUNTER — RESULTS FOLLOW-UP (OUTPATIENT)
Dept: CARDIOLOGY CLINIC | Age: 70
End: 2025-04-29

## 2025-05-05 ENCOUNTER — ANTI-COAG VISIT (OUTPATIENT)
Dept: PHARMACY | Age: 70
End: 2025-05-05
Payer: MEDICARE

## 2025-05-05 DIAGNOSIS — I48.19 PERSISTENT ATRIAL FIBRILLATION (HCC): Primary | ICD-10-CM

## 2025-05-05 LAB
INTERNATIONAL NORMALIZATION RATIO, POC: 3.5
PROTHROMBIN TIME, POC: 0

## 2025-05-05 PROCEDURE — 85610 PROTHROMBIN TIME: CPT | Performed by: PHARMACIST

## 2025-05-05 PROCEDURE — 99211 OFF/OP EST MAY X REQ PHY/QHP: CPT | Performed by: PHARMACIST

## 2025-05-12 RX ORDER — OXYBUTYNIN CHLORIDE 5 MG/1
5 TABLET, EXTENDED RELEASE ORAL DAILY
Qty: 30 TABLET | Refills: 2 | Status: SHIPPED | OUTPATIENT
Start: 2025-05-12

## 2025-05-19 ENCOUNTER — ANTI-COAG VISIT (OUTPATIENT)
Dept: PHARMACY | Age: 70
End: 2025-05-19
Payer: MEDICARE

## 2025-05-19 DIAGNOSIS — I48.19 PERSISTENT ATRIAL FIBRILLATION (HCC): Primary | ICD-10-CM

## 2025-05-19 LAB
INTERNATIONAL NORMALIZATION RATIO, POC: 4
PROTHROMBIN TIME, POC: 0

## 2025-05-19 PROCEDURE — 85610 PROTHROMBIN TIME: CPT | Performed by: PHARMACIST

## 2025-05-19 PROCEDURE — 99211 OFF/OP EST MAY X REQ PHY/QHP: CPT | Performed by: PHARMACIST

## 2025-05-19 NOTE — PROGRESS NOTES
Mr. Naeem Hawkins is here for management of anticoagulation for AFib.   PMH also significant for CHF, HLD, HTN.   He presents today w/out complaint.  Pt verifies dosing regimen as listed above.   Pt denies s/s bleeding/bruising/swelling/SOB.  No BRBPR. No melena.  Address missed doses  Reviewed pt medication list  No changes in RX/OTCs/Herbal medications.  Reviewed dietary concerns  Address EToH and tobacco use.    No recent changes.  INR elevated again today despite recent dose decreases.  No change in other medications, no diet changes, etc.    He has been having diarrhea recently which can increase INR.    INR 4.0 is above therapeutic range of 2-3.  Recommend to hold dose tomorrow(already took today's dose), then reduce dose to  5 mg daily (6.7% decrease)  Patient has 5 mg tablets.  Will continue to monitor and check INR in 2 weeks.  Dosing reminder card given with phone number, appointment date and time.   Return to clinic: 25 @  2:45 PM     Bruno Siddiqi PharmD 2:36 PM EDT 25    For Pharmacy Admin Tracking Only    Intervention Detail: Adherence Monitorin and Dose Adjustment: 1, reason: Therapy De-escalation  Total # of Interventions Recommended: 2  Total # of Interventions Accepted: 2  Time Spent (min): 15

## 2025-05-30 DIAGNOSIS — G57.93 NEUROPATHY INVOLVING BOTH LOWER EXTREMITIES: ICD-10-CM

## 2025-05-30 RX ORDER — DULOXETIN HYDROCHLORIDE 60 MG/1
60 CAPSULE, DELAYED RELEASE ORAL DAILY
Qty: 90 CAPSULE | Refills: 0 | Status: SHIPPED | OUTPATIENT
Start: 2025-05-30

## 2025-05-30 RX ORDER — WARFARIN SODIUM 5 MG/1
5 TABLET ORAL DAILY
Qty: 90 TABLET | Refills: 1 | Status: SHIPPED | OUTPATIENT
Start: 2025-05-30

## 2025-05-30 NOTE — TELEPHONE ENCOUNTER
Pt spouse called and requested these. I explained to her that one of them (duloxetine) looked like they were d/c, and she said to just ask Imani if this pt should be on these or not.       Future appt scheduled 07/14/2025 (spouse wants them both to be seen at the same time                          Last appt 04/01/2025      Last Written     warfarin (COUMADIN) 5 MG tablet   06/07/2023  #90  1 RF     DULoxetine (CYMBALTA) 60 MG extended release capsule    06/30/2023  #90  0 RF

## 2025-06-02 ENCOUNTER — ANTI-COAG VISIT (OUTPATIENT)
Dept: PHARMACY | Age: 70
End: 2025-06-02
Payer: MEDICARE

## 2025-06-02 DIAGNOSIS — I48.19 PERSISTENT ATRIAL FIBRILLATION (HCC): Primary | ICD-10-CM

## 2025-06-02 LAB
INTERNATIONAL NORMALIZATION RATIO, POC: 2.8
PROTHROMBIN TIME, POC: 0

## 2025-06-02 PROCEDURE — 99211 OFF/OP EST MAY X REQ PHY/QHP: CPT | Performed by: PHARMACIST

## 2025-06-02 PROCEDURE — 85610 PROTHROMBIN TIME: CPT | Performed by: PHARMACIST

## 2025-06-02 NOTE — PROGRESS NOTES
MrRedd Hawkins is here for management of anticoagulation for AFib.   PMH also significant for CHF, HLD, HTN.   He presents today w/out complaint.  Pt verifies dosing regimen as listed above.   Pt denies s/s bleeding/bruising/swelling/SOB.  No BRBPR. No melena.  Address missed doses  Reviewed pt medication list  No changes in RX/OTCs/Herbal medications.  Reviewed dietary concerns  Address EToH and tobacco use.    No recent changes.  INR now back in range after recent dose decreases.  No change in other medications, no diet changes, etc.      INR 2.8 is within therapeutic range of 2-3.  Recommend to continue dose of 5 mg daily.  Patient has 5 mg tablets.  Will continue to monitor and check INR in 4 weeks.  Dosing reminder card given with phone number, appointment date and time.   Return to clinic: 25 @  2:45 PM     Bruno Siddiqi, PharmD 2:35 PM EDT 25    For Pharmacy Admin Tracking Only    Intervention Detail: Adherence Monitorin  Total # of Interventions Recommended: 1  Total # of Interventions Accepted: 1  Time Spent (min): 15

## 2025-06-30 ENCOUNTER — ANTI-COAG VISIT (OUTPATIENT)
Dept: PHARMACY | Age: 70
End: 2025-06-30
Payer: MEDICARE

## 2025-06-30 DIAGNOSIS — I48.19 PERSISTENT ATRIAL FIBRILLATION (HCC): Primary | ICD-10-CM

## 2025-06-30 LAB
INTERNATIONAL NORMALIZATION RATIO, POC: 2.6
PROTHROMBIN TIME, POC: 0

## 2025-06-30 PROCEDURE — 85610 PROTHROMBIN TIME: CPT | Performed by: PHARMACIST

## 2025-06-30 PROCEDURE — 99211 OFF/OP EST MAY X REQ PHY/QHP: CPT | Performed by: PHARMACIST

## 2025-06-30 NOTE — PROGRESS NOTES
MrRedd Hawkins is here for management of anticoagulation for AFib.   PMH also significant for CHF, HLD, HTN.   He presents today w/out complaint.  Pt verifies dosing regimen as listed above.   Pt denies s/s bleeding/bruising/swelling/SOB.  No BRBPR. No melena.  Address missed doses  Reviewed pt medication list  No changes in RX/OTCs/Herbal medications.  Reviewed dietary concerns  Address EToH and tobacco use.    No recent changes.      INR 2.6 is within therapeutic range of 2-3.  Recommend to continue dose of 5 mg daily.  Patient has 5 mg tablets.  Will continue to monitor and check INR in 4 weeks.  Dosing reminder card given with phone number, appointment date and time.   Return to clinic: 25 @  2:45 PM     Bruno Siddiqi PharmD 2:27 PM EDT 25    For Pharmacy Admin Tracking Only    Intervention Detail: Adherence Monitorin  Total # of Interventions Recommended: 1  Total # of Interventions Accepted: 1  Time Spent (min): 15

## 2025-07-11 RX ORDER — ROPINIROLE 2 MG/1
2 TABLET, FILM COATED ORAL 4 TIMES DAILY
Qty: 90 TABLET | Refills: 3 | Status: SHIPPED | OUTPATIENT
Start: 2025-07-11

## 2025-07-12 DIAGNOSIS — I50.32 CHRONIC HEART FAILURE WITH PRESERVED EJECTION FRACTION (HFPEF) (HCC): ICD-10-CM

## 2025-07-14 ENCOUNTER — OFFICE VISIT (OUTPATIENT)
Dept: FAMILY MEDICINE CLINIC | Age: 70
End: 2025-07-14

## 2025-07-14 VITALS
WEIGHT: 315 LBS | BODY MASS INDEX: 47.84 KG/M2 | OXYGEN SATURATION: 94 % | TEMPERATURE: 98 F | SYSTOLIC BLOOD PRESSURE: 133 MMHG | DIASTOLIC BLOOD PRESSURE: 72 MMHG | HEART RATE: 80 BPM

## 2025-07-14 DIAGNOSIS — J45.20 MILD INTERMITTENT ASTHMA WITHOUT COMPLICATION: ICD-10-CM

## 2025-07-14 DIAGNOSIS — G57.93 NEUROPATHY INVOLVING BOTH LOWER EXTREMITIES: ICD-10-CM

## 2025-07-14 DIAGNOSIS — E55.9 VITAMIN D DEFICIENCY: ICD-10-CM

## 2025-07-14 DIAGNOSIS — R73.03 PREDIABETES: ICD-10-CM

## 2025-07-14 DIAGNOSIS — F41.9 ANXIETY: ICD-10-CM

## 2025-07-14 DIAGNOSIS — E78.2 MIXED HYPERLIPIDEMIA: ICD-10-CM

## 2025-07-14 DIAGNOSIS — N18.9 CHRONIC KIDNEY DISEASE, UNSPECIFIED CKD STAGE: ICD-10-CM

## 2025-07-14 DIAGNOSIS — Z23 NEED FOR TDAP VACCINATION: ICD-10-CM

## 2025-07-14 DIAGNOSIS — I48.19 PERSISTENT ATRIAL FIBRILLATION (HCC): Primary | ICD-10-CM

## 2025-07-14 DIAGNOSIS — G47.33 OSA (OBSTRUCTIVE SLEEP APNEA): ICD-10-CM

## 2025-07-14 DIAGNOSIS — M19.90 ARTHRITIS: ICD-10-CM

## 2025-07-14 DIAGNOSIS — I50.32 CHRONIC HEART FAILURE WITH PRESERVED EJECTION FRACTION (HFPEF) (HCC): ICD-10-CM

## 2025-07-14 DIAGNOSIS — Z23 NEED FOR PNEUMOCOCCAL 20-VALENT CONJUGATE VACCINATION: ICD-10-CM

## 2025-07-14 DIAGNOSIS — I10 ESSENTIAL HYPERTENSION: ICD-10-CM

## 2025-07-14 DIAGNOSIS — R91.8 PULMONARY NODULES: ICD-10-CM

## 2025-07-14 PROBLEM — R06.02 SOB (SHORTNESS OF BREATH) ON EXERTION: Status: RESOLVED | Noted: 2023-02-17 | Resolved: 2025-07-14

## 2025-07-14 PROBLEM — I95.2 HYPOTENSION DUE TO DRUGS: Status: RESOLVED | Noted: 2023-02-28 | Resolved: 2025-07-14

## 2025-07-14 PROBLEM — Z98.890 STATUS POST CARDIAC CATHETERIZATION: Status: RESOLVED | Noted: 2025-01-14 | Resolved: 2025-07-14

## 2025-07-14 PROBLEM — R07.9 CHEST PAIN: Status: RESOLVED | Noted: 2025-01-15 | Resolved: 2025-07-14

## 2025-07-14 PROBLEM — Z79.899 MEDICATION MANAGEMENT: Status: RESOLVED | Noted: 2020-09-10 | Resolved: 2025-07-14

## 2025-07-14 PROBLEM — R42 DIZZINESS: Status: RESOLVED | Noted: 2025-02-10 | Resolved: 2025-07-14

## 2025-07-14 PROBLEM — R53.83 FATIGUE: Status: RESOLVED | Noted: 2025-02-10 | Resolved: 2025-07-14

## 2025-07-14 PROBLEM — J45.41 MODERATE PERSISTENT ASTHMA WITH EXACERBATION: Status: RESOLVED | Noted: 2023-02-18 | Resolved: 2025-07-14

## 2025-07-14 PROBLEM — R00.1 SYMPTOMATIC BRADYCARDIA: Status: RESOLVED | Noted: 2023-02-26 | Resolved: 2025-07-14

## 2025-07-14 PROBLEM — J18.9 PNEUMONIA DUE TO INFECTIOUS ORGANISM: Status: RESOLVED | Noted: 2023-02-17 | Resolved: 2025-07-14

## 2025-07-14 PROBLEM — R55 SYNCOPE AND COLLAPSE: Status: RESOLVED | Noted: 2023-02-25 | Resolved: 2025-07-14

## 2025-07-14 LAB
ALBUMIN SERPL-MCNC: 4.2 G/DL (ref 3.4–5)
ALBUMIN/GLOB SERPL: 1.7 {RATIO} (ref 1.1–2.2)
ALP SERPL-CCNC: 84 U/L (ref 40–129)
ALT SERPL-CCNC: 39 U/L (ref 10–40)
ANION GAP SERPL CALCULATED.3IONS-SCNC: 12 MMOL/L (ref 3–16)
AST SERPL-CCNC: 33 U/L (ref 15–37)
BILIRUB SERPL-MCNC: 0.5 MG/DL (ref 0–1)
BUN SERPL-MCNC: 23 MG/DL (ref 7–20)
CALCIUM SERPL-MCNC: 9.4 MG/DL (ref 8.3–10.6)
CHLORIDE SERPL-SCNC: 109 MMOL/L (ref 99–110)
CO2 SERPL-SCNC: 21 MMOL/L (ref 21–32)
CREAT SERPL-MCNC: 1.2 MG/DL (ref 0.8–1.3)
EST. AVERAGE GLUCOSE BLD GHB EST-MCNC: 116.9 MG/DL
GFR SERPLBLD CREATININE-BSD FMLA CKD-EPI: 64 ML/MIN/{1.73_M2}
GLUCOSE SERPL-MCNC: 106 MG/DL (ref 70–99)
HBA1C MFR BLD: 5.7 %
POTASSIUM SERPL-SCNC: 4.5 MMOL/L (ref 3.5–5.1)
PROT SERPL-MCNC: 6.7 G/DL (ref 6.4–8.2)
SODIUM SERPL-SCNC: 142 MMOL/L (ref 136–145)

## 2025-07-14 RX ORDER — SPIRONOLACTONE 25 MG/1
25 TABLET ORAL DAILY
Qty: 90 TABLET | Refills: 0 | Status: SHIPPED | OUTPATIENT
Start: 2025-07-14

## 2025-07-14 ASSESSMENT — ENCOUNTER SYMPTOMS
SORE THROAT: 0
RHINORRHEA: 0
COUGH: 0
ABDOMINAL PAIN: 0
NAUSEA: 0
SHORTNESS OF BREATH: 0
ABDOMINAL DISTENTION: 0
CHEST TIGHTNESS: 0
VOMITING: 0
WHEEZING: 0
DIARRHEA: 0

## 2025-07-14 NOTE — PROGRESS NOTES
CHIEF COMPLAINT  Chief Complaint   Patient presents with    Check-Up        HPI   Ncik Hawkins is a 70 y.o. male who presents to the office for 6 month check up.  Patient reports doing well.  Patient denies any episodes of dizziness, lightheadedness, chest pain or shortness of breath.  Patient denies any nausea, vomiting, diarrhea.  No dark or tarry stools.  Patient continues to go to Coumadin clinic and follow-up with specialist as directed.  Patient does report taking medications daily.  No other complaints, modifying factors or associated symptoms.     Nursing notes reviewed.   Past Medical History:   Diagnosis Date    A-fib (HCC)     Acute congestive heart failure (HCC) 02/17/2023    Anxiety     Asthma     Atrial fibrillation (HCC)     BPH     prostate 5/05,     Chest pain 01/15/2025    CHF (congestive heart failure) (HCC)     Dermatitis due to sun     severe    Dizziness 02/10/2025    Fatigue 02/10/2025    Hearing loss     decreased hearing left ear    Hyperlipidemia     Hypertension     pos GXT 3/01, neg GXT 3/07    Hypotension due to drugs 02/28/2023    left TSR 06/19/2012    Medication management 09/10/2020    Moderate persistent asthma with exacerbation 02/18/2023    Narcolepsy     Osteoarthritis     Pneumonia     Pneumonia due to infectious organism 02/17/2023    Sleep apnea     bipap    SOB (shortness of breath) on exertion 02/17/2023    Status post cardiac catheterization 01/14/2025    Superficial peroneal nerve neuropathy     bilateral    Symptomatic bradycardia 02/26/2023    Syncope and collapse 02/25/2023    Uncontrolled hypertension 01/05/2016    Urinary incontinence 01/01/2024     Past Surgical History:   Procedure Laterality Date    APPENDECTOMY      CARDIAC CATH PROCEDURE  1/14/2025    CARDIAC PROCEDURE N/A 1/14/2025    Left heart cath / coronary angiography performed by Tai Estrella MD at [facility] CARDIAC CATH LAB    CHOLECYSTECTOMY      COLONOSCOPY  12/19/2011    KNEE SURGERY Right

## 2025-07-14 NOTE — TELEPHONE ENCOUNTER
Last Office Visit: 2/10/2025 Provider: JEFFREY      Next Office Visit: 8/18/2025 Provider: JEFFREY      Lab orders needed? no   Encounter provider correct? Yes If not, change provider  Script changes since last refill? no    LAST LABS:   CMP:  Lab Results   Component Value Date     03/20/2025    K 4.9 03/20/2025     (H) 03/20/2025    CO2 24 03/20/2025    BUN 24 (H) 03/20/2025    CREATININE 1.2 03/20/2025    GLUCOSE 100 (H) 03/20/2025    CALCIUM 9.2 03/20/2025    BILITOT 0.7 01/14/2025    ALKPHOS 94 01/14/2025    AST 70 (H) 01/14/2025    ALT 10 01/14/2025    LABGLOM 64 03/20/2025    GFRAA >60 12/07/2021    AGRATIO 1.3 01/14/2025    GLOB 3.0 05/06/2019

## 2025-07-14 NOTE — PATIENT INSTRUCTIONS
Please read the healthy family handout that you were given and share it with your family.       Please compare this printed medication list with your medications at home to be sure they are the same.  If you have any medications that are different please contact us immediately at [phone].     Also review your allergies that we have listed, these may also include medications that you have not been able to tolerate, make sure everything listed is correct. If you have any allergies that are different please contact us immediately at [phone].     You may receive a survey in the mail or by email asking about your experience during your visit today. Please complete and return to us so we know how we are serving you.

## 2025-07-18 RX ORDER — METOPROLOL SUCCINATE 25 MG/1
12.5 TABLET, EXTENDED RELEASE ORAL DAILY
Qty: 90 TABLET | Refills: 1 | Status: SHIPPED | OUTPATIENT
Start: 2025-07-18

## 2025-07-28 ENCOUNTER — ANTI-COAG VISIT (OUTPATIENT)
Dept: PHARMACY | Age: 70
End: 2025-07-28
Payer: MEDICARE

## 2025-07-28 DIAGNOSIS — I48.19 PERSISTENT ATRIAL FIBRILLATION (HCC): Primary | ICD-10-CM

## 2025-07-28 LAB
INTERNATIONAL NORMALIZATION RATIO, POC: 2.7
PROTHROMBIN TIME, POC: 0

## 2025-07-28 PROCEDURE — 99211 OFF/OP EST MAY X REQ PHY/QHP: CPT | Performed by: PHARMACIST

## 2025-07-28 PROCEDURE — 85610 PROTHROMBIN TIME: CPT | Performed by: PHARMACIST

## 2025-07-28 NOTE — PROGRESS NOTES
MrRedd Hawkins is here for management of anticoagulation for AFib.   PMH also significant for CHF, HLD, HTN.   He presents today w/out complaint.  Pt verifies dosing regimen as listed above.   Pt denies s/s bleeding/bruising/swelling/SOB.  No BRBPR. No melena.  Address missed doses  Reviewed pt medication list  No changes in RX/OTCs/Herbal medications.  Reviewed dietary concerns  Address EToH and tobacco use.    No recent changes.  He has been having more bruising recently.    INR 2.7 is within therapeutic range of 2-3.  Recommend to continue dose of 5 mg daily.  Patient has 5 mg tablets.  Will continue to monitor and check INR in 4 weeks.  Dosing reminder card given with phone number, appointment date and time.   Return to clinic: 25 @  2:45 PM     Bruno Siddiqi, PharmD 2:33 PM EDT 25    For Pharmacy Admin Tracking Only    Intervention Detail: Adherence Monitorin  Total # of Interventions Recommended: 1  Total # of Interventions Accepted: 1  Time Spent (min): 15

## 2025-08-06 DIAGNOSIS — E78.5 HYPERLIPIDEMIA, UNSPECIFIED HYPERLIPIDEMIA TYPE: ICD-10-CM

## 2025-08-07 RX ORDER — ATORVASTATIN CALCIUM 80 MG/1
80 TABLET, FILM COATED ORAL NIGHTLY
Qty: 90 TABLET | Refills: 1 | Status: SHIPPED | OUTPATIENT
Start: 2025-08-07

## 2025-08-07 RX ORDER — OXYBUTYNIN CHLORIDE 5 MG/1
5 TABLET, EXTENDED RELEASE ORAL DAILY
Qty: 30 TABLET | Refills: 2 | Status: SHIPPED | OUTPATIENT
Start: 2025-08-07

## 2025-08-18 ENCOUNTER — OFFICE VISIT (OUTPATIENT)
Dept: CARDIOLOGY CLINIC | Age: 70
End: 2025-08-18
Payer: MEDICARE

## 2025-08-18 VITALS
OXYGEN SATURATION: 94 % | DIASTOLIC BLOOD PRESSURE: 84 MMHG | HEART RATE: 66 BPM | WEIGHT: 315 LBS | BODY MASS INDEX: 44.1 KG/M2 | HEIGHT: 71 IN | SYSTOLIC BLOOD PRESSURE: 132 MMHG

## 2025-08-18 DIAGNOSIS — I45.10 RBBB: ICD-10-CM

## 2025-08-18 DIAGNOSIS — R06.02 SOB (SHORTNESS OF BREATH): ICD-10-CM

## 2025-08-18 DIAGNOSIS — Z87.891 HISTORY OF TOBACCO ABUSE: ICD-10-CM

## 2025-08-18 DIAGNOSIS — G47.33 OSA (OBSTRUCTIVE SLEEP APNEA): ICD-10-CM

## 2025-08-18 DIAGNOSIS — I50.32 CHRONIC HEART FAILURE WITH PRESERVED EJECTION FRACTION (HFPEF) (HCC): ICD-10-CM

## 2025-08-18 DIAGNOSIS — I48.19 PERSISTENT ATRIAL FIBRILLATION (HCC): Primary | ICD-10-CM

## 2025-08-18 DIAGNOSIS — E78.2 MIXED HYPERLIPIDEMIA: ICD-10-CM

## 2025-08-18 DIAGNOSIS — I10 ESSENTIAL HYPERTENSION: ICD-10-CM

## 2025-08-18 DIAGNOSIS — R60.0 LOCALIZED EDEMA: ICD-10-CM

## 2025-08-18 DIAGNOSIS — I77.810 ASCENDING AORTA DILATATION: ICD-10-CM

## 2025-08-18 PROCEDURE — 1124F ACP DISCUSS-NO DSCNMKR DOCD: CPT | Performed by: INTERNAL MEDICINE

## 2025-08-18 PROCEDURE — 3017F COLORECTAL CA SCREEN DOC REV: CPT | Performed by: INTERNAL MEDICINE

## 2025-08-18 PROCEDURE — G8427 DOCREV CUR MEDS BY ELIG CLIN: HCPCS | Performed by: INTERNAL MEDICINE

## 2025-08-18 PROCEDURE — 3075F SYST BP GE 130 - 139MM HG: CPT | Performed by: INTERNAL MEDICINE

## 2025-08-18 PROCEDURE — 3079F DIAST BP 80-89 MM HG: CPT | Performed by: INTERNAL MEDICINE

## 2025-08-18 PROCEDURE — 1159F MED LIST DOCD IN RCRD: CPT | Performed by: INTERNAL MEDICINE

## 2025-08-18 PROCEDURE — G8417 CALC BMI ABV UP PARAM F/U: HCPCS | Performed by: INTERNAL MEDICINE

## 2025-08-18 PROCEDURE — 1036F TOBACCO NON-USER: CPT | Performed by: INTERNAL MEDICINE

## 2025-08-18 PROCEDURE — 99214 OFFICE O/P EST MOD 30 MIN: CPT | Performed by: INTERNAL MEDICINE

## 2025-08-18 RX ORDER — SPIRONOLACTONE 25 MG/1
25 TABLET ORAL DAILY
Qty: 90 TABLET | Refills: 3 | Status: SHIPPED | OUTPATIENT
Start: 2025-08-18

## 2025-08-25 ENCOUNTER — ANTI-COAG VISIT (OUTPATIENT)
Dept: PHARMACY | Age: 70
End: 2025-08-25
Payer: MEDICARE

## 2025-08-25 DIAGNOSIS — G57.93 NEUROPATHY INVOLVING BOTH LOWER EXTREMITIES: ICD-10-CM

## 2025-08-25 DIAGNOSIS — I48.19 PERSISTENT ATRIAL FIBRILLATION (HCC): Primary | ICD-10-CM

## 2025-08-25 LAB
INTERNATIONAL NORMALIZATION RATIO, POC: 3
PROTHROMBIN TIME, POC: NORMAL

## 2025-08-25 PROCEDURE — 99211 OFF/OP EST MAY X REQ PHY/QHP: CPT | Performed by: PHARMACIST

## 2025-08-25 PROCEDURE — 85610 PROTHROMBIN TIME: CPT | Performed by: PHARMACIST

## 2025-08-25 RX ORDER — SOTALOL HYDROCHLORIDE 80 MG/1
80 TABLET ORAL EVERY 12 HOURS
Qty: 180 TABLET | Refills: 1 | Status: SHIPPED | OUTPATIENT
Start: 2025-08-25

## 2025-08-25 RX ORDER — DULOXETIN HYDROCHLORIDE 60 MG/1
CAPSULE, DELAYED RELEASE ORAL DAILY
Qty: 90 CAPSULE | Refills: 0 | Status: SHIPPED | OUTPATIENT
Start: 2025-08-25

## (undated) DEVICE — CATHETER DIAG 5FR L100CM LUMN ID0.047IN JL4 CRV 0 SIDE H

## (undated) DEVICE — DEVICE INFL W/ HEM VLV TORQ

## (undated) DEVICE — GUIDEWIRE VASC L150CM DIA0.035IN FLX END L7CM J 3MM PTFE

## (undated) DEVICE — Z DISCONTINUED USE 2158178 CATHETER GUID 6FR L100CM GRN PTFE JR4 TRUELUMEN HYBRID

## (undated) DEVICE — TR BAND RADIAL ARTERY COMPRESSION DEVICE: Brand: TR BAND

## (undated) DEVICE — Device

## (undated) DEVICE — CATH LAB PACK: Brand: MEDLINE INDUSTRIES, INC.

## (undated) DEVICE — SOLUTION IV HEPARIN SODIUM SODIUM CHL 0.9% 500 ML INJ VIAFLX

## (undated) DEVICE — GLIDESHEATH SLENDER STAINLESS STEEL KIT: Brand: GLIDESHEATH SLENDER

## (undated) DEVICE — CATHETER COR DIAG PIGTAILS PIG 145 CRV 5FR 110CM 6 SIDE H

## (undated) DEVICE — GUIDEWIRE VASC L150CM DIA0.025IN TIP L7CM J RAD 3MM PTFE

## (undated) DEVICE — GUIDEWIRE VASC L260CM DIA0.035IN RAD 3MM J TIP L7CM PTFE